# Patient Record
Sex: MALE | Race: WHITE | NOT HISPANIC OR LATINO
[De-identification: names, ages, dates, MRNs, and addresses within clinical notes are randomized per-mention and may not be internally consistent; named-entity substitution may affect disease eponyms.]

---

## 2018-07-04 ENCOUNTER — MEDICATION RENEWAL (OUTPATIENT)
Age: 83
End: 2018-07-04

## 2018-07-04 DIAGNOSIS — I10 ESSENTIAL (PRIMARY) HYPERTENSION: ICD-10-CM

## 2018-09-04 ENCOUNTER — RX RENEWAL (OUTPATIENT)
Age: 83
End: 2018-09-04

## 2018-10-05 ENCOUNTER — MEDICATION RENEWAL (OUTPATIENT)
Age: 83
End: 2018-10-05

## 2018-12-17 ENCOUNTER — RX RENEWAL (OUTPATIENT)
Age: 83
End: 2018-12-17

## 2019-02-04 ENCOUNTER — RX RENEWAL (OUTPATIENT)
Age: 84
End: 2019-02-04

## 2019-04-05 ENCOUNTER — RX RENEWAL (OUTPATIENT)
Age: 84
End: 2019-04-05

## 2019-05-22 ENCOUNTER — INPATIENT (INPATIENT)
Facility: HOSPITAL | Age: 84
LOS: 1 days | Discharge: HOME | End: 2019-05-24
Attending: HOSPITALIST | Admitting: HOSPITALIST
Payer: MEDICARE

## 2019-05-22 VITALS
SYSTOLIC BLOOD PRESSURE: 197 MMHG | OXYGEN SATURATION: 97 % | HEART RATE: 104 BPM | TEMPERATURE: 98 F | DIASTOLIC BLOOD PRESSURE: 103 MMHG | RESPIRATION RATE: 16 BRPM

## 2019-05-22 LAB
ALBUMIN SERPL ELPH-MCNC: 3.9 G/DL — SIGNIFICANT CHANGE UP (ref 3.3–5)
ALP SERPL-CCNC: 55 U/L — SIGNIFICANT CHANGE UP (ref 40–120)
ALT FLD-CCNC: 19 U/L — SIGNIFICANT CHANGE UP (ref 4–41)
ANION GAP SERPL CALC-SCNC: 14 MMO/L — SIGNIFICANT CHANGE UP (ref 7–14)
APTT BLD: 25.5 SEC — LOW (ref 27.5–36.3)
AST SERPL-CCNC: 47 U/L — HIGH (ref 4–40)
BASOPHILS # BLD AUTO: 0.06 K/UL — SIGNIFICANT CHANGE UP (ref 0–0.2)
BASOPHILS NFR BLD AUTO: 0.5 % — SIGNIFICANT CHANGE UP (ref 0–2)
BILIRUB SERPL-MCNC: 0.6 MG/DL — SIGNIFICANT CHANGE UP (ref 0.2–1.2)
BUN SERPL-MCNC: 17 MG/DL — SIGNIFICANT CHANGE UP (ref 7–23)
CALCIUM SERPL-MCNC: 9.4 MG/DL — SIGNIFICANT CHANGE UP (ref 8.4–10.5)
CHLORIDE SERPL-SCNC: 102 MMOL/L — SIGNIFICANT CHANGE UP (ref 98–107)
CO2 SERPL-SCNC: 23 MMOL/L — SIGNIFICANT CHANGE UP (ref 22–31)
CREAT SERPL-MCNC: 1.06 MG/DL — SIGNIFICANT CHANGE UP (ref 0.5–1.3)
EOSINOPHIL # BLD AUTO: 0.02 K/UL — SIGNIFICANT CHANGE UP (ref 0–0.5)
EOSINOPHIL NFR BLD AUTO: 0.2 % — SIGNIFICANT CHANGE UP (ref 0–6)
GLUCOSE SERPL-MCNC: 121 MG/DL — HIGH (ref 70–99)
HCT VFR BLD CALC: 45.9 % — SIGNIFICANT CHANGE UP (ref 39–50)
HGB BLD-MCNC: 14.4 G/DL — SIGNIFICANT CHANGE UP (ref 13–17)
IMM GRANULOCYTES NFR BLD AUTO: 0.8 % — SIGNIFICANT CHANGE UP (ref 0–1.5)
INR BLD: 1.1 — SIGNIFICANT CHANGE UP (ref 0.88–1.17)
LYMPHOCYTES # BLD AUTO: 1.05 K/UL — SIGNIFICANT CHANGE UP (ref 1–3.3)
LYMPHOCYTES # BLD AUTO: 7.9 % — LOW (ref 13–44)
MAGNESIUM SERPL-MCNC: 1.9 MG/DL — SIGNIFICANT CHANGE UP (ref 1.6–2.6)
MCHC RBC-ENTMCNC: 27.6 PG — SIGNIFICANT CHANGE UP (ref 27–34)
MCHC RBC-ENTMCNC: 31.4 % — LOW (ref 32–36)
MCV RBC AUTO: 87.9 FL — SIGNIFICANT CHANGE UP (ref 80–100)
MONOCYTES # BLD AUTO: 1.08 K/UL — HIGH (ref 0–0.9)
MONOCYTES NFR BLD AUTO: 8.2 % — SIGNIFICANT CHANGE UP (ref 2–14)
NEUTROPHILS # BLD AUTO: 10.9 K/UL — HIGH (ref 1.8–7.4)
NEUTROPHILS NFR BLD AUTO: 82.4 % — HIGH (ref 43–77)
NRBC # FLD: 0 K/UL — SIGNIFICANT CHANGE UP (ref 0–0)
PHOSPHATE SERPL-MCNC: 2.8 MG/DL — SIGNIFICANT CHANGE UP (ref 2.5–4.5)
PLATELET # BLD AUTO: 208 K/UL — SIGNIFICANT CHANGE UP (ref 150–400)
PMV BLD: 10.6 FL — SIGNIFICANT CHANGE UP (ref 7–13)
POTASSIUM SERPL-MCNC: 5.9 MMOL/L — HIGH (ref 3.5–5.3)
POTASSIUM SERPL-SCNC: 5.9 MMOL/L — HIGH (ref 3.5–5.3)
PROT SERPL-MCNC: 7.2 G/DL — SIGNIFICANT CHANGE UP (ref 6–8.3)
PROTHROM AB SERPL-ACNC: 12.3 SEC — SIGNIFICANT CHANGE UP (ref 9.8–13.1)
RBC # BLD: 5.22 M/UL — SIGNIFICANT CHANGE UP (ref 4.2–5.8)
RBC # FLD: 13.9 % — SIGNIFICANT CHANGE UP (ref 10.3–14.5)
SODIUM SERPL-SCNC: 139 MMOL/L — SIGNIFICANT CHANGE UP (ref 135–145)
TROPONIN T, HIGH SENSITIVITY: 27 NG/L — SIGNIFICANT CHANGE UP (ref ?–14)
TSH SERPL-MCNC: 12.82 UIU/ML — HIGH (ref 0.27–4.2)
WBC # BLD: 13.21 K/UL — HIGH (ref 3.8–10.5)
WBC # FLD AUTO: 13.21 K/UL — HIGH (ref 3.8–10.5)

## 2019-05-22 RX ORDER — MORPHINE SULFATE 50 MG/1
2 CAPSULE, EXTENDED RELEASE ORAL ONCE
Refills: 0 | Status: DISCONTINUED | OUTPATIENT
Start: 2019-05-22 | End: 2019-05-22

## 2019-05-22 RX ORDER — METOPROLOL TARTRATE 50 MG
5 TABLET ORAL ONCE
Refills: 0 | Status: COMPLETED | OUTPATIENT
Start: 2019-05-22 | End: 2019-05-22

## 2019-05-22 RX ORDER — ADENOSINE 3 MG/ML
6 INJECTION INTRAVENOUS ONCE
Refills: 0 | Status: COMPLETED | OUTPATIENT
Start: 2019-05-22 | End: 2019-05-22

## 2019-05-22 RX ORDER — METOPROLOL TARTRATE 50 MG
25 TABLET ORAL ONCE
Refills: 0 | Status: COMPLETED | OUTPATIENT
Start: 2019-05-22 | End: 2019-05-22

## 2019-05-22 RX ORDER — SODIUM CHLORIDE 9 MG/ML
1000 INJECTION INTRAMUSCULAR; INTRAVENOUS; SUBCUTANEOUS ONCE
Refills: 0 | Status: COMPLETED | OUTPATIENT
Start: 2019-05-22 | End: 2019-05-22

## 2019-05-22 RX ADMIN — SODIUM CHLORIDE 1000 MILLILITER(S): 9 INJECTION INTRAMUSCULAR; INTRAVENOUS; SUBCUTANEOUS at 23:29

## 2019-05-22 RX ADMIN — ADENOSINE 6 MILLIGRAM(S): 3 INJECTION INTRAVENOUS at 23:29

## 2019-05-22 RX ADMIN — MORPHINE SULFATE 2 MILLIGRAM(S): 50 CAPSULE, EXTENDED RELEASE ORAL at 23:50

## 2019-05-22 NOTE — ED ADULT NURSE NOTE - NSIMPLEMENTINTERV_GEN_ALL_ED
Implemented All Fall with Harm Risk Interventions:  [place] to call system. Call bell, personal items and telephone within reach. Instruct patient to call for assistance. Room bathroom lighting operational. Non-slip footwear when patient is off stretcher. Physically safe environment: no spills, clutter or unnecessary equipment. Stretcher in lowest position, wheels locked, appropriate side rails in place. Provide visual cue, wrist band, yellow gown, etc. Monitor gait and stability. Monitor for mental status changes and reorient to person, place, and time. Review medications for side effects contributing to fall risk. Reinforce activity limits and safety measures with patient and family. Provide visual clues: red socks.

## 2019-05-22 NOTE — ED PROVIDER NOTE - CLINICAL SUMMARY MEDICAL DECISION MAKING FREE TEXT BOX
Fall in patient with tachyarrhythmia.  Possibly related to medication noncompliance vs. dehydration vs. electrolyte derangement vs. pain.  Labs, CT head/c-spine/chest, admit to tele.

## 2019-05-22 NOTE — ED PROVIDER NOTE - OBJECTIVE STATEMENT
89 y.o. male PMH HTN p/w fall.  He was working in his garden, and upon coming out of his door he tripped and fell down his outside steps and hit his head.  He is endorsing headache and left anterior rib pain.  NO neck pain, back pain, loss of consciousness.  No blood thinners.  He lives alone.  He used to take atenolol, but stopped taking all of his meds two months ago because he was sick of taking pills. 89 y.o. male PMH HTN p/w fall.  He was working in his garden, and upon coming out of his door he tripped and fell down his outside steps and hit his head.  He is endorsing headache and left anterior rib pain.  NO neck pain, back pain, loss of consciousness.  No blood thinners.  He lives alone.  He used to take atenolol, but stopped taking all of his meds two months ago because he was sick of taking pills.  Attending - Agree with above.  I evaluated patient myself.  89 y/o M with daughter and granddaughter at bedside.  To ED after accidental fall down approx 7 steps.  + head trauma to left parietooccip area per patient.  No LOC.  Denies blurry vision, headache, n/v.  In ED developed SVT which was treated and subsequently afib with RVR.

## 2019-05-22 NOTE — ED PROVIDER NOTE - PROGRESS NOTE DETAILS
Called to bedside by nurse for .  EKG appeared regular, narrow complex, c/w SVT.  Vagal maneuvers attempted unsuccessfully.  Mentating appropriately, BP stable.  Adenosine 6mg pushed with transformation to Aflutter with variable conduction 110s, then to sinus around 100. HR increased again to 150s.  Metoprolol 5mg IV push, HR dropped within 1 minute to 90's.  Metoprolol 25mg oral ordered. Bill YOUNG: Pt signed out to me.  CT head and c-spine with no acute abnormalities.  CT chest showing multiple anterior rib fractures, no flail segment, no underlying ptx/hemothorax.  I spoke with trauma attending at [facility] - as patient is pain controlled and has no other underlying traumatic injuries, no need for transfer for trauma evaluation or admission.  Upon reassessment pt is without any complaints, no chest pain or SOB at this time.  But given episode of tachycardia earlier in this evening, will admit for tele monitoring.  Trop stable x 2.

## 2019-05-22 NOTE — ED ADULT NURSE REASSESSMENT NOTE - NS ED NURSE REASSESS COMMENT FT1
Pt remains stable at this time. Denies any complaints. respirations are equal and unlabored, sating 98% on room air. Sinus tachycardic on monitor. Family at bedside. Awaiting further plan at this time

## 2019-05-22 NOTE — ED ADULT NURSE REASSESSMENT NOTE - NS ED NURSE REASSESS COMMENT FT1
6mg of Adenosine given by MD Hernandez. Pt HR went to 107. Pt remains stable and denies any complaints at this time. Respirations remain equal and unlabored. Sinus tachycardic on monitor. Pt stable at this time. MD Hernandez and MD Luna remain at bedside evaluating pt. Family remains at bedside. Awaiting further plan

## 2019-05-22 NOTE — ED ADULT NURSE REASSESSMENT NOTE - NS ED NURSE REASSESS COMMENT FT1
Pt noted to be slightly tachycardic, placed on monitor. Sinus tachycardic on monitor. Respirations are equal and unlabored, sating 97% on room air. Denies any complaints at this time. Denies chest pain, palpitations, sob, dizziness, headache. Pt remains stable, family at bedside. Awaiting for further plan of care

## 2019-05-22 NOTE — ED PROVIDER NOTE - CARE PLAN
Principal Discharge DX:	Fall, initial encounter  Secondary Diagnosis:	Closed fracture of multiple ribs of left side, initial encounter  Secondary Diagnosis:	Tachycardia

## 2019-05-22 NOTE — ED PROVIDER NOTE - PHYSICAL EXAMINATION
Gen:  NAD, alert and oriented x 4  HEENT:  Head atraumatic, PERRL, MMM  Heart:  Tachycardic, regular rhythm, no M/R/G,m tenderness left inferior anterior ribs  Lung:  CTA b/l, no rales or wheeze  Abd:  ND, soft, NT  Ext:  No sign of trauma  Skin:  NO abrasion or ecchymosis  Neuro:  Nonfocal Gen:  NAD, alert and oriented x 4  HEENT:  Head atraumatic, PERRL, MMM  Heart:  Tachycardic, regular rhythm, no M/R/G,m tenderness left inferior anterior ribs  Lung:  CTA b/l, no rales or wheeze  Abd:  ND, soft, NT  Ext:  No sign of trauma  Skin:  NO abrasion or ecchymosis  Neuro:  Nonfocal, normal gait Gen:  NAD, alert and oriented x 4  HEENT:  Head atraumatic, PERRL, MMM  Heart:  Tachycardic, regular rhythm, no M/R/G,m tenderness left inferior anterior ribs  Lung:  CTA b/l, no rales or wheeze  Abd:  ND, soft, NT  Ext:  No sign of trauma  Skin:  NO abrasion or ecchymosis  Neuro:  Nonfocal, normal gait  ATTENDING PHYSICAL EXAM DR. Luna ***GEN - NAD; well appearing; A+O x3 ***HEAD - No cranial step-off or hematoma appreciated ***EYES/NOSE - PERRL, EOMI, mucous membranes moist, no discharge ***THROAT: Oral cavity and pharynx normal. No inflammation, swelling, exudate, or lesions.  ***NECK: Neck supple, non-tender without lymphadenopathy, no masses, no JVD.   ***PULMONARY - CTA b/l, symmetric breath sounds. ***CARDIAC -s1s2, RRR, no M,R,G  ***ABDOMEN - +BS, ND, NT, soft, no guarding, no rebound, no masses   ***BACK - no CVA tenderness, Normal  spine ***EXTREMITIES - symmetric pulses, 2+ dp, capillary refill < 2 seconds, no clubbing, no cyanosis, no edema ***SKIN - no rash or bruising   ***NEUROLOGIC - alert, CN 2-12 intact, reflexes nl, sensation nl, coordination nl, finger to nose nl, romberg negative, motor 5/5 RUE/LUE/RLE/LLE/EHL/Plantar flexion, no pronator drift

## 2019-05-22 NOTE — ED ADULT NURSE REASSESSMENT NOTE - NS ED NURSE REASSESS COMMENT FT1
Pt noted to go into SVT as per Tele tech. MD Gonzalez and MD Luna called to bedside evaluating pt. Pt denies any complaints. Respirations are equal and unlabored, no distress noted. Sating well on room air. denies chest pain, palpitations, sob, dizziness. Pt remains in SVT. 20 gauge placed in the left hand. Labs sent. EKG in progress. IV fluids infusing. Pt noted to go into SVT as per Tele tech. MD Gonzalez and MD Luna called to bedside evaluating pt. Pt denies any complaints. Respirations are equal and unlabored, no distress noted. Sating well on room air. denies chest pain, palpitations, sob, dizziness. Pt remains in SVT. HR in the 160's. 20 gauge placed in the left hand. Labs sent. EKG in progress. IV fluids infusing.

## 2019-05-22 NOTE — ED ADULT NURSE NOTE - OBJECTIVE STATEMENT
Received pt in room 23. Pt A&Ox3, ambulatory, family at bedside. Pt s/p trip and fall at home. As per pt " I tripped and fell down the stairs, and hit my head". "I have a lump on the back of my head." Pt reports no LOC. Pt now c/o left sided rib pain and pain to the back of the head. Hematoma noted to the top of the head, no laceration or bleeding noted. No deformity, bleeding, bruising, swelling, redness noted to left ride area. No injuries noted to the rest of the body. Skin intact. Pt appears stable and in no apparent distress. hx. HTN. pt noted to be hypertensive, but otherwise vitally stable. Airway patent, pt speaking full sentences. respirations equal, nonlabored, no sign of respiratory distress. sating 97% on room air. +ROM in all extremities. Denies chest pain, palpitations, sob, n/v/d, dizziness, blurry vision, headache, fever, urinary complications. Denies use of blood thinners. Pt stable, safety maintained, family remains at bedside. awaiting further plan at this time

## 2019-05-22 NOTE — ED ADULT TRIAGE NOTE - CHIEF COMPLAINT QUOTE
Pt calm pleasant affect Pt st" I stepped into the house and I think I tripped on a torn rubber door jam and fell down the stairs ....I have a lump on head....did not pass out I got up slowly and got back upstairs....I have pain in left side of rib area  and on back of head." Denied blood thinners, denies feeling short of breath,  resp even & unlabored. Localizing Hematoma to posterior head.  hx htn

## 2019-05-23 DIAGNOSIS — Z29.9 ENCOUNTER FOR PROPHYLACTIC MEASURES, UNSPECIFIED: ICD-10-CM
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
## 2019-05-23 DIAGNOSIS — S22.42XA MULTIPLE FRACTURES OF RIBS, LEFT SIDE, INITIAL ENCOUNTER FOR CLOSED FRACTURE: ICD-10-CM

## 2019-05-23 DIAGNOSIS — I47.1 SUPRAVENTRICULAR TACHYCARDIA: ICD-10-CM

## 2019-05-23 DIAGNOSIS — I10 ESSENTIAL (PRIMARY) HYPERTENSION: ICD-10-CM

## 2019-05-23 DIAGNOSIS — E03.9 HYPOTHYROIDISM, UNSPECIFIED: ICD-10-CM

## 2019-05-23 LAB
ANION GAP SERPL CALC-SCNC: 13 MMO/L — SIGNIFICANT CHANGE UP (ref 7–14)
BLD GP AB SCN SERPL QL: NEGATIVE — SIGNIFICANT CHANGE UP
BUN SERPL-MCNC: 15 MG/DL — SIGNIFICANT CHANGE UP (ref 7–23)
CALCIUM SERPL-MCNC: 9.4 MG/DL — SIGNIFICANT CHANGE UP (ref 8.4–10.5)
CHLORIDE SERPL-SCNC: 103 MMOL/L — SIGNIFICANT CHANGE UP (ref 98–107)
CHOLEST SERPL-MCNC: 159 MG/DL — SIGNIFICANT CHANGE UP (ref 120–199)
CK MB BLD-MCNC: 4.2 — HIGH (ref 0–2.5)
CK MB BLD-MCNC: 7.78 NG/ML — HIGH (ref 1–6.6)
CK SERPL-CCNC: 187 U/L — SIGNIFICANT CHANGE UP (ref 30–200)
CO2 SERPL-SCNC: 28 MMOL/L — SIGNIFICANT CHANGE UP (ref 22–31)
CREAT SERPL-MCNC: 0.92 MG/DL — SIGNIFICANT CHANGE UP (ref 0.5–1.3)
GLUCOSE SERPL-MCNC: 109 MG/DL — HIGH (ref 70–99)
HBA1C BLD-MCNC: 6.2 % — HIGH (ref 4–5.6)
HCT VFR BLD CALC: 42.3 % — SIGNIFICANT CHANGE UP (ref 39–50)
HDLC SERPL-MCNC: 52 MG/DL — SIGNIFICANT CHANGE UP (ref 35–55)
HGB BLD-MCNC: 13.9 G/DL — SIGNIFICANT CHANGE UP (ref 13–17)
LIPID PNL WITH DIRECT LDL SERPL: 108 MG/DL — SIGNIFICANT CHANGE UP
MAGNESIUM SERPL-MCNC: 1.8 MG/DL — SIGNIFICANT CHANGE UP (ref 1.6–2.6)
MCHC RBC-ENTMCNC: 28.5 PG — SIGNIFICANT CHANGE UP (ref 27–34)
MCHC RBC-ENTMCNC: 32.9 % — SIGNIFICANT CHANGE UP (ref 32–36)
MCV RBC AUTO: 86.7 FL — SIGNIFICANT CHANGE UP (ref 80–100)
NRBC # FLD: 0 K/UL — SIGNIFICANT CHANGE UP (ref 0–0)
PLATELET # BLD AUTO: 205 K/UL — SIGNIFICANT CHANGE UP (ref 150–400)
PMV BLD: 10.6 FL — SIGNIFICANT CHANGE UP (ref 7–13)
POTASSIUM SERPL-MCNC: 3.8 MMOL/L — SIGNIFICANT CHANGE UP (ref 3.5–5.3)
POTASSIUM SERPL-SCNC: 3.8 MMOL/L — SIGNIFICANT CHANGE UP (ref 3.5–5.3)
RBC # BLD: 4.88 M/UL — SIGNIFICANT CHANGE UP (ref 4.2–5.8)
RBC # FLD: 14 % — SIGNIFICANT CHANGE UP (ref 10.3–14.5)
RH IG SCN BLD-IMP: POSITIVE — SIGNIFICANT CHANGE UP
SODIUM SERPL-SCNC: 144 MMOL/L — SIGNIFICANT CHANGE UP (ref 135–145)
TRIGL SERPL-MCNC: 70 MG/DL — SIGNIFICANT CHANGE UP (ref 10–149)
TROPONIN T, HIGH SENSITIVITY: 33 NG/L — SIGNIFICANT CHANGE UP (ref ?–14)
TROPONIN T, HIGH SENSITIVITY: 34 NG/L — SIGNIFICANT CHANGE UP (ref ?–14)
TSH SERPL-MCNC: 10.63 UIU/ML — HIGH (ref 0.27–4.2)
WBC # BLD: 8.38 K/UL — SIGNIFICANT CHANGE UP (ref 3.8–10.5)
WBC # FLD AUTO: 8.38 K/UL — SIGNIFICANT CHANGE UP (ref 3.8–10.5)

## 2019-05-23 PROCEDURE — 71250 CT THORAX DX C-: CPT | Mod: 26

## 2019-05-23 PROCEDURE — 99223 1ST HOSP IP/OBS HIGH 75: CPT

## 2019-05-23 PROCEDURE — 72125 CT NECK SPINE W/O DYE: CPT | Mod: 26

## 2019-05-23 PROCEDURE — 70450 CT HEAD/BRAIN W/O DYE: CPT | Mod: 26

## 2019-05-23 RX ORDER — ENOXAPARIN SODIUM 100 MG/ML
40 INJECTION SUBCUTANEOUS EVERY 24 HOURS
Refills: 0 | Status: DISCONTINUED | OUTPATIENT
Start: 2019-05-23 | End: 2019-05-24

## 2019-05-23 RX ORDER — IBUPROFEN 200 MG
600 TABLET ORAL EVERY 6 HOURS
Refills: 0 | Status: DISCONTINUED | OUTPATIENT
Start: 2019-05-23 | End: 2019-05-24

## 2019-05-23 RX ORDER — LEVOTHYROXINE SODIUM 125 MCG
75 TABLET ORAL DAILY
Refills: 0 | Status: DISCONTINUED | OUTPATIENT
Start: 2019-05-23 | End: 2019-05-24

## 2019-05-23 RX ORDER — METOPROLOL TARTRATE 50 MG
25 TABLET ORAL
Refills: 0 | Status: DISCONTINUED | OUTPATIENT
Start: 2019-05-23 | End: 2019-05-24

## 2019-05-23 RX ORDER — PANTOPRAZOLE SODIUM 20 MG/1
40 TABLET, DELAYED RELEASE ORAL
Refills: 0 | Status: DISCONTINUED | OUTPATIENT
Start: 2019-05-23 | End: 2019-05-24

## 2019-05-23 RX ADMIN — Medication 25 MILLIGRAM(S): at 00:01

## 2019-05-23 RX ADMIN — Medication 75 MICROGRAM(S): at 12:28

## 2019-05-23 RX ADMIN — Medication 25 MILLIGRAM(S): at 17:43

## 2019-05-23 RX ADMIN — ENOXAPARIN SODIUM 40 MILLIGRAM(S): 100 INJECTION SUBCUTANEOUS at 09:38

## 2019-05-23 RX ADMIN — PANTOPRAZOLE SODIUM 40 MILLIGRAM(S): 20 TABLET, DELAYED RELEASE ORAL at 09:38

## 2019-05-23 RX ADMIN — Medication 25 MILLIGRAM(S): at 09:07

## 2019-05-23 RX ADMIN — Medication 5 MILLIGRAM(S): at 00:01

## 2019-05-23 NOTE — H&P ADULT - HISTORY OF PRESENT ILLNESS
89 male pmh of HTN, Hypothyroid, skin ca (unknown what type) s/p chemo (10-15 years ago) presents after a fall. Patient states that on 5/22/19 around dinner time he was going to go down his stairs  when he tripped and fell down the stairs, hitting his left side and his head. Prior to fall denies any complaints, he was in usual state of health and feeling well. When he hit the ground he started to have left side chest pain, 9/10, felt like he was punched in the chest, and made it difficult to take a deep breath and reproduced by lifting his left arm.  He was on the ground for a couple of minutes, when he got himself up he had a glass of wine to try to decrease the pain. He then came to the ED to be evaluted. While in the ER he was noted to have a heart rate of 160, he received adenosine and metoprolol in hte IV. He has had a fast heart rate in the past but only when nervous, was never told he has AFib or Aflutter. Of note he stopped taking all of his medications in the last 6 months becuase he did not want to feel like a "pill popper", and he felt fine without them. He denies any LOC, dizziness, abd pain, sob, diarrhea, brbpr, melena, fever, chills, nausea, vomiting, dysuria, hematuria, visual change, palpitations, seizure activity, urinary incontinence.

## 2019-05-23 NOTE — H&P ADULT - NEGATIVE NEUROLOGICAL SYMPTOMS
no headache/no difficulty walking/no weakness/no confusion/no generalized seizures/no focal seizures/no loss of consciousness/no paresthesias/no syncope/no vertigo/no transient paralysis

## 2019-05-23 NOTE — H&P ADULT - NSICDXPASTMEDICALHX_GEN_ALL_CORE_FT
PAST MEDICAL HISTORY:  HTN (hypertension)     Hypothyroidism     Skin cancer unknown type s/p chemo 10-15 years ago, s/p resection    Urinary retention

## 2019-05-23 NOTE — H&P ADULT - ATTENDING COMMENTS
Patient was seen and examined personally by me. I have discussed the plan and reviewed the PA's note and agree with the above physical exam findings including assessment and plan except as indicated below. Lab and imagining reviewed.     89M with HTN hypothyroid non-compliant with home meds, poor historian, presented after unwitnessed fall down multiple steps of stairs with left sided chest pain. found to have multiple left rib fx. respiratory stable, no paroxysmal chest wall motions on exam. NSAID for pain control, start PPI as patient have h/o gastritis and ulcer. incenstive spirometer. PT eval for dispo.   Also in SVT on presentation s/p adenosine and BB with control of HR. Possible Afib/aflutter per ED after rate control, but strip reviewed and more like frequent APC. trops with neg delta. CK can be from trauma. need to clarify old home meds and resume home BB and synthroid.  TTE pending.

## 2019-05-23 NOTE — H&P ADULT - PROBLEM SELECTOR PLAN 1
On presentation to hospital noted to have heart rate in 160s  - received Adenosine 0.6mg x1 as well as metoprolol 5mg IV x1 - Per ED report broke from SVT to Aflutter with variable block and then into Sinus rhythm.   Will request EP eval to review strips  as might be Sinus tach with APC's and not Aflutter/Afib.   Will get echocardiogram  Lopressor 25mg BID ordered

## 2019-05-23 NOTE — H&P ADULT - PROBLEM SELECTOR PLAN 3
Left anterolateral closed fx of ribs 6-8 c possible left anterolateral non-displaced fractures ribs 3-5  NO evidence of PTX on CT Chest  Currently patient is pain free but does have difficulty taking a deep breath  Pain management.

## 2019-05-23 NOTE — H&P ADULT - ASSESSMENT
89 male pmh of HTN, Hypothyroid, skin ca (unknown what type) s/p chemo (10-15 years ago) presents after a mechanical fall s/b left rib fracture ribs 3-8 noted to be have SVT to 160 aflutter vs Sinus tach admitted for further evaluation.

## 2019-05-23 NOTE — H&P ADULT - PROBLEM SELECTOR PLAN 6
IMPROVE VTE Individual Risk Assessment  RISK                                                                Points  [  ] Previous VTE                                                  3  [  ] Thrombophilia                                               2  [  ] Lower limb paralysis                                      2  [  ] Current Cancer                                              2   { x ] Immobilization > 24 hrs                                1    [  ] ICU/CCU stay > 24 hours                              1  [ x ] Age > 60                                                      1    IMPROVE VTE Score ___2______

## 2019-05-23 NOTE — ED ADULT NURSE REASSESSMENT NOTE - NS ED NURSE REASSESS COMMENT FT1
Pt remains stable and comfortable. denies any complaints. sinus rhythm on monitor. respirations are equal and unlabored. Vitals stable. Call bell within reach.

## 2019-05-23 NOTE — CONSULT NOTE ADULT - ASSESSMENT
Patient is a 89y old male with past medical history of HTN, hypothyroidism, skin cancer s/p chemo 15 years ago who was cutting the grass, became mildly short of breath, walked into the house to rest and fell onto the floor. The fall was unwitnessed. 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  He has remained in NSR . He had stopped taking all medications including Atenolol x 2 weeks. No prior syncope history.  After discussing the treatment options and taking into account the traumatic rib fractures that he sustained, we recommend continuing the beta blocker and  inserting a loop recorder to monitor for additional arrhythmic events. The risks, benefits and alternatives tot he procedure were discussed and they have agreed and he is scheduled for tomorrow.

## 2019-05-23 NOTE — H&P ADULT - NSHPSOCIALHISTORY_GEN_ALL_CORE
Lives alone, , retired  worker  previous smoker, 6 pack year history, quit 20 years ago  minimal etoh use, no illicit drug use

## 2019-05-23 NOTE — CONSULT NOTE ADULT - SUBJECTIVE AND OBJECTIVE BOX
Patient is a 89y old male with past medical history of HTN, hypothyroidism, skin cancer s/p chemo 15 years ago who was cutting the grass, became mildly short of breath, walked into the house to rest and fell onto the floor.  He did not lose consciousness but doesn't know what made him fall.  Denies associated dizziness, palpitations, chest pain or lightheadedness. When he fell to the ground he hit his head and the left side of his body but was able to get himself up after a few minutes and pour himself a glass of wine to ease the pain. He came to the ED to be evaluated and was found to be in  bpm.  He received Adenosine 6mg x1 and metoprolol 5mg IV x1 and converted to normal sinus rhythm.  He has remained in NSR.  Of note, patient had stopped taking all medications 2 weeks ago, including Atenolol.  He is now on metoprolol 25 mg bid with no further episodes of SVT. His only complaint is left sided pain 2/2 the rib fx's sustained from the fall.   No prior cardiac history.        PAST MEDICAL & SURGICAL HISTORY:  Skin cancer: unknown type s/p chemo 10-15 years ago, s/p resection  Urinary retention  HTN (hypertension)  Hypothyroidism  No significant past surgical history        MEDICATIONS  (STANDING):  enoxaparin Injectable 40 milliGRAM(s) SubCutaneous every 24 hours  levothyroxine 75 MICROGram(s) Oral daily  metoprolol tartrate 25 milliGRAM(s) Oral two times a day  pantoprazole    Tablet 40 milliGRAM(s) Oral before breakfast    MEDICATIONS  (PRN):  ibuprofen  Tablet. 600 milliGRAM(s) Oral every 6 hours PRN Mild Pain (1 - 3), Moderate Pain (4 - 6), Severe Pain (7 - 10)      FAMILY HISTORY:      SOCIAL HISTORY:   Lives alone    CIGARETTES:  never  Drugs:  no  ALCOHOL:  occasional wine    REVIEW OF SYSTEMS:    CONSTITUTIONAL: No fever, weight loss, chills, shakes, or fatigue  EYES: No eye pain, visual disturbances, or discharge  ENMT:  Mild hearing loss  No tinnitus  No sinus or throat pain  Long standing hx of vertigo  NECK: No pain or stiffness  BREASTS: No pain, masses, or nipple discharge  RESPIRATORY: No cough, wheezing, hemoptysis, or shortness of breath  CARDIOVASCULAR: No chest pain, dyspnea, palpitations, dizziness, syncope, paroxysmal nocturnal dyspnea, orthopnea, or arm or leg swelling  GASTROINTESTINAL: No abdominal  or epigastric pain, nausea, vomiting, hematemesis, diarrhea, constipation, melena or bright red blood.  GENITOURINARY: No dysuria, nocturia, hematuria, or urinary incontinence  NEUROLOGICAL: No headaches, memory loss, slurred speech, limb weakness, loss of strength, numbness, or tremors  SKIN: No itching, burning, rashes, or lesions     LYMPH NODES: No enlarged glands  ENDOCRINE: No heat or cold intolerance, or hair loss  MUSCULOSKELETAL: No joint pain or swelling, muscle, back, or extremity pain  PSYCHIATRIC: No depression, anxiety, or difficulty sleeping  HEME/LYMPH: No easy bruising or bleeding gums  ALLERGY AND IMMUNOLOGIC: No hives or rash.      Vital Signs Last 24 Hrs  T(C): 36.9 (23 May 2019 20:33), Max: 37.3 (22 May 2019 23:29)  T(F): 98.4 (23 May 2019 20:33), Max: 99.1 (22 May 2019 23:29)  HR: 64 (23 May 2019 20:33) (64 - 160)  BP: 150/87 (23 May 2019 20:33) (149/75 - 210/125)  BP(mean): --  RR: 17 (23 May 2019 20:33) (15 - 30)  SpO2: 97% (23 May 2019 20:33) (95% - 100%)    PHYSICAL EXAM:    GENERAL: In no apparent distress, well nourished, and hydrated.   HEAD:  Atraumatic, Normocephalic  Small lump on the back of his head  EYES: EOMI, PERRLA, conjunctiva and sclera clear  ENMT: No tonsillar erythema, exudates, or enlargement; Moist mucous membranes,   NECK: Supple and normal thyroid.  No JVD or carotid bruit.  Carotid pulse is 2+ bilaterally.  HEART: Regular rate and rhythm; No murmurs, rubs, or gallops.  PULMONARY: Clear to auscultation and perfusion.  No rales, wheezing, or rhonchi bilaterally.  ABDOMEN: Soft, Nontender, Nondistended; Bowel sounds present  EXTREMITIES:  2+ Peripheral Pulses, No clubbing, cyanosis, or edema.  Very dry skin both lower legs.  LYMPH: No lymphadenopathy noted  NEUROLOGICAL: Grossly nonfocal          INTERPRETATION OF TELEMETRY:  NSR 74 bpm.  NSVT x 3 beats    ECG:  Short R-P tachycardia 160 bpm    I&O's Detail      LABS:                        13.9   8.38  )-----------( 205      ( 23 May 2019 07:45 )             42.3     05-23    144  |  103  |  15  ----------------------------<  109<H>  3.8   |  28  |  0.92    Ca    9.4      23 May 2019 07:45  Phos  2.8     05-22  Mg     1.8     05-23    TPro  7.2  /  Alb  3.9  /  TBili  0.6  /  DBili  x   /  AST  47<H>  /  ALT  19  /  AlkPhos  55  05-22    CARDIAC MARKERS ( 23 May 2019 07:45 )  x     / x     / 187 u/L / 7.78 ng/mL / x          PT/INR - ( 22 May 2019 23:02 )   PT: 12.3 SEC;   INR: 1.10          PTT - ( 22 May 2019 23:02 )  PTT:25.5 SEC    BNP  I&O's Detail    Daily Height in cm: 162.56 (23 May 2019 07:51)    Daily     RADIOLOGY & ADDITIONAL STUDIES:  PREVIOUS DIAGNOSTIC TESTING:      ECHO  FINDINGS:  pending

## 2019-05-23 NOTE — H&P ADULT - RS GEN PE MLT RESP DETAILS PC
no wheezes/no rales/respirations non-labored/breath sounds equal/good air movement/airway patent/no rhonchi

## 2019-05-23 NOTE — ED ADULT NURSE REASSESSMENT NOTE - NS ED NURSE REASSESS COMMENT FT1
Pt sleeping, pt appears comfortable and stable at this time. Denies any complaints. Sinus rhythm on monitor. Respirations equal nonlabored, no sign of respiratory distress. sating well. Pt stable, awaiting further plan at this time

## 2019-05-23 NOTE — H&P ADULT - NSHPLABSRESULTS_GEN_ALL_CORE
EKG #1 SVT  @ 160, LAD and IRBBB  EKG #2 Afib/Flutter vs Sinus Tach c APC's @ 110  EKG #3 Sinus tach @ 109 c LAD and IRBBB  Trop 27 > 33  CT Chest acute fx of left anterolateral ribs 6-8, with possible non-displaced facture 3-5  CT Head: no acute hemorrhage or stroke  CT C-Spine No acute fx  WBC 13.21  K5.9  Hemolyzed

## 2019-05-24 ENCOUNTER — TRANSCRIPTION ENCOUNTER (OUTPATIENT)
Age: 84
End: 2019-05-24

## 2019-05-24 VITALS
SYSTOLIC BLOOD PRESSURE: 168 MMHG | TEMPERATURE: 98 F | RESPIRATION RATE: 19 BRPM | DIASTOLIC BLOOD PRESSURE: 92 MMHG | OXYGEN SATURATION: 96 % | HEART RATE: 79 BPM

## 2019-05-24 PROBLEM — C44.90 UNSPECIFIED MALIGNANT NEOPLASM OF SKIN, UNSPECIFIED: Chronic | Status: ACTIVE | Noted: 2019-05-23

## 2019-05-24 LAB
ANION GAP SERPL CALC-SCNC: 12 MMO/L — SIGNIFICANT CHANGE UP (ref 7–14)
BUN SERPL-MCNC: 18 MG/DL — SIGNIFICANT CHANGE UP (ref 7–23)
CALCIUM SERPL-MCNC: 9.4 MG/DL — SIGNIFICANT CHANGE UP (ref 8.4–10.5)
CHLORIDE SERPL-SCNC: 103 MMOL/L — SIGNIFICANT CHANGE UP (ref 98–107)
CO2 SERPL-SCNC: 26 MMOL/L — SIGNIFICANT CHANGE UP (ref 22–31)
CREAT SERPL-MCNC: 0.97 MG/DL — SIGNIFICANT CHANGE UP (ref 0.5–1.3)
GLUCOSE SERPL-MCNC: 94 MG/DL — SIGNIFICANT CHANGE UP (ref 70–99)
HCT VFR BLD CALC: 43.6 % — SIGNIFICANT CHANGE UP (ref 39–50)
HGB BLD-MCNC: 13.7 G/DL — SIGNIFICANT CHANGE UP (ref 13–17)
MAGNESIUM SERPL-MCNC: 2 MG/DL — SIGNIFICANT CHANGE UP (ref 1.6–2.6)
MCHC RBC-ENTMCNC: 27.8 PG — SIGNIFICANT CHANGE UP (ref 27–34)
MCHC RBC-ENTMCNC: 31.4 % — LOW (ref 32–36)
MCV RBC AUTO: 88.4 FL — SIGNIFICANT CHANGE UP (ref 80–100)
NRBC # FLD: 0 K/UL — SIGNIFICANT CHANGE UP (ref 0–0)
PLATELET # BLD AUTO: 198 K/UL — SIGNIFICANT CHANGE UP (ref 150–400)
PMV BLD: 11 FL — SIGNIFICANT CHANGE UP (ref 7–13)
POTASSIUM SERPL-MCNC: 3.7 MMOL/L — SIGNIFICANT CHANGE UP (ref 3.5–5.3)
POTASSIUM SERPL-SCNC: 3.7 MMOL/L — SIGNIFICANT CHANGE UP (ref 3.5–5.3)
RBC # BLD: 4.93 M/UL — SIGNIFICANT CHANGE UP (ref 4.2–5.8)
RBC # FLD: 14 % — SIGNIFICANT CHANGE UP (ref 10.3–14.5)
SODIUM SERPL-SCNC: 141 MMOL/L — SIGNIFICANT CHANGE UP (ref 135–145)
T4 FREE SERPL-MCNC: 0.91 NG/DL — SIGNIFICANT CHANGE UP (ref 0.9–1.8)
WBC # BLD: 9.36 K/UL — SIGNIFICANT CHANGE UP (ref 3.8–10.5)
WBC # FLD AUTO: 9.36 K/UL — SIGNIFICANT CHANGE UP (ref 3.8–10.5)

## 2019-05-24 PROCEDURE — 99232 SBSQ HOSP IP/OBS MODERATE 35: CPT

## 2019-05-24 PROCEDURE — 33285 INSJ SUBQ CAR RHYTHM MNTR: CPT

## 2019-05-24 PROCEDURE — 99239 HOSP IP/OBS DSCHRG MGMT >30: CPT

## 2019-05-24 PROCEDURE — 93306 TTE W/DOPPLER COMPLETE: CPT | Mod: 26

## 2019-05-24 RX ORDER — IBUPROFEN 200 MG
1 TABLET ORAL
Qty: 0 | Refills: 0 | DISCHARGE
Start: 2019-05-24

## 2019-05-24 RX ORDER — METOPROLOL TARTRATE 50 MG
1 TABLET ORAL
Qty: 60 | Refills: 0
Start: 2019-05-24 | End: 2019-06-22

## 2019-05-24 RX ORDER — PANTOPRAZOLE SODIUM 20 MG/1
1 TABLET, DELAYED RELEASE ORAL
Qty: 30 | Refills: 0
Start: 2019-05-24 | End: 2019-06-22

## 2019-05-24 RX ORDER — ATENOLOL 25 MG/1
1 TABLET ORAL
Qty: 0 | Refills: 0 | DISCHARGE

## 2019-05-24 RX ADMIN — Medication 75 MICROGRAM(S): at 05:22

## 2019-05-24 RX ADMIN — Medication 25 MILLIGRAM(S): at 05:22

## 2019-05-24 RX ADMIN — PANTOPRAZOLE SODIUM 40 MILLIGRAM(S): 20 TABLET, DELAYED RELEASE ORAL at 05:22

## 2019-05-24 NOTE — PHYSICAL THERAPY INITIAL EVALUATION ADULT - DISCHARGE DISPOSITION, PT EVAL
anticipated discharge to home with no skilled restorative physical therapy services upon discharge from [facility]. Please follow therapy for continued assessment.

## 2019-05-24 NOTE — PROGRESS NOTE ADULT - PROBLEM SELECTOR PLAN 3
Left anterolateral closed fx of ribs 6-8 c possible left anterolateral non-displaced fractures ribs 3-5  NO evidence of PTX on CT Chest  Currently patient is pain free but does have difficulty taking a deep breath  Tylenol prn

## 2019-05-24 NOTE — CHART NOTE - NSCHARTNOTEFT_GEN_A_CORE
ELECTROPHYSIOLOGY      Patient seen for ILR to be done this morning.  Procedure reviewed and consent was signed.   Labs reviewed.    Will need echo prior to discharge.

## 2019-05-24 NOTE — DISCHARGE NOTE PROVIDER - HOSPITAL COURSE
89 male pmh of HTN, Hypothyroid, skin ca (unknown what type) s/p chemo (10-15 years ago) presents after a fall. Patient states that on 5/22/19 around dinner time he was going to go down his stairs  when he tripped and fell down the stairs, hitting his left side and his head. Prior to fall denies any complaints, he was in usual state of health and feeling well. When he hit the ground he started to have left side chest pain, 9/10, felt like he was punched in the chest, and made it difficult to take a deep breath and reproduced by lifting his left arm.  He was on the ground for a couple of minutes, when he got himself up he had a glass of wine to try to decrease the pain. He then came to the ED to be evaluted. While in the ER he was noted to have a heart rate of 160, he received adenosine and metoprolol in hte IV. He has had a fast heart rate in the past but only when nervous, was never told he has AFib or Aflutter. Of note he stopped taking all of his medications in the last 6 months becuase he did not want to feel like a "pill popper", and he felt fine without them. He denies any LOC, dizziness, abd pain, sob, diarrhea, brbpr, melena, fever, chills, nausea, vomiting, dysuria, hematuria, visual change, palpitations, seizure activity, urinary incontinence.         Hospitalist attending: SVT (supraventricular tachycardia).  Plan: On presentation to hospital noted to have heart rate in 160s s/p Adenosine and metoprolol x1 - Per ED report broke from SVT to Aflutter with variable block and then into Sinus rhythm.     ILR placed by EP 5/24, TTE unremarkable. Lopressor 25mg BID. No further inpatient workup.         Case reviewed with attending who cleared for discharge. Plan discussed with patient and family who consented and agreed with plan of care.

## 2019-05-24 NOTE — PHYSICAL THERAPY INITIAL EVALUATION ADULT - PERTINENT HX OF CURRENT PROBLEM, REHAB EVAL
Pt. is a 89 year old male admitted to [facility] due to fall, left rib fracture ribs 3-8 noted to be have SVT. PMH: skin cancer, Urinary retention.

## 2019-05-24 NOTE — DISCHARGE NOTE PROVIDER - NSDCFUADDAPPT_GEN_ALL_CORE_FT
Follow up at [facility] [id number] [address] [place] [id number] Phone: [phone] on June 10th @3pm. Follow up at [facility] [id number] [address] [place] [id number] Phone: [phone] on June 10th @3pm 4th floor Oncology Building

## 2019-05-24 NOTE — DISCHARGE NOTE NURSING/CASE MANAGEMENT/SOCIAL WORK - NSDCDPATPORTLINK_GEN_ALL_CORE
You can access the [facility] Patient [facility], offered by [facility], by registering with the following website: [email or web address]

## 2019-05-24 NOTE — PROGRESS NOTE ADULT - SUBJECTIVE AND OBJECTIVE BOX
Patient is a 89y old  Male who presents with a chief complaint of Fall (23 May 2019 21:30)      SUBJECTIVE / OVERNIGHT EVENTS: No acute events overnight. Some mild tenderness over ILR placement site and left sided ribs. No SOB, N/V, dizziness.    MEDICATIONS  (STANDING):  levothyroxine 75 MICROGram(s) Oral daily  metoprolol tartrate 25 milliGRAM(s) Oral two times a day  pantoprazole    Tablet 40 milliGRAM(s) Oral before breakfast    MEDICATIONS  (PRN):  ibuprofen  Tablet. 600 milliGRAM(s) Oral every 6 hours PRN Mild Pain (1 - 3), Moderate Pain (4 - 6), Severe Pain (7 - 10)      T(C): 36.3 (05-24-19 @ 04:29), Max: 37.1 (05-24-19 @ 00:30)  HR: 69 (05-24-19 @ 04:29) (64 - 79)  BP: 143/83 (05-24-19 @ 04:29) (140/86 - 170/69)  RR: 17 (05-24-19 @ 04:29) (15 - 18)  SpO2: 97% (05-24-19 @ 04:29) (95% - 100%)  CAPILLARY BLOOD GLUCOSE  I&O's Summary    PHYSICAL EXAM:  GENERAL: no apparent distress, on room air  EYES: sclera clear b/l  CHEST/LUNG: Clear to auscultation bilaterally; No wheezing or crackles, ILR midchest with C/D/I dressing  HEART: Regular rate and rhythm; No murmurs appreciated  ABDOMEN: Soft, Nontender, Nondistended  EXTREMITIES:  WWP, trace leg edema  NEUROLOGY: awake, alert, responds to Qs appropriately, no gross deficits    LABS:                        13.7   9.36  )-----------( 198      ( 24 May 2019 05:35 )             43.6     05-24    141  |  103  |  18  ----------------------------<  94  3.7   |  26  |  0.97    Ca    9.4      24 May 2019 05:35  Phos  2.8     05-22  Mg     2.0     05-24    TPro  7.2  /  Alb  3.9  /  TBili  0.6  /  DBili  x   /  AST  47<H>  /  ALT  19  /  AlkPhos  55  05-22    PT/INR - ( 22 May 2019 23:02 )   PT: 12.3 SEC;   INR: 1.10          PTT - ( 22 May 2019 23:02 )  PTT:25.5 SEC  CARDIAC MARKERS ( 23 May 2019 07:45 )  x     / x     / 187 u/L / 7.78 ng/mL / x              RADIOLOGY & ADDITIONAL TESTS:

## 2019-05-24 NOTE — PROGRESS NOTE ADULT - PROBLEM SELECTOR PLAN 1
On presentation to hospital noted to have heart rate in 160s s/p Adenosine and metoprolol x1 - Per ED report broke from SVT to Aflutter with variable block and then into Sinus rhythm.   ILR placed by EP 5/24, TTE performed, report pending  Lopressor 25mg BID

## 2019-05-24 NOTE — PHYSICAL THERAPY INITIAL EVALUATION ADULT - ASR EQUIP NEEDS DISCH PT EVAL
rolling walker (5 inch wheels)/Pt. educated on and demonstrated how to safely utilize rolling walker with proper sequencing, body mechanics, safety awareness and fall prevention strategies/techniques.

## 2019-05-24 NOTE — CHART NOTE - NSCHARTNOTEFT_GEN_A_CORE
ELECTROPHYSIOLOGY      Patient s/p loop recorder implantation. Tolerated the procedure well.  No complications.  Device site dressing dry and intact.  Patient instructed to remove the dressing in 24 hours.   Post procedure teaching done with patient and his daughters.  Written instructions and contact information provided.  Given home monitor and instructed how to use it.   All questions answered.   He has a follow-up appointment in the device clinic on Sara 10, 2019 at 3:00pm  [address] [place] [address] [phone].  No objection to discharge.

## 2019-05-24 NOTE — DISCHARGE NOTE PROVIDER - NSDCCPCAREPLAN_GEN_ALL_CORE_FT
PRINCIPAL DISCHARGE DIAGNOSIS  Diagnosis: Fall, initial encounter  Assessment and Plan of Treatment: Admitted to the hospital following a fall. You are advised to take pain medication as needed and follow up with your primary care provider in 1-2 weeks after discharge.      SECONDARY DISCHARGE DIAGNOSES  Diagnosis: SVT (supraventricular tachycardia)  Assessment and Plan of Treatment: You were found to have an abnormally fast cardiac rhthym. A loop recorder was placed to better evaluate your irregular rhythm. You are advised to follow up outpatient with electrophysiology on June 10th @3pm.    Diagnosis: Closed fracture of multiple ribs of left side, initial encounter  Assessment and Plan of Treatment: If any signs if difficulty breathing or worsening distress you are advised to go the ED.

## 2019-05-24 NOTE — DISCHARGE NOTE PROVIDER - CARE PROVIDER_API CALL
Shea Mendoza)  Cardiac Electrophysiology; Cardiology; Internal Medicine  [address] [address] [place] [address]  Phone: [phone]  Fax: [phone]  Follow Up Time: 2 weeks

## 2019-06-10 ENCOUNTER — APPOINTMENT (OUTPATIENT)
Dept: ELECTROPHYSIOLOGY | Facility: CLINIC | Age: 84
End: 2019-06-10
Payer: MEDICARE

## 2019-06-10 VITALS — SYSTOLIC BLOOD PRESSURE: 167 MMHG | HEART RATE: 70 BPM | DIASTOLIC BLOOD PRESSURE: 83 MMHG | RESPIRATION RATE: 14 BRPM

## 2019-06-10 DIAGNOSIS — R55 SYNCOPE AND COLLAPSE: ICD-10-CM

## 2019-06-10 DIAGNOSIS — I47.1 SUPRAVENTRICULAR TACHYCARDIA: ICD-10-CM

## 2019-06-10 PROCEDURE — 93285 PRGRMG DEV EVAL SCRMS IP: CPT

## 2019-06-10 RX ORDER — TRIAMTERENE AND HYDROCHLOROTHIAZIDE 37.5; 25 MG/1; MG/1
37.5-25 CAPSULE ORAL
Qty: 30 | Refills: 5 | Status: DISCONTINUED | COMMUNITY
Start: 2018-09-04 | End: 2019-06-10

## 2019-06-10 RX ORDER — ATENOLOL 50 MG/1
50 TABLET ORAL DAILY
Qty: 90 | Refills: 3 | Status: DISCONTINUED | COMMUNITY
Start: 2018-07-04 | End: 2019-06-10

## 2019-06-10 RX ORDER — ESCITALOPRAM OXALATE 10 MG/1
10 TABLET ORAL
Qty: 30 | Refills: 1 | Status: DISCONTINUED | COMMUNITY
Start: 2019-04-05 | End: 2019-06-10

## 2019-07-15 ENCOUNTER — APPOINTMENT (OUTPATIENT)
Dept: ELECTROPHYSIOLOGY | Facility: CLINIC | Age: 84
End: 2019-07-15

## 2019-08-03 ENCOUNTER — EMERGENCY (EMERGENCY)
Facility: HOSPITAL | Age: 84
LOS: 1 days | Discharge: HOME | End: 2019-08-03
Attending: EMERGENCY MEDICINE | Admitting: EMERGENCY MEDICINE
Payer: MEDICARE

## 2019-08-03 VITALS
RESPIRATION RATE: 18 BRPM | SYSTOLIC BLOOD PRESSURE: 164 MMHG | TEMPERATURE: 98 F | HEART RATE: 85 BPM | OXYGEN SATURATION: 94 % | DIASTOLIC BLOOD PRESSURE: 79 MMHG

## 2019-08-03 VITALS
HEIGHT: 63 IN | RESPIRATION RATE: 16 BRPM | WEIGHT: 141.98 LBS | SYSTOLIC BLOOD PRESSURE: 187 MMHG | TEMPERATURE: 98 F | HEART RATE: 111 BPM | DIASTOLIC BLOOD PRESSURE: 100 MMHG | OXYGEN SATURATION: 93 %

## 2019-08-03 LAB
ALBUMIN SERPL ELPH-MCNC: 3.7 G/DL — SIGNIFICANT CHANGE UP (ref 3.3–5)
ALP SERPL-CCNC: 88 U/L — SIGNIFICANT CHANGE UP (ref 40–120)
ALT FLD-CCNC: 24 U/L — SIGNIFICANT CHANGE UP (ref 12–78)
ANION GAP SERPL CALC-SCNC: 4 MMOL/L — LOW (ref 5–17)
AST SERPL-CCNC: 17 U/L — SIGNIFICANT CHANGE UP (ref 15–37)
BASOPHILS # BLD AUTO: 0.06 K/UL — SIGNIFICANT CHANGE UP (ref 0–0.2)
BASOPHILS NFR BLD AUTO: 0.7 % — SIGNIFICANT CHANGE UP (ref 0–2)
BILIRUB SERPL-MCNC: 0.8 MG/DL — SIGNIFICANT CHANGE UP (ref 0.2–1.2)
BUN SERPL-MCNC: 20 MG/DL — SIGNIFICANT CHANGE UP (ref 7–23)
CALCIUM SERPL-MCNC: 9.4 MG/DL — SIGNIFICANT CHANGE UP (ref 8.5–10.1)
CHLORIDE SERPL-SCNC: 105 MMOL/L — SIGNIFICANT CHANGE UP (ref 96–108)
CO2 SERPL-SCNC: 32 MMOL/L — HIGH (ref 22–31)
CREAT SERPL-MCNC: 1.1 MG/DL — SIGNIFICANT CHANGE UP (ref 0.5–1.3)
EOSINOPHIL # BLD AUTO: 0.14 K/UL — SIGNIFICANT CHANGE UP (ref 0–0.5)
EOSINOPHIL NFR BLD AUTO: 1.7 % — SIGNIFICANT CHANGE UP (ref 0–6)
GLUCOSE SERPL-MCNC: 122 MG/DL — HIGH (ref 70–99)
HCT VFR BLD CALC: 41.8 % — SIGNIFICANT CHANGE UP (ref 39–50)
HGB BLD-MCNC: 13.2 G/DL — SIGNIFICANT CHANGE UP (ref 13–17)
IMM GRANULOCYTES NFR BLD AUTO: 0.4 % — SIGNIFICANT CHANGE UP (ref 0–1.5)
LYMPHOCYTES # BLD AUTO: 1.11 K/UL — SIGNIFICANT CHANGE UP (ref 1–3.3)
LYMPHOCYTES # BLD AUTO: 13.6 % — SIGNIFICANT CHANGE UP (ref 13–44)
MCHC RBC-ENTMCNC: 28 PG — SIGNIFICANT CHANGE UP (ref 27–34)
MCHC RBC-ENTMCNC: 31.6 GM/DL — LOW (ref 32–36)
MCV RBC AUTO: 88.7 FL — SIGNIFICANT CHANGE UP (ref 80–100)
MONOCYTES # BLD AUTO: 1.16 K/UL — HIGH (ref 0–0.9)
MONOCYTES NFR BLD AUTO: 14.2 % — HIGH (ref 2–14)
NEUTROPHILS # BLD AUTO: 5.67 K/UL — SIGNIFICANT CHANGE UP (ref 1.8–7.4)
NEUTROPHILS NFR BLD AUTO: 69.4 % — SIGNIFICANT CHANGE UP (ref 43–77)
NRBC # BLD: 0 /100 WBCS — SIGNIFICANT CHANGE UP (ref 0–0)
PLATELET # BLD AUTO: 174 K/UL — SIGNIFICANT CHANGE UP (ref 150–400)
POTASSIUM SERPL-MCNC: 4.6 MMOL/L — SIGNIFICANT CHANGE UP (ref 3.5–5.3)
POTASSIUM SERPL-SCNC: 4.6 MMOL/L — SIGNIFICANT CHANGE UP (ref 3.5–5.3)
PROT SERPL-MCNC: 7.3 G/DL — SIGNIFICANT CHANGE UP (ref 6–8.3)
RBC # BLD: 4.71 M/UL — SIGNIFICANT CHANGE UP (ref 4.2–5.8)
RBC # FLD: 13.9 % — SIGNIFICANT CHANGE UP (ref 10.3–14.5)
SODIUM SERPL-SCNC: 141 MMOL/L — SIGNIFICANT CHANGE UP (ref 135–145)
WBC # BLD: 8.17 K/UL — SIGNIFICANT CHANGE UP (ref 3.8–10.5)
WBC # FLD AUTO: 8.17 K/UL — SIGNIFICANT CHANGE UP (ref 3.8–10.5)

## 2019-08-03 PROCEDURE — 99284 EMERGENCY DEPT VISIT MOD MDM: CPT

## 2019-08-03 PROCEDURE — 99284 EMERGENCY DEPT VISIT MOD MDM: CPT | Mod: 25

## 2019-08-03 PROCEDURE — 80053 COMPREHEN METABOLIC PANEL: CPT

## 2019-08-03 PROCEDURE — 71045 X-RAY EXAM CHEST 1 VIEW: CPT

## 2019-08-03 PROCEDURE — 85027 COMPLETE CBC AUTOMATED: CPT

## 2019-08-03 PROCEDURE — 71045 X-RAY EXAM CHEST 1 VIEW: CPT | Mod: 26

## 2019-08-03 PROCEDURE — 93010 ELECTROCARDIOGRAM REPORT: CPT

## 2019-08-03 PROCEDURE — 93005 ELECTROCARDIOGRAM TRACING: CPT

## 2019-08-03 PROCEDURE — 36415 COLL VENOUS BLD VENIPUNCTURE: CPT

## 2019-08-03 RX ORDER — METOPROLOL TARTRATE 50 MG
25 TABLET ORAL ONCE
Refills: 0 | Status: COMPLETED | OUTPATIENT
Start: 2019-08-03 | End: 2019-08-03

## 2019-08-03 RX ORDER — METOPROLOL TARTRATE 50 MG
5 TABLET ORAL ONCE
Refills: 0 | Status: DISCONTINUED | OUTPATIENT
Start: 2019-08-03 | End: 2019-08-03

## 2019-08-03 RX ADMIN — Medication 25 MILLIGRAM(S): at 13:45

## 2019-08-03 NOTE — ED ADULT TRIAGE NOTE - CHIEF COMPLAINT QUOTE
"I had a sore throat the other day and I felt like I had a cold. I went to Urgent Care today and they want me to be checked out to see if I have pneumonia. When I cough, there's blood in it"

## 2019-08-03 NOTE — ED ADULT NURSE NOTE - PMH
HTN (hypertension)    Hypothyroidism    Skin cancer  unknown type s/p chemo 10-15 years ago, s/p resection  Urinary retention

## 2019-08-03 NOTE — ED PROVIDER NOTE - OBJECTIVE STATEMENT
89 y/o M from home sent in by urgent care to r/o KRISTIN.  Pt with c/o nonproductive cough x 4 days.  Pt denies fevers, chills, chest pain, SOB, recent travel or sick contacts. Pt with high BP and tachy in the ED.  Pt forgot to take metoprolol last night and this morning.

## 2019-08-03 NOTE — ED ADULT NURSE NOTE - NSIMPLEMENTINTERV_GEN_ALL_ED
Implemented All Fall Risk Interventions:  [place] to call system. Call bell, personal items and telephone within reach. Instruct patient to call for assistance. Room bathroom lighting operational. Non-slip footwear when patient is off stretcher. Physically safe environment: no spills, clutter or unnecessary equipment. Stretcher in lowest position, wheels locked, appropriate side rails in place. Provide visual cue, wrist band, yellow gown, etc. Monitor gait and stability. Monitor for mental status changes and reorient to person, place, and time. Review medications for side effects contributing to fall risk. Reinforce activity limits and safety measures with patient and family.

## 2019-08-03 NOTE — ED ADULT NURSE NOTE - OBJECTIVE STATEMENT
patient came in ED from Urgent Care sent to ED for sore throat since Tuesday and cough X few day. afebrile. non-labored respiration noted. lungs clear and equal. denies chest pain or discomfort. abdomen nondistended, non-tender. lower leg noted with +1 pitting edema which the Daughter said it is a long time problem for him. EKG done on the monitor, Afib noted. Dr Luis s/e patient at the bedside. will monitor.

## 2019-08-22 ENCOUNTER — INPATIENT (INPATIENT)
Facility: HOSPITAL | Age: 84
LOS: 2 days | Discharge: HOME | DRG: 291 | End: 2019-08-25
Attending: STUDENT IN AN ORGANIZED HEALTH CARE EDUCATION/TRAINING PROGRAM | Admitting: INTERNAL MEDICINE
Payer: MEDICARE

## 2019-08-22 VITALS
WEIGHT: 145.06 LBS | HEIGHT: 63 IN | DIASTOLIC BLOOD PRESSURE: 96 MMHG | SYSTOLIC BLOOD PRESSURE: 176 MMHG | TEMPERATURE: 98 F | HEART RATE: 82 BPM | RESPIRATION RATE: 18 BRPM | OXYGEN SATURATION: 99 %

## 2019-08-22 DIAGNOSIS — K21.9 GASTRO-ESOPHAGEAL REFLUX DISEASE WITHOUT ESOPHAGITIS: ICD-10-CM

## 2019-08-22 DIAGNOSIS — Z29.9 ENCOUNTER FOR PROPHYLACTIC MEASURES, UNSPECIFIED: ICD-10-CM

## 2019-08-22 DIAGNOSIS — E03.9 HYPOTHYROIDISM, UNSPECIFIED: ICD-10-CM

## 2019-08-22 DIAGNOSIS — I10 ESSENTIAL (PRIMARY) HYPERTENSION: ICD-10-CM

## 2019-08-22 DIAGNOSIS — I50.9 HEART FAILURE, UNSPECIFIED: ICD-10-CM

## 2019-08-22 LAB
ALBUMIN SERPL ELPH-MCNC: 3.7 G/DL — SIGNIFICANT CHANGE UP (ref 3.3–5)
ALP SERPL-CCNC: 90 U/L — SIGNIFICANT CHANGE UP (ref 40–120)
ALT FLD-CCNC: 56 U/L — SIGNIFICANT CHANGE UP (ref 12–78)
ANION GAP SERPL CALC-SCNC: 7 MMOL/L — SIGNIFICANT CHANGE UP (ref 5–17)
APTT BLD: 25.2 SEC — LOW (ref 28.5–37)
AST SERPL-CCNC: 27 U/L — SIGNIFICANT CHANGE UP (ref 15–37)
BASOPHILS # BLD AUTO: 0.05 K/UL — SIGNIFICANT CHANGE UP (ref 0–0.2)
BASOPHILS NFR BLD AUTO: 0.5 % — SIGNIFICANT CHANGE UP (ref 0–2)
BILIRUB SERPL-MCNC: 0.8 MG/DL — SIGNIFICANT CHANGE UP (ref 0.2–1.2)
BUN SERPL-MCNC: 25 MG/DL — HIGH (ref 7–23)
CALCIUM SERPL-MCNC: 8.9 MG/DL — SIGNIFICANT CHANGE UP (ref 8.5–10.1)
CHLORIDE SERPL-SCNC: 106 MMOL/L — SIGNIFICANT CHANGE UP (ref 96–108)
CK MB BLD-MCNC: 4.5 % — HIGH (ref 0–3.5)
CK MB CFR SERPL CALC: 3.8 NG/ML — HIGH (ref 0–3.6)
CK SERPL-CCNC: 84 U/L — SIGNIFICANT CHANGE UP (ref 26–308)
CO2 SERPL-SCNC: 30 MMOL/L — SIGNIFICANT CHANGE UP (ref 22–31)
CREAT SERPL-MCNC: 1.2 MG/DL — SIGNIFICANT CHANGE UP (ref 0.5–1.3)
EOSINOPHIL # BLD AUTO: 0.01 K/UL — SIGNIFICANT CHANGE UP (ref 0–0.5)
EOSINOPHIL NFR BLD AUTO: 0.1 % — SIGNIFICANT CHANGE UP (ref 0–6)
GLUCOSE SERPL-MCNC: 136 MG/DL — HIGH (ref 70–99)
HCT VFR BLD CALC: 40.1 % — SIGNIFICANT CHANGE UP (ref 39–50)
HGB BLD-MCNC: 12.6 G/DL — LOW (ref 13–17)
IMM GRANULOCYTES NFR BLD AUTO: 0.6 % — SIGNIFICANT CHANGE UP (ref 0–1.5)
INR BLD: 1.26 RATIO — HIGH (ref 0.88–1.16)
LACTATE SERPL-SCNC: 1.6 MMOL/L — SIGNIFICANT CHANGE UP (ref 0.7–2)
LACTATE SERPL-SCNC: 2.7 MMOL/L — HIGH (ref 0.7–2)
LYMPHOCYTES # BLD AUTO: 0.64 K/UL — LOW (ref 1–3.3)
LYMPHOCYTES # BLD AUTO: 6.8 % — LOW (ref 13–44)
MCHC RBC-ENTMCNC: 27.6 PG — SIGNIFICANT CHANGE UP (ref 27–34)
MCHC RBC-ENTMCNC: 31.4 GM/DL — LOW (ref 32–36)
MCV RBC AUTO: 87.7 FL — SIGNIFICANT CHANGE UP (ref 80–100)
MONOCYTES # BLD AUTO: 0.8 K/UL — SIGNIFICANT CHANGE UP (ref 0–0.9)
MONOCYTES NFR BLD AUTO: 8.5 % — SIGNIFICANT CHANGE UP (ref 2–14)
NEUTROPHILS # BLD AUTO: 7.8 K/UL — HIGH (ref 1.8–7.4)
NEUTROPHILS NFR BLD AUTO: 83.5 % — HIGH (ref 43–77)
NRBC # BLD: 0 /100 WBCS — SIGNIFICANT CHANGE UP (ref 0–0)
NT-PROBNP SERPL-SCNC: 9281 PG/ML — HIGH (ref 0–450)
PLATELET # BLD AUTO: 249 K/UL — SIGNIFICANT CHANGE UP (ref 150–400)
POTASSIUM SERPL-MCNC: 3.5 MMOL/L — SIGNIFICANT CHANGE UP (ref 3.5–5.3)
POTASSIUM SERPL-SCNC: 3.5 MMOL/L — SIGNIFICANT CHANGE UP (ref 3.5–5.3)
PROCALCITONIN SERPL-MCNC: <0.05 — SIGNIFICANT CHANGE UP (ref 0–0.04)
PROT SERPL-MCNC: 7.3 G/DL — SIGNIFICANT CHANGE UP (ref 6–8.3)
PROTHROM AB SERPL-ACNC: 14.3 SEC — HIGH (ref 10–12.9)
RBC # BLD: 4.57 M/UL — SIGNIFICANT CHANGE UP (ref 4.2–5.8)
RBC # FLD: 14.5 % — SIGNIFICANT CHANGE UP (ref 10.3–14.5)
SODIUM SERPL-SCNC: 143 MMOL/L — SIGNIFICANT CHANGE UP (ref 135–145)
TROPONIN I SERPL-MCNC: 0.04 NG/ML — SIGNIFICANT CHANGE UP (ref 0.01–0.04)
WBC # BLD: 9.36 K/UL — SIGNIFICANT CHANGE UP (ref 3.8–10.5)
WBC # FLD AUTO: 9.36 K/UL — SIGNIFICANT CHANGE UP (ref 3.8–10.5)

## 2019-08-22 PROCEDURE — 99223 1ST HOSP IP/OBS HIGH 75: CPT | Mod: AI,GC

## 2019-08-22 PROCEDURE — 99223 1ST HOSP IP/OBS HIGH 75: CPT

## 2019-08-22 PROCEDURE — 71045 X-RAY EXAM CHEST 1 VIEW: CPT | Mod: 26

## 2019-08-22 PROCEDURE — 93010 ELECTROCARDIOGRAM REPORT: CPT

## 2019-08-22 PROCEDURE — 99285 EMERGENCY DEPT VISIT HI MDM: CPT

## 2019-08-22 PROCEDURE — 93970 EXTREMITY STUDY: CPT | Mod: 26

## 2019-08-22 RX ORDER — LABETALOL HCL 100 MG
5 TABLET ORAL ONCE
Refills: 0 | Status: COMPLETED | OUTPATIENT
Start: 2019-08-22 | End: 2019-08-22

## 2019-08-22 RX ORDER — RIVAROXABAN 15 MG-20MG
1 KIT ORAL
Qty: 0 | Refills: 0 | DISCHARGE

## 2019-08-22 RX ORDER — PANTOPRAZOLE SODIUM 20 MG/1
40 TABLET, DELAYED RELEASE ORAL
Refills: 0 | Status: DISCONTINUED | OUTPATIENT
Start: 2019-08-22 | End: 2019-08-25

## 2019-08-22 RX ORDER — LEVOTHYROXINE SODIUM 125 MCG
75 TABLET ORAL DAILY
Refills: 0 | Status: DISCONTINUED | OUTPATIENT
Start: 2019-08-22 | End: 2019-08-25

## 2019-08-22 RX ORDER — METOPROLOL TARTRATE 50 MG
50 TABLET ORAL DAILY
Refills: 0 | Status: DISCONTINUED | OUTPATIENT
Start: 2019-08-22 | End: 2019-08-25

## 2019-08-22 RX ORDER — FUROSEMIDE 40 MG
40 TABLET ORAL ONCE
Refills: 0 | Status: COMPLETED | OUTPATIENT
Start: 2019-08-22 | End: 2019-08-22

## 2019-08-22 RX ORDER — IPRATROPIUM/ALBUTEROL SULFATE 18-103MCG
3 AEROSOL WITH ADAPTER (GRAM) INHALATION EVERY 6 HOURS
Refills: 0 | Status: DISCONTINUED | OUTPATIENT
Start: 2019-08-22 | End: 2019-08-25

## 2019-08-22 RX ORDER — ENOXAPARIN SODIUM 100 MG/ML
40 INJECTION SUBCUTANEOUS DAILY
Refills: 0 | Status: DISCONTINUED | OUTPATIENT
Start: 2019-08-22 | End: 2019-08-25

## 2019-08-22 RX ORDER — FUROSEMIDE 40 MG
40 TABLET ORAL DAILY
Refills: 0 | Status: DISCONTINUED | OUTPATIENT
Start: 2019-08-22 | End: 2019-08-23

## 2019-08-22 RX ORDER — ASPIRIN/CALCIUM CARB/MAGNESIUM 324 MG
1 TABLET ORAL
Qty: 0 | Refills: 0 | DISCHARGE

## 2019-08-22 RX ADMIN — ENOXAPARIN SODIUM 40 MILLIGRAM(S): 100 INJECTION SUBCUTANEOUS at 18:25

## 2019-08-22 RX ADMIN — Medication 5 MILLIGRAM(S): at 13:44

## 2019-08-22 RX ADMIN — Medication 40 MILLIGRAM(S): at 11:45

## 2019-08-22 NOTE — H&P ADULT - NSHPREVIEWOFSYSTEMS_GEN_ALL_CORE
CONSTITUTIONAL: denies fever, chills, fatigue, weakness  HEENT: denies blurred vision, sore throat  SKIN: denies new lesions, rash  CARDIOVASCULAR: denies chest pain, chest pressure, palpitations  RESPIRATORY: + shortness of breath, + minimal sputum production  GASTROINTESTINAL: denies nausea, vomiting, diarrhea, abdominal pain  GENITOURINARY: denies dysuria, discharge  NEUROLOGICAL: denies numbness, headache, focal weakness  MUSCULOSKELETAL: denies new joint pain, muscle aches  HEMATOLOGIC: denies gross bleeding, bruising  LYMPHATICS: denies enlarged lymph nodes, + chronic extremity swelling bl  PSYCHIATRIC: denies recent changes in anxiety, depression  ENDOCRINOLOGIC: denies sweating, cold or heat intolerance

## 2019-08-22 NOTE — H&P ADULT - NSHPLABSRESULTS_GEN_ALL_CORE
EKG (personally reviewed): Sinus Tachy, PACs Incomplete RBBB, unchanged from previous   CXR (personally reviewed): There are bibasilar and perihilar opacities suggestive of edema.

## 2019-08-22 NOTE — H&P ADULT - HISTORY OF PRESENT ILLNESS
89 male pmh of HTN, Hypothyroid, Mild MR, incomp RBBB, EF 55%  (echo 5/2019) , skin ca  s/p chemo (10-15 years ago) presents , last admission J 5/2019 for fall and SVT ( loop recorder)    VS  Labs INR 1.2 BUN 30, Lactate 2.7  EKG  Imaging :CXR : There are bibasilar and   perihilar opacities suggestive of edema.       Received Lasix 89 male pmh of Afib (not on AC 2/2 hemoptysis 8/9)  HTN, Hypothyroid, Mild-Mod MR, incomp RBBB, EF 55%  (echo 5/2019) , skin ca  s/p chemo (10-15 years ago) presents , last admission [facility] 5/2019 for fall and new onset Afib now presenting for SOB for past several days acutely worsening last night. Associated with wheezing. He feels like he cannot breath since last night. Improved after the oxygen and lasix in the ED.  SOB associated with cough x 2-3 weeks since viral infection that he cought from his granddaughter. 2-3 weeks ago pt had sore throat and malaise which improved with lingering cough.  Of note, patient's cardiologist stopped his Xarelto (for Afib) when he had hemoptysis from his previous illness in early August.  Now, his cough brings up minimal mucus, mostly dry. Denies CP, palpitations, fevers/chills, myalgias, joint pain, recent falls, dizziness, HA, blurred vision.    VS  -120s on telemonitor sinus tachy, BP max 198/106  RR   SPO2   Labs INR 1.2 BUN 30, Lactate 2.7  EKG; Sinus Tachy, PACs Incomplete RBBB, unchanged from previous   Imaging :CXR : There are bibasilar and perihilar opacities suggestive of edema.   Received Lasix 40 IV in ED 89 yo male pmh of Afib (not on AC 2/2 hemoptysis 8/9),  HTN, Hypothyroid, Mild-Mod MR, incomp RBBB, EF 55%  (echo 5/2019), Nonhodgkin's Lymphoma (s/p resection and chemo, 2004) presents , last admission [facility] 5/2019 for fall and new onset Afib now presenting for SOB for past several days acutely worsening last night. Associated with wheezing. He feels like he cannot breath since last night. Improved after the oxygen and lasix in the ED.  SOB associated with cough x 2-3 weeks since viral infection that he cought from his granddaughter. 2-3 weeks ago pt had sore throat and malaise which improved with lingering cough.  Of note, patient's cardiologist stopped his Xarelto (for Afib) when he had hemoptysis from his previous illness in early August.  Now, his cough brings up minimal mucus, mostly dry. Denies CP, palpitations, fevers/chills, myalgias, joint pain, recent falls, dizziness, HA, blurred vision.    VS T 98 oral  -120s on telemonitor sinus tachy, BP max 198/106  RR  18 SPO2 93 on RA   Labs INR 1.2 BUN 30, Lactate 2.7  EKG; Sinus Tachy, PACs Incomplete RBBB, unchanged from previous   Imaging :CXR : There are bibasilar and perihilar opacities suggestive of edema.   Received Lasix 40 IV in ED 89yo male PMH of Afib (not on AC 2/2 hemoptysis 8/9),  HTN, Hypothyroid, Mild-Mod MR, incomp RBBB, EF 55%  (echo 5/2019), Non-Hodgkin's Lymphoma (s/p resection and chemo, 2004), last admission [facility] 5/2019 for fall and new onset Afib, now presenting for worsening SOB for past several weeks acutely worsening last night. Associated with wheezing. He feels like he cannot breath since last night. SOB associated with cough x 2-3 weeks since a presumed viral infection that he possibly caught from his granddaughter. Symptoms are significantly exacerbated by lying down. Pt has also had worsening LE edema. Two to three weeks ago pt had sore throat and malaise which improved with lingering cough.  Of note, patient's cardiologist stopped his Xarelto (for Afib) when he had hemoptysis from his previous illness in early August. Now, his cough brings up minimal mucus, mostly dry. Pt has not had diagnosis of CHF. Pt's diet is rich in salt. Denies CP, palpitations, fevers/chills, myalgias, joint pain, recent falls, dizziness, HA, blurred vision. Improved symptomatically after the oxygen and lasix 40mg IV x1 in the ED.     VS T 98 oral  -120s on telemonitor sinus tachy, BP max 198/106  RR: 18 SPO2: 93 on RA   Labs INR 1.2 BUN 30, Lactate 2.7  EKG; Sinus Tachy, PACs Incomplete RBBB, unchanged from previous   Imaging :CXR : There are bibasilar and perihilar opacities suggestive of edema.   Received Lasix 40mg IV x1 in ED.

## 2019-08-22 NOTE — CONSULT NOTE ADULT - SUBJECTIVE AND OBJECTIVE BOX
Patient was examined Chart reviewed Detailed note will be inserted below after going over latest data Meanwhile please refer to my previous notes for Pulmonary/Critical Care assessment recommendations CORDELL FIORE   861  5/10/1928 [address] GALINDO DIAS    ALLERGY      nka   CONTACT [facility]  [address] [place] [id number] [phone]      Initial evaluation/Pulmonary Critical Care consultation requested on 2019    by Dr Bean  from Dr Marmolejo covering Dr Madison  Patient examined chart reviewed    HOSPITAL ADMISSION   PATIENT CAME  FROM (if information available)      PATIENT  CORDELL FIORE   861  5/10/1928 [address] GALIND[address] DIAS                           PT DESCRIPTION       This section was excerpted from ER md note but was independently verified by me    · Chief Complaint: The patient is a 89y Male complaining of difficulty breathing.  · HPI Objective Statement: pmd - cristi mcnally  pt c/o worsening sob, with productive cough x weeks. no fever, chills, ha, cp, abd pain, d/n/v, Pt and family relates b/l le edema, no change form baseline.  · Presenting Symptoms: COUGH, SHORTNESS OF BREATH  · Negative Findings: no chest pain, no chills, no fever, no headache  · Timing: gradual onset  · Duration: week(s)  · Quality: alteration in breathing pattern, productive cough  · Context: unknown  · Recent Exposure To: none known  · Aggravated Factors: none  · Relieving Factors: none    PAST MEDICAL/SURGICAL/FAMILY/SOCIAL HISTORY:    Past Medical History:  HTN (hypertension)    Hypothyroidism    Skin cancer  unknown type s/p chemo 10-15 years ago, s/p resection  Urinary retention.     Past Surgical History:  No significant past surgical history.    · Social Concerns: None     Tobacco Usage:  · Tobacco Usage Former smoker    ALLERGIES AND HOME MEDICATIONS:   Allergies:        Allergies:  No Known Allergies:     Home Medications:   * Patient Currently Takes Medications as of 03-Aug-2019 13:22 documented in Structured Notes  · pantoprazole 40 mg oral delayed release tablet: 1 tab(s) orally once a day (before a meal)  · metoprolol tartrate 25 mg oral tablet: 1 tab(s) orally 2 times a day  · ibuprofen 600 mg oral tablet: 1 tab(s) orally every 6 hours, As needed, Mild Pain (1 - 3), Moderate Pain (4 - 6), Severe Pain (7 - 10)  · Synthroid 75 mcg (0.075 mg) oral tablet: 1 tab(s) orally once a day  · Xarelto 20 mg oral tablet: 1 tab(s) orally once a day (in the evening)  · aspirin 81 mg oral tablet: 1 tab(s) orally once a day    REVIEW OF SYSTEMS:    Review of Systems:  · CONSTITUTIONAL: no fever and no chills.  · CARDIOVASCULAR: - - -  · Cardiovascular [+]: PERIPHERAL EDEMA  · Cardiovascular [-]: no chest pain, no palpitations  · RESPIRATORY: - - -  · Respiratory [+]: COUGH, SHORTNESS OF BREATH  · ROS STATEMENT: all other ROS negative except as per HPI    VITAL SIGNS( Pullset):    ,,ED ADULT Flow Sheet:    22-Aug-2019 09:53  · Temp (F): 98  · Temp (C) Temp (C): 36.7  · Temp site Temp Site: oral  · Heart Rate Heart Rate (beats/min): 82  · BP Systolic Systolic: 176  · BP Diastolic Diastolic (mm Hg): 96  · Respiration Rate (breaths/min) Respiration Rate (breaths/min): 18  · SpO2 (%) SpO2 (%): 99  · O2 delivery Patient On: room air  · Dosing Weight (KILOGRAMS) Dosing Weight (KILOGRAMS): 65.8  · Dosing Weight  (POUNDS) Dosing Weight (POUNDS): 145  · Height type Height Type: stated  · Height (FEET) Height (FEET): 5  · Height (INCHES) Height (INCHES): 3  · Height (CENTIMETERS) Height (CENTIMETERS): 160.02  · BSA (m2): 1.69  · BMI (kG/m2) BMI (kG/m2): 25.7  · Presence of Pain: denies pain/discomfort  · Pain Rating (0-10): Rest: 0  · Pain Rating (0-10): Activity: 0  · SpO2 (%) SpO2 (%): 99  · O2 delivery Patient On: room air  · Preferred Language to Address Healthcare Preferred Language to Address Healthcare: English    09:57  · Presence of Pain: denies pain/discomfort  · Pain Rating (0-10): Rest: 0  · Pain Rating (0-10): Activity: 0  · Preferred Language to Address Healthcare Preferred Language to Address Healthcare: English  · Patient Belongings Patient Belongings:: Clothing  · Extensions of Self Extensions of Self: None    PHYSICAL EXAM:   · CONSTITUTIONAL: Well appearing, well nourished, awake, alert, oriented to person, place, time/situation and in no apparent distress.  · ENMT: Airway patent. Mouth with normal mucosa  · CARDIAC: - - -  · CARDIAC RHYTHM: regular  · CARDIAC RATE: normal  · CARDIAC SOUNDS: S1-S2  · CARDIAC PEDAL EDEMA: b/l le's  · CARDIAC PULSES: normal bilaterally  · RESPIRATORY: - - -  · Respiratory Distress: no  · Breath Sounds: normal  · GASTROINTESTINAL: Abdomen soft, non-tender, no guarding.  · MUSCULOSKELETAL: Spine appears normal, range of motion is not limited, no muscle or joint tenderness  · NEUROLOGICAL: Alert and oriented, no focal deficits, no motor or sensory deficits.      PATIENT CORDELL FIORE   89  5/10/1928 [address] GALINDO DIAS      VITALS/LABS       2019 afeb 89 177/99 18 99%   2019 W 9.3 Hb 12.6 Plt 249  Na 143 K 3.5 CO2 30 Cr 1.2   2019 pc n   2019 LA 2.7-1.6   2019 tr1  n   2019 BNP 9281   2019 CXR perihilar and basal opac suggestive of edema cm Loop recorder   2019 echo Mod MR ef 55%     GLOBAL ISSUE/BEST PRACTICE:      PROBLEM: HOB elevation:   y            PROBLEM: Stress ulcer proph:    na                      PROBLEM: VTE prophylaxis:    lvnx 40 (2019)   PROBLEM: Glycemic control:    na  PROBLEM: Nutrition:   dash (20190   PROBLEM: Advanced directive: na     PROBLEM: Allergies:  na

## 2019-08-22 NOTE — ED ADULT NURSE REASSESSMENT NOTE - NSIMPLEMENTINTERV_GEN_ALL_ED
Implemented All Fall with Harm Risk Interventions:  [place] to call system. Call bell, personal items and telephone within reach. Instruct patient to call for assistance. Room bathroom lighting operational. Non-slip footwear when patient is off stretcher. Physically safe environment: no spills, clutter or unnecessary equipment. Stretcher in lowest position, wheels locked, appropriate side rails in place. Provide visual cue, wrist band, yellow gown, etc. Monitor gait and stability. Monitor for mental status changes and reorient to person, place, and time. Review medications for side effects contributing to fall risk. Reinforce activity limits and safety measures with patient and family. Provide visual clues: red socks.

## 2019-08-22 NOTE — CONSULT NOTE ADULT - ASSESSMENT
89 yo male PMHx of Afib (not on AC 2/2 hemoptysis 8/9), HTN, Hypothyroid, Mild-Mod MR, incomplete RBBB, CHF pEF 55% (echo 5/2019), Nonhodgkin's Lymphoma (s/p resection and chemo, 2004) presents for SOB for past several days acutely worsening last night. Associated with wheezing. Admitted for acute decompensated HF and hypertensive emergency.     Acute decompensated HF   -Pt with evidence of volume overload on clinical examination --   -pro-BNP 9281  -CXR shows bibasilar and perihilar opacities suggestive of edema    -Lasix 40mg IV BID   -EKG: Sinus tachy, PACs, Incomplete RBBB, unchanged from previous  -Previous ECHO on 5/2019 shows EF 55%, mild-mod mitral valvular disease noted    Hypertensive emergency   -At presentation /96, peaked at 195/106, now 169-96. Pt s/p labetalol 5mg IV push x1  -Would push Lopressor 5mg if SBP > 180  -Continue home Metoprolol 50mg PO qd  -Monitor routine hemodynamics  -Monitor and replete lytes, keep K>4, Mg>2    -CKs elevated     -Other cardiovascular workup will depend on clinical course.  -All other workup per primary team  -Will follow 89 yo male PMHx of Afib (not on AC 2/2 hemoptysis 8/9), HTN, Hypothyroid, Mild-Mod MR, incomplete RBBB, CHF pEF 55% (echo 5/2019), Nonhodgkin's Lymphoma (s/p resection and chemo, 2004) presents for SOB for past several days acutely worsening last night. Associated with wheezing. Admitted for acute decompensated HF and hypertensive emergency.     Acute decompensated HF   -Pt with evidence of volume overload on clinical examination --   -pro-BNP 9281  -CXR shows bibasilar and perihilar opacities suggestive of edema    -Lasix 40mg IV Qday  - Please continue to maintain strict I/Os, monitor daily weights, Cr, and K.    -EKG: Sinus tachy, PACs,  RBBB, unchanged from previous  -Previous ECHO on 5/2019 shows EF 55%, mild-mod mitral valvular disease noted    Hypertensive emergency   -At presentation /96, peaked at 195/106, now 169-96. Pt s/p labetalol 5mg IV push x1  -Continue home Metoprolol 50mg PO qd  - Monitor BP with diuresis.   - Appears that BP may be chronically high. Possibly start ARB  -Monitor routine hemodynamics  -Monitor and replete lytes, keep K>4, Mg>2    - Doubt ACS event. Raymond neg    -Other cardiovascular workup will depend on clinical course.  -All other workup per primary team  -Will follow

## 2019-08-22 NOTE — CONSULT NOTE ADULT - ATTENDING COMMENTS
I saw and examined the patient personally. Spoke with above provider regarding this case. I reviewed the above findings completely.  I agree with the above history, physical, and plan which I have edited where appropriate.   Pt likely with ADHF from HTN emergency in setting of high Na intake.   - Diuresis with Lasix 40mg IV Qday Please continue to maintain strict I/Os, monitor daily weights, Cr, and K.  - keep net neg  - HTN emergency. Cont toprol. Add losartan if needed.   - monitor tele

## 2019-08-22 NOTE — ED ADULT NURSE NOTE - OBJECTIVE STATEMENT
Received pt in bed alert.  C/O difficulty breathing x 1 month.  Pt has had multiple falls in past (fell in may) and broke 3 ribs and bruised other side.  Since then he has been having respiratory  difficulties. Pt has past hx of bronchitis 3 weeks ago.  EKG completed.  Pt on cardaic monitor.  Benja placed and blood work sent to lab.  Ongoing nursing care and safety maintained. Received pt in bed alert.  C/O difficulty breathing x 1 month.  Pt has had multiple falls in past (fell in may) and broke 3 ribs and bruised other side.  Since then he has been having respiratory  difficulties. Pt has past hx of bronchitis 3 weeks ago.  EKG completed.  Pt on cardiac monitor.  Benja placed and blood work sent to lab.  Ongoing nursing care and safety maintained.

## 2019-08-22 NOTE — H&P ADULT - NSHPSOCIALHISTORY_GEN_ALL_CORE
Lives alone, , retired  worker  	previous smoker, 6 pack year history, quit 20 years ago  minimal etoh use, no illicit drug use Moved in with daughter a couple of months ago after fall in May 2019, , retired  worker  previous smoker, 6 pack year history, quit 20 years ago  minimal etoh use, no illicit drug use

## 2019-08-22 NOTE — H&P ADULT - ATTENDING COMMENTS
Pt seen + examined. Case discussed with intern/resident. Agree with assessment and plan above (edited) with following addendum:  Time spent: 75min. More than 50% of the visit was spent counseling the patient on medical condition -- hypertensive emergency, CHF, pulm edema.    89yo male PMH of Afib (not on AC 2/2 hemoptysis 8/9),  HTN, Hypothyroid, Mild-Mod MR, incomp RBBB, EF 55%  (echo 5/2019), Non-Hodgkin's Lymphoma (s/p resection and chemo, 2004), last admission [facility] 5/2019 for fall and new onset Afib, now presenting for worsening SOB for past several weeks acutely worsening last night admitted with acute pulmonary edema likely due to acute diastolic CHF in setting of hypertensive emergency.  - acute diastolic CHF in setting of hypertensive emergency likely due to gradual fluid retention from high salt intake  - Gave Labetolol 5 mg IV Push STAT and lasix 40IV x1 given in the ER  - pt symptomatically improving and BP starting to come down  - will continue Lasix 40mg IV daily   - Continue home metoprolol succ ER 50 daily; will add agents if BP does not stabilize with diuresis  - Cardiology consulted, Dr. David Lo  - no sign of acute ischemia on EKG but will r/out with 3 sets of Raymond  - monitor on tele  - if pt went into paroxysmal afib with RVR prior to presentation to the ER, that may have contributed to the pulmonary edema -- currently in SR  - pulm consulted by ER, f/up recs

## 2019-08-22 NOTE — H&P ADULT - PROBLEM SELECTOR PLAN 3
Stable,  Continue home levothyroxine 75 mcg daily -Continue home levothyroxine 75 mcg daily  -check TSH

## 2019-08-22 NOTE — CONSULT NOTE ADULT - SUBJECTIVE AND OBJECTIVE BOX
[facility]    Lucia Brady, Tray, Concepcion, Renetta, Wally, Ziggy      [phone]    CHIEF COMPLAINT: Patient is a 89 y/o male who presents with a chief complaint of acute decompensated heart failure with HTN emergency     HPI: 89 yo male PMHx of Afib (not on AC 2/2 hemoptysis 8/9), HTN, Hypothyroid, Mild-Mod MR, incomplete RBBB, CHF pEF 55% (echo 5/2019), Nonhodgkin's Lymphoma (s/p resection and chemo, 2004) presents for SOB for past several days acutely worsening last night. Associated with wheezing. He feels like he cannot breath since last night. Improved after the oxygen and lasix in the ED.  SOB associated with cough x 2-3 weeks since viral infection that he cought from his granddaughter. 2-3 weeks ago, pt had sore throat and malaise which improved with lingering cough.  Of note, patient's cardiologist stopped his Xarelto (for Afib) when he had hemoptysis from his previous illness in early August.  Now, his cough brings up minimal mucus, mostly dry. Denies CP, palpitations, fevers/chills, myalgias, joint pain, recent falls, dizziness, HA, blurred vision.    Last admission [facility] 5/2019 for fall and new onset Afib     ED  VS T 98 oral  -120s on telemonitor sinus tachy, BP max 198/106  RR 18 SPO2 93 on RA   Labs INR 1.2 BUN 30, Lactate 2.7  Pt received Lasix 40 IV x1 in ED  EKG: Sinus Tachy, PACs Incomplete RBBB, unchanged from previous   CXR: There are bibasilar and perihilar opacities suggestive of edema      PAST MEDICAL & SURGICAL HISTORY:  GERD (gastroesophageal reflux disease)  Non-Hodgkin's lymphoma: Left back s/p resection and chemo  approx 2004  Skin cancer: unknown type s/p chemo 10-15 years ago, s/p resection  HTN (hypertension)  Hypothyroidism  No significant past surgical history      MEDICATIONS  (STANDING):  enoxaparin Injectable 40 milliGRAM(s) SubCutaneous daily  furosemide   Injectable 40 milliGRAM(s) IV Push daily  levothyroxine 75 MICROGram(s) Oral daily  metoprolol succinate ER 50 milliGRAM(s) Oral daily  pantoprazole    Tablet 40 milliGRAM(s) Oral before breakfast    MEDICATIONS  (PRN):      FAMILY HISTORY:    Denies family history of CAD or early MI in mother  Denies family history of CAD or early MI in father      Constitutional: denies fever, chills  HEENT: denies blurry vision, difficulty hearing  Respiratory: denies SOB, HAMEED, cough  Cardiovascular: denies CP, palpitations, orthopnea, PND, LE edema  Gastrointestinal: denies nausea, vomiting, abdominal pain  Genitourinary: denies urinary changes  Skin: Denies rashes, itching  Neurologic: denies headache, weakness, dizziness  Hematology/Oncology: denies bleeding, easy bruising  ROS negative except as noted above      SOCIAL HISTORY:  Former smoker   Drinks wine socially     Vital Signs Last 24 Hrs  T(C): 36.3 (22 Aug 2019 13:50), Max: 36.7 (22 Aug 2019 09:53)  T(F): 97.3 (22 Aug 2019 13:50), Max: 98 (22 Aug 2019 09:53)  HR: 82 (22 Aug 2019 13:50) (82 - 109)  BP: 169/96 (22 Aug 2019 13:50) (169/96 - 195/106)  BP(mean): --  RR: 18 (22 Aug 2019 13:50) (18 - 19)  SpO2: 97% (22 Aug 2019 13:50) (93% - 99%)    Physical Exam:  General: Well developed, NAD, diaphoretic  ENT: NC/AT, EOMI b/l, moist mucous membranes   Neurology: AAOx3, no focal sensory deficits, motor strength grossly intact   Lungs: Lungs CTA b/l, no wheezing, rhonchi or rales   Heart: RRR, +S1/S2, no murmurs, rubs or gallops  GI: Abdomen soft, NTND, +BS x4 quadrants, no palpable masses  Lymph: No clubbing, cyanosis or peripheral edema   MSK: 2+ peripheral pulses    Skin: No obvious ecchymosis or petechiae     I&O's Detail    22 Aug 2019 07:01  -  22 Aug 2019 13:57  --------------------------------------------------------  IN:  Total IN: 0 mL    OUT:    Voided: 1000 mL  Total OUT: 1000 mL    Total NET: -1000 mL      LABS:                        12.6   9.36  )-----------( 249      ( 22 Aug 2019 10:56 )             40.1     08-22    143  |  106  |  25<H>  ----------------------------<  136<H>  3.5   |  30  |  1.20    Ca    8.9      22 Aug 2019 10:56    TPro  7.3  /  Alb  3.7  /  TBili  0.8  /  DBili  x   /  AST  27  /  ALT  56  /  AlkPhos  90  08-22    CARDIAC MARKERS ( 22 Aug 2019 10:56 )  .034 ng/mL / x     / 85 U/L / x     / 4.1 ng/mL      PT/INR - ( 22 Aug 2019 10:56 )   PT: 14.3 sec;   INR: 1.26 ratio         PTT - ( 22 Aug 2019 10:56 )  PTT:25.2 sec      BNPSerum Pro-Brain Natriuretic Peptide: 9281 pg/mL (08-22 @ 10:56)    RADIOLOGY & ADDITIONAL STUDIES:  < from: Xray Chest 1 View AP/PA (08.22.19 @ 10:56) >  COMPARISON: 8/3/2019    FINDINGS:    The cardiac size is mildly enlarged. Aorta is tortuous and   atherosclerotic. Loop recorder again noted overlying the left chest.   There are bibasilar and perihilar opacities suggestive of edema. There   are likely small bilateral pleural effusions. There is no pneumothorax.     IMPRESSION:     Perihilar and bibasilar opacities suggestive of edema. Recommend clinical   correlation for superimposed infection.    < end of copied text > [facility]    Lucia Brady, Tray, Concepcion, Renetta, Wally, Ziggy      [phone]    CHIEF COMPLAINT: Patient is a 89 y/o male who presents with a chief complaint of acute decompensated heart failure with HTN emergency     HPI: 89 yo male PMHx of Afib (not on AC 2/2 hemoptysis 8/9), HTN, Hypothyroid, Mild-Mod MR, incomplete RBBB, CHF pEF 55% (echo 5/2019), Nonhodgkin's Lymphoma (s/p resection and chemo, 2004) presents for SOB for past several days acutely worsening last night. Associated with wheezing. He feels like he cannot breath since last night. Improved after the oxygen and lasix in the ED.  SOB associated with cough x 2-3 weeks since viral infection that he cought from his granddaughter. 2-3 weeks ago, pt had sore throat and malaise which improved with lingering cough.  Of note, patient's cardiologist stopped his Xarelto (for Afib) when he had hemoptysis from his previous illness in early August.  Now, his cough brings up minimal mucus, mostly dry. Denies CP, palpitations, fevers/chills, myalgias, joint pain, recent falls, dizziness, HA, blurred vision.  He has been having more orthopnea for the last 3 weeks. Notes to be eating high Na diet.     Last admission J 5/2019 for fall and new onset Afib     ED  VS T 98 oral  -120s on telemonitor sinus tachy, BP max 198/106  RR 18 SPO2 93 on RA   Labs INR 1.2 BUN 30, Lactate 2.7  Pt received Lasix 40 IV x1 in ED  EKG: Sinus Tachy, PACs Incomplete RBBB, unchanged from previous   CXR: There are bibasilar and perihilar opacities suggestive of edema      PAST MEDICAL & SURGICAL HISTORY:  GERD (gastroesophageal reflux disease)  Non-Hodgkin's lymphoma: Left back s/p resection and chemo  approx 2004  Skin cancer: unknown type s/p chemo 10-15 years ago, s/p resection  HTN (hypertension)  Hypothyroidism  No significant past surgical history      MEDICATIONS  (STANDING):  enoxaparin Injectable 40 milliGRAM(s) SubCutaneous daily  furosemide   Injectable 40 milliGRAM(s) IV Push daily  levothyroxine 75 MICROGram(s) Oral daily  metoprolol succinate ER 50 milliGRAM(s) Oral daily  pantoprazole    Tablet 40 milliGRAM(s) Oral before breakfast       FAMILY HISTORY:    Denies family history of CAD or early MI in mother  Denies family history of CAD or early MI in father  No SCD      Constitutional: denies fever, chills  HEENT: denies blurry vision, difficulty hearing  Respiratory: denies SOB, HAMEED, cough  Cardiovascular: denies CP, palpitations, orthopnea, PND, LE edema  Gastrointestinal: denies nausea, vomiting, abdominal pain  Genitourinary: denies urinary changes  Skin: Denies rashes, itching  Neurologic: denies headache, weakness, dizziness  Hematology/Oncology: denies bleeding, easy bruising  ROS negative except as noted above      SOCIAL HISTORY:  Former smoker   Drinks wine socially   no drugs    Vital Signs Last 24 Hrs  T(C): 36.3 (22 Aug 2019 13:50), Max: 36.7 (22 Aug 2019 09:53)  T(F): 97.3 (22 Aug 2019 13:50), Max: 98 (22 Aug 2019 09:53)  HR: 82 (22 Aug 2019 13:50) (82 - 109)  BP: 169/96 (22 Aug 2019 13:50) (169/96 - 195/106)  BP(mean): --  RR: 18 (22 Aug 2019 13:50) (18 - 19)  SpO2: 97% (22 Aug 2019 13:50) (93% - 99%)    Physical Exam:  General: Well developed, NAD, diaphoretic  ENT: NC/AT, EOMI b/l, moist mucous membranes   Neurology: AAOx3, no focal sensory deficits, motor strength grossly intact   Lungs: Lungs CTA b/l, no wheezing, rhonchi or rales   Heart: RRR, +S1/S2, no murmurs, rubs or gallops  GI: Abdomen soft, NTND, +BS x4 quadrants, no palpable masses  Lymph: No clubbing, cyanosis or peripheral edema   MSK: 2+ peripheral pulses    Skin: No obvious ecchymosis or petechiae   Psych appropriate mood and affect    I&O's Detail    22 Aug 2019 07:01  -  22 Aug 2019 13:57  --------------------------------------------------------  IN:  Total IN: 0 mL    OUT:    Voided: 1000 mL  Total OUT: 1000 mL    Total NET: -1000 mL      LABS:                        12.6   9.36  )-----------( 249      ( 22 Aug 2019 10:56 )             40.1     08-22    143  |  106  |  25<H>  ----------------------------<  136<H>  3.5   |  30  |  1.20    Ca    8.9      22 Aug 2019 10:56    TPro  7.3  /  Alb  3.7  /  TBili  0.8  /  DBili  x   /  AST  27  /  ALT  56  /  AlkPhos  90  08-22    CARDIAC MARKERS ( 22 Aug 2019 10:56 )  .034 ng/mL / x     / 85 U/L / x     / 4.1 ng/mL      PT/INR - ( 22 Aug 2019 10:56 )   PT: 14.3 sec;   INR: 1.26 ratio         PTT - ( 22 Aug 2019 10:56 )  PTT:25.2 sec      BNPSerum Pro-Brain Natriuretic Peptide: 9281 pg/mL (08-22 @ 10:56)    RADIOLOGY & ADDITIONAL STUDIES:  < from: Xray Chest 1 View AP/PA (08.22.19 @ 10:56) >  COMPARISON: 8/3/2019    FINDINGS:    The cardiac size is mildly enlarged. Aorta is tortuous and   atherosclerotic. Loop recorder again noted overlying the left chest.   There are bibasilar and perihilar opacities suggestive of edema. There   are likely small bilateral pleural effusions. There is no pneumothorax.     IMPRESSION:     Perihilar and bibasilar opacities suggestive of edema. Recommend clinical   correlation for superimposed infection.    < end of copied text >      < from: Transthoracic Echocardiogram (05.24.19 @ 11:45) >    Patient name: Christian Villalobos  YOB: 1928   Age: 89 (M)   MR#: [id number]  Study Date: 5/24/2019  [facility]  Study quality: Technically good  Referring Physician: Angel Land MD  Blood Pressure: 143/83 mmHg  Height: 163 cm  Weight: 64 kg  BSA: 1.7 m2  ------------------------------------------------------------------------  PROCEDURE: Transthoracic echocardiogram with 2-D, M-Mode  and complete spectral and color flow Doppler.  INDICATION: Chest pain, unspecified (R07.9)  ------------------------------------------------------------------------  DIMENSIONS:  Dimensions:     Normal Values:  LA:     3.9 cm    2.0 - 4.0 cm  Ao:     3.2 cm    2.0 - 3.8 cm  SEPTUM: 0.7 cm    0.6 - 1.2 cm  PWT:    0.7 cm    0.6 - 1.1 cm  LVIDd:  5.5 cm    3.0 - 5.6 cm  LVIDs:  3.9 cm    1.8 - 4.0 cm  Derived Variables:  LVMI: 81 g/m2  RWT: 0.25  Fractional short: 29 %  Ejection Fraction (Teicholtz): 55 %  ------------------------------------------------------------------------  OBSERVATIONS:  Mitral Valve: Mitral annular calcification, otherwise  normal mitral valve. Mild-moderate mitral regurgitation.  Aortic Root: Normal aortic root.  Aortic Valve: Calcified trileaflet aortic valve with normal  opening. Minimal aortic regurgitation.  Left Atrium: Mildly dilated left atrium.  LA volume index =  40 cc/m2.  Left Ventricle: Endocardium not well visualized; grossly  normal left ventricular systolic function. Normal left  ventricular internal dimensions and wall thicknesses.  Right Heart: Normal right atrium. Normal right ventricular  size and function. Normal tricuspid valve. Minimal  tricuspid regurgitation. Normal pulmonic valve. Minimal  pulmonic regurgitation.  Pericardium/PleuraNormal pericardium with no pericardial  effusion.  ------------------------------------------------------------------------  CONCLUSIONS:  1. Mitral annular calcification, otherwise normal mitral  valve. Mild-moderate mitral regurgitation.  2. Calcified trileaflet aortic valve with normal opening.  3. Mildly dilated left atrium.  LA volume index = 40 cc/m2.  4. Normal left ventricular internal dimensions and wall  thicknesses.  5. Endocardium not well visualized; grossly normal left  ventricular systolic function.  6. Normal right ventricular size and function.  ------------------------------------------------------------------------  Confirmed on  5/24/2019 - 13:32:55 by Elpidio Kennedy M.D. RPVI  ------------------------------------------------------------------------    < end of copied text >

## 2019-08-22 NOTE — CONSULT NOTE ADULT - ASSESSMENT
PATIENT  CORDELL FIORE   861  5/10/1928 [address] GAL[address]O DIAS                          Patient description                          89 m PMH Hyptn Hypothyroidism former 1 ppd smoker wife smoked too () Not on Home O2 or cpap HO mild hemoptysis x 3w seen by his Pulm HO falls Xarelto dced 1 w No FOB No CT admitted with worsening sob bl le edema     home meds protonix 40 metoprolol 25.2 synthroid 75 xarelto 20 asa 81   (Xarelto and asa held x 1 w0                                            Hospital course        patient was admitted as possible HFPEF exacerbation and mild hemoptysis          PATIENT CORDELL FIORE   861  5/10/1928 [address] GAL[address] DIAS                              PROBLEM/PATIENT DATA /ASSESSMENT        DYSPNEA   RO VTE RO Infection RO CHF RO COPD    RO VTE   FORMER SMOKER   COPD               RO RESP TRACT INFECTION  2019 afeb   2019 W 9.3  2019 pc n   2019 LA 2.7-1.6   MILD HEMOPTYSIS   CHF   2019 echo Mod MR ef 55%  2019 BNP 9281    2019 CXR perihilar and basal opac suggestive of edema cm Loop recorder   Lasix 40 (2019)   CAD   2019 tr1  n   Metoprolol 50 (2019)   RECURRENT FALLS   Fell May fx 3 ribs                                PATIENT  CORDELL FIORE   861  5/10/1928 [address] GAL[address] DIAS                             PULMONARY RECOMMENDATIONS        DYSPNEA   RO VTE 2019 check V duplex   FORMER SMOKER   COPD Duoneb added ()  RO RESP TRACT INFECTION Bacterial infection unlikely Pc is low   MILD HEMOPTYSIS May have been sec to acute bronchitis    Will check v duplex top exclude dvt and will; consider getting cta chest   CHF 2019 echo Mod MR ef 55% Likely has HFPEF AOC  Agree with lasix    RECURRENT FALLS                         TIME SPENT Over 55 minutes aggregate care time spent on encounter; activities included   direct pt care, counseling and/or coordinating care reviewing notes, lab data/ imaging , discussion with multidisciplinary team/ pt /family. Risks, benefits, alternatives  discussed in deta

## 2019-08-22 NOTE — H&P ADULT - NSHPOUTPATIENTPROVIDERS_GEN_ALL_CORE
Shea Mendoza (MD)  Cardiac Electrophysiology PCP - Dr. Sb Quinn  Cardio - Dr. P[facility] ([facility])  Pulm - Shea Escamilla (MD)  Cardiac Electrophysiology PCP - Dr. Sb Quinn  Cardio - Dr. Pringle ([place])  Pulm - Shea Escamilla (MD)  Cardiac Electrophysiology

## 2019-08-22 NOTE — H&P ADULT - NSICDXPASTMEDICALHX_GEN_ALL_CORE_FT
PAST MEDICAL HISTORY:  HTN (hypertension)     Hypothyroidism     Skin cancer unknown type s/p chemo 10-15 years ago, s/p resection    Urinary retention PAST MEDICAL HISTORY:  GERD (gastroesophageal reflux disease)     HTN (hypertension)     Hypothyroidism     Non-Hodgkin's lymphoma Left back s/p resection and chemo  approx 2004    Skin cancer unknown type s/p chemo 10-15 years ago, s/p resection

## 2019-08-22 NOTE — H&P ADULT - ASSESSMENT
Admitted Acute Decompensated  Heart Failure with HTN emergency 89 yo male pmh of Afib (not on AC 2/2 hemoptysis 8/9),  HTN, Hypothyroid, Mild-Mod MR, incomp RBBB, EF 55%  (echo 5/2019), Non-hodgkin's Lymphoma (s/p resection and chemo, 2004) . Admitted Acute Decompensated  Heart Failure with HTN emergency. 89yo male PMH of Afib (not on AC 2/2 hemoptysis 8/9),  HTN, Hypothyroid, Mild-Mod MR, incomp RBBB, EF 55%  (echo 5/2019), Non-Hodgkin's Lymphoma (s/p resection and chemo, 2004), last admission [facility] 5/2019 for fall and new onset Afib, now presenting for worsening SOB for past several weeks acutely worsening last night admitted with acute pulmonary edema likely due to acute diastolic CHF in setting of hypertensive emergency.

## 2019-08-22 NOTE — H&P ADULT - PROBLEM SELECTOR PLAN 5
DVT - prophylaxis Lovenox 40d DVT prophylaxis: Lovenox 40mg sq daily    IMPROVE VTE Individual Risk Assessment          RISK                                                          Points  [  ] Previous VTE                                                3  [  ] Thrombophilia                                             2  [  ] Lower limb paralysis                                   2        (unable to hold up >15 seconds)    [  ] Current Cancer                                             2         (within 6 months)  [  ] Immobilization > 24 hrs                              1  [  ] ICU/CCU stay > 24 hours                             1  [x  ] Age > 60                                                         1    IMPROVE VTE Score: 1

## 2019-08-22 NOTE — ED PROVIDER NOTE - CLINICAL SUMMARY MEDICAL DECISION MAKING FREE TEXT BOX
pt with sob, productive cough worsening x weeks - ekg/xr/labs pt with sob, productive cough worsening x weeks - ekg/xr/labs/pulm

## 2019-08-22 NOTE — ED ADULT NURSE REASSESSMENT NOTE - NS ED NURSE REASSESS COMMENT FT1
Pt alert and oriented.  /106  Dr muniz at bedside.  Awaiting orders.  Pt to be admitted to tele.  daughter at bedside.  Pt comfortable at this time.  Ongoing nursing care and safety maintained.

## 2019-08-22 NOTE — ED ADULT NURSE NOTE - NSIMPLEMENTINTERV_GEN_ALL_ED
Implemented All Fall with Harm Risk Interventions:  [place] to call system. Call bell, personal items and telephone within reach. Instruct patient to call for assistance. Room bathroom lighting operational. Non-slip footwear when patient is off stretcher. Physically safe environment: no spills, clutter or unnecessary equipment. Stretcher in lowest position, wheels locked, appropriate side rails in place. Provide visual cue, wrist band, yellow gown, etc. Monitor gait and stability. Monitor for mental status changes and reorient to person, place, and time. Review medications for side effects contributing to fall risk. Reinforce activity limits and safety measures with patient and family. Provide visual clues: red socks.

## 2019-08-22 NOTE — H&P ADULT - NSHPPHYSICALEXAM_GEN_ALL_CORE
T(C): 36.7 (08-22-19 @ 09:53), Max: 36.7 (08-22-19 @ 09:53)  HR: 109 (08-22-19 @ 12:37) (82 - 109)  BP: 195/106 (08-22-19 @ 12:37) (174/95 - 195/106)  RR: 18 (08-22-19 @ 12:37) (18 - 19)  SpO2: 96% (08-22-19 @ 12:37) (93% - 99%)    GENERAL: patient appears well, no acute distress, NCAT  EYES: sclera clear, no exudates, PERRLA, EOMI  ENMT: oropharynx clear without erythema, no exudates,dry tongue, dry mucous membranes  LUNGS: good air entry bilaterally, Rales bl lower lobes,  no wheezing or rhonchi appreciated  HEART: S1/S2, tachycardic,, no murmurs noted, 1+ pitting bl lower extremity edema, pulses 2_ DP and radial, + orthopnea  GASTROINTESTINAL: abdomen is soft, nontender, nondistended, normoactive bowel sounds  INTEGUMENT: good skin turgor, warm, dry  MUSCULOSKELETAL: no clubbing or cyanosis, no obvious deformity  NEUROLOGIC: awake, alert, oriented x3, strength 5/5 UE, 5/5 ankle, knee, hip LE bl, no sensory deficits  PSYCHIATRIC: mood is good, affect is congruent, linear and logical thought process  HEME/LYMPH: no obvious ecchymosis or petechiae T(C): 36.7 (08-22-19 @ 09:53), Max: 36.7 (08-22-19 @ 09:53)  HR: 109 (08-22-19 @ 12:37) (82 - 109)  BP: 195/106 (08-22-19 @ 12:37) (174/95 - 195/106)  RR: 18 (08-22-19 @ 12:37) (18 - 19)  SpO2: 96% (08-22-19 @ 12:37) (93% - 99%)    GENERAL: no acute distress at rest, NC/AT  EYES: sclera clear, no exudates, PERRLA, EOMI  ENMT: oropharynx clear without erythema, no exudates, dry tongue, dry mucous membranes  LUNGS: good air entry bilaterally, Rales bl lower lobes,  no wheezing or rhonchi appreciated  HEART: S1, S2, tachycardic, no murmurs noted, 2+ pitting b/l lower extremity edema, pulses 2+ DP and radial, + orthopnea  GASTROINTESTINAL: abdomen is soft, nontender, nondistended, normoactive bowel sounds  INTEGUMENT: good skin turgor, warm, dry  MUSCULOSKELETAL: no clubbing or cyanosis, no obvious deformity  NEUROLOGIC: awake, alert, oriented x3, strength 5/5 UE, 5/5 ankle, knee, hip LE bl, no sensory deficits  PSYCHIATRIC: mood is good, affect is congruent, linear and logical thought process  HEME/LYMPH: no obvious ecchymosis or petechiae

## 2019-08-22 NOTE — ED PROVIDER NOTE - PROGRESS NOTE DETAILS
No IVF given due to CHF, no apparent sepsis sha (pulm) seen eliana pt. d/w shalini (hospitalist) he will admit pt

## 2019-08-22 NOTE — H&P ADULT - PROBLEM SELECTOR PLAN 1
- Acute volume overload with  dyspnea, orthopnea, wheezing tachycardia to 120's and HTN urgency latest /106 in ED  - Gave Labetolol 5 mg IV PUsh  STAT  - s/p Lasix 40 IV in ED, will continue Lasix 40 IV daily   - Continue home metoprolol succ ER 50 daily  - Cardiology consulted, Dr. David Lo - acute diastolic CHF in setting of hypertensive emergency likely due to gradual fluid retention from high salt intake  - Gave Labetolol 5 mg IV Push STAT and lasix 40IV x1 given in the ER  - pt symptomatically improving and BP starting to come down  - will continue Lasix 40 IV daily   - Continue home metoprolol succ ER 50 daily; will add agents if BP does not stabilize with diuresis  - Cardiology consulted, Dr. David Lo  - no sign of acute ischemia on EKG but will r/out with 3 sets of [place]  - monitor on tele  - if pt went into paroxysmal afib with RVR prior to presentation to the ER, that may have contributed to the pulmonary edema -- currently in SR  - pulm consulted by ER, f/up recs

## 2019-08-23 DIAGNOSIS — R04.2 HEMOPTYSIS: ICD-10-CM

## 2019-08-23 DIAGNOSIS — R93.6 ABNORMAL FINDINGS ON DIAGNOSTIC IMAGING OF LIMBS: ICD-10-CM

## 2019-08-23 DIAGNOSIS — I16.1 HYPERTENSIVE EMERGENCY: ICD-10-CM

## 2019-08-23 LAB
ANION GAP SERPL CALC-SCNC: 10 MMOL/L — SIGNIFICANT CHANGE UP (ref 5–17)
BUN SERPL-MCNC: 23 MG/DL — SIGNIFICANT CHANGE UP (ref 7–23)
CALCIUM SERPL-MCNC: 8.9 MG/DL — SIGNIFICANT CHANGE UP (ref 8.5–10.1)
CHLORIDE SERPL-SCNC: 102 MMOL/L — SIGNIFICANT CHANGE UP (ref 96–108)
CK SERPL-CCNC: 64 U/L — SIGNIFICANT CHANGE UP (ref 26–308)
CO2 SERPL-SCNC: 30 MMOL/L — SIGNIFICANT CHANGE UP (ref 22–31)
CREAT SERPL-MCNC: 1.2 MG/DL — SIGNIFICANT CHANGE UP (ref 0.5–1.3)
GLUCOSE SERPL-MCNC: 147 MG/DL — HIGH (ref 70–99)
HCT VFR BLD CALC: 42.4 % — SIGNIFICANT CHANGE UP (ref 39–50)
HGB BLD-MCNC: 13.4 G/DL — SIGNIFICANT CHANGE UP (ref 13–17)
MAGNESIUM SERPL-MCNC: 2 MG/DL — SIGNIFICANT CHANGE UP (ref 1.6–2.6)
MCHC RBC-ENTMCNC: 27.3 PG — SIGNIFICANT CHANGE UP (ref 27–34)
MCHC RBC-ENTMCNC: 31.6 GM/DL — LOW (ref 32–36)
MCV RBC AUTO: 86.4 FL — SIGNIFICANT CHANGE UP (ref 80–100)
NRBC # BLD: 0 /100 WBCS — SIGNIFICANT CHANGE UP (ref 0–0)
PHOSPHATE SERPL-MCNC: 3.5 MG/DL — SIGNIFICANT CHANGE UP (ref 2.5–4.5)
PLATELET # BLD AUTO: 270 K/UL — SIGNIFICANT CHANGE UP (ref 150–400)
POTASSIUM SERPL-MCNC: 3.1 MMOL/L — LOW (ref 3.5–5.3)
POTASSIUM SERPL-SCNC: 3.1 MMOL/L — LOW (ref 3.5–5.3)
RBC # BLD: 4.91 M/UL — SIGNIFICANT CHANGE UP (ref 4.2–5.8)
RBC # FLD: 14.6 % — HIGH (ref 10.3–14.5)
SODIUM SERPL-SCNC: 142 MMOL/L — SIGNIFICANT CHANGE UP (ref 135–145)
TROPONIN I SERPL-MCNC: 0.02 NG/ML — SIGNIFICANT CHANGE UP (ref 0.01–0.04)
WBC # BLD: 10.31 K/UL — SIGNIFICANT CHANGE UP (ref 3.8–10.5)
WBC # FLD AUTO: 10.31 K/UL — SIGNIFICANT CHANGE UP (ref 3.8–10.5)

## 2019-08-23 PROCEDURE — 99233 SBSQ HOSP IP/OBS HIGH 50: CPT

## 2019-08-23 RX ORDER — FUROSEMIDE 40 MG
40 TABLET ORAL
Refills: 0 | Status: DISCONTINUED | OUTPATIENT
Start: 2019-08-23 | End: 2019-08-25

## 2019-08-23 RX ORDER — FUROSEMIDE 40 MG
40 TABLET ORAL
Refills: 0 | Status: DISCONTINUED | OUTPATIENT
Start: 2019-08-23 | End: 2019-08-23

## 2019-08-23 RX ORDER — HYDRALAZINE HCL 50 MG
25 TABLET ORAL EVERY 8 HOURS
Refills: 0 | Status: DISCONTINUED | OUTPATIENT
Start: 2019-08-23 | End: 2019-08-24

## 2019-08-23 RX ORDER — POTASSIUM CHLORIDE 20 MEQ
40 PACKET (EA) ORAL ONCE
Refills: 0 | Status: DISCONTINUED | OUTPATIENT
Start: 2019-08-23 | End: 2019-08-23

## 2019-08-23 RX ORDER — ISOSORBIDE MONONITRATE 60 MG/1
30 TABLET, EXTENDED RELEASE ORAL DAILY
Refills: 0 | Status: DISCONTINUED | OUTPATIENT
Start: 2019-08-23 | End: 2019-08-23

## 2019-08-23 RX ORDER — POTASSIUM CHLORIDE 20 MEQ
40 PACKET (EA) ORAL EVERY 4 HOURS
Refills: 0 | Status: COMPLETED | OUTPATIENT
Start: 2019-08-23 | End: 2019-08-23

## 2019-08-23 RX ADMIN — Medication 3 MILLILITER(S): at 18:45

## 2019-08-23 RX ADMIN — PANTOPRAZOLE SODIUM 40 MILLIGRAM(S): 20 TABLET, DELAYED RELEASE ORAL at 06:33

## 2019-08-23 RX ADMIN — Medication 40 MILLIGRAM(S): at 06:33

## 2019-08-23 RX ADMIN — Medication 50 MILLIGRAM(S): at 06:33

## 2019-08-23 RX ADMIN — Medication 40 MILLIEQUIVALENT(S): at 14:16

## 2019-08-23 RX ADMIN — Medication 3 MILLILITER(S): at 06:59

## 2019-08-23 RX ADMIN — Medication 25 MILLIGRAM(S): at 12:34

## 2019-08-23 RX ADMIN — ENOXAPARIN SODIUM 40 MILLIGRAM(S): 100 INJECTION SUBCUTANEOUS at 12:42

## 2019-08-23 RX ADMIN — Medication 25 MILLIGRAM(S): at 22:51

## 2019-08-23 RX ADMIN — Medication 3 MILLILITER(S): at 13:08

## 2019-08-23 RX ADMIN — Medication 40 MILLIEQUIVALENT(S): at 18:03

## 2019-08-23 RX ADMIN — Medication 75 MICROGRAM(S): at 06:33

## 2019-08-23 RX ADMIN — Medication 40 MILLIGRAM(S): at 18:03

## 2019-08-23 NOTE — PROGRESS NOTE ADULT - ATTENDING COMMENTS
I personally conducted a physical examination of the patient. I personally gathered the patient's history. I edited the above listed findings which were prepared by the listed resident physician. I personally discussed the plan of care with the patient. The questions and concerns were addressed to the best of my ability. The patient is in agreement with the listed treatment plan.     - hypertensive urgency. chf exacerbation. iv diuresis. attempting to optimize medical regimen. still volume overloaded today.

## 2019-08-23 NOTE — PHARMACOTHERAPY INTERVENTION NOTE - COMMENTS
Discussed patient's hospital medications. Provided patient with information on heart failure and spoke about weighing himself daily and when and why he should contact his doctor.

## 2019-08-23 NOTE — PROGRESS NOTE ADULT - PROBLEM SELECTOR PLAN 4
-Continue home pantoprazole PO daily -LE doppler showed incidental 1.5 x 0.8 x 0.7 cm cystic structure in the left groin, without internal Doppler vascularity, of uncertain etiology. Abscess or resolving   hematoma unlikely but not excluded.  -Patient can follow up outpatient

## 2019-08-23 NOTE — DIETITIAN INITIAL EVALUATION ADULT. - ENERGY NEEDS
Wt: 145 pounds, Ht: 63 inches, BMI: 25.7 kg/m2, IBW: 124 pounds  +1 B/L leg edema, no pressure injuries

## 2019-08-23 NOTE — PROGRESS NOTE ADULT - ASSESSMENT
89yo male PMH of Afib (not on AC 2/2 hemoptysis 8/9),  HTN, Hypothyroid, Mild-Mod MR, incomp RBBB, EF 55%  (echo 5/2019), Non-Hodgkin's Lymphoma (s/p resection and chemo, 2004), last admission [facility] 5/2019 for fall and new onset Afib, now presenting for worsening SOB for past several weeks acutely worsening last night admitted with acute pulmonary edema likely due to acute diastolic CHF in setting of hypertensive emergency.

## 2019-08-23 NOTE — PROGRESS NOTE ADULT - ASSESSMENT
89 yo male PMHx of Afib (not on AC 2/2 hemoptysis 8/9), HTN, Hypothyroid, Mild-Mod MR, incomplete RBBB, CHF pEF 55% (echo 5/2019), Nonhodgkin's Lymphoma (s/p resection and chemo, 2004) presents for SOB for past several days acutely worsening last night. Associated with wheezing. Admitted for acute decompensated HF and hypertensive emergency.     Acute decompensated HF   -Pt still with evidence of volume overload on clinical examination --   -pro-BNP 9281  -CXR shows bibasilar and perihilar opacities suggestive of edema    -Lasix 40mg IV Qday  - Please continue to maintain strict I/Os, monitor daily weights, Cr, and K.    -EKG: Sinus tachy, PACs,  RBBB, unchanged from previous  -Previous ECHO on 5/2019 shows EF 55%, mild-mod mitral valvular disease noted    Hypertensive emergency   -Improved but still elevated 's  -Continue home Metoprolol 50mg PO qd  - Started hydralazine 25 mg po Three times daily with hold parameters can titrate up as SBP tolerates  -Will start Imdur 30 mg daily with hold parameters to assist in unloading   -Monitor routine hemodynamics  -Monitor and replete lytes, keep K>4, Mg>2    - Doubt ACS event. Raymond neg    -Other cardiovascular workup will depend on clinical course.  -All other workup per primary team  -Will follow   Manuel Torres [facility]  Cardiology   Spectra #[phone] 89 yo male PMHx of Afib (not on AC 2/2 hemoptysis 8/9), HTN, Hypothyroid, Mild-Mod MR, incomplete RBBB, CHFpEF 55% (echo 5/2019), Nonhodgkin's Lymphoma (s/p resection and chemo, 2004) presents for SOB for past several days acutely worsening last night. Associated with wheezing. Admitted for acute decompensated HF and hypertensive emergency.     Acute decompensated HF   - Pt still with evidence of volume overload on clinical examination  - pro-BNP 9281  - CXR shows bibasilar and perihilar opacities suggestive of edema    - Increase Lasix 40 IV bid  - Please continue to maintain strict I/Os, monitor daily weights, Cr, and K.    - EKG: Sinus tachy, PACs,  RBBB, unchanged from previous  - Previous ECHO on 5/2019 shows EF 55%, mild-mod mitral valvular disease noted    Hypertensive emergency   - Improved but still elevated 's  - Continue home Metoprolol 50mg PO qd  - Started hydralazine 25 mg po Three times daily with hold parameters can titrate up as SBP tolerates  - Will start Imdur 30 mg daily with hold parameters to assist in unloading   - Monitor routine hemodynamics  - Monitor and replete lytes, keep K>4, Mg>2    - Doubt ACS event. Raymond neg    -Other cardiovascular workup will depend on clinical course.  -All other workup per primary team  -Will follow     Manuel Torres DNP  Cardiology   Spectra #[phone] 89 yo male PMHx of Afib (not on AC 2/2 hemoptysis 8/9), HTN, Hypothyroid, Mild-Mod MR, incomplete RBBB, CHFpEF 55% (echo 5/2019), Nonhodgkin's Lymphoma (s/p resection and chemo, 2004) presents for SOB for past several days acutely worsening last night. Associated with wheezing. Admitted for acute decompensated HF and hypertensive emergency.     Acute decompensated HF   - Pt still with evidence of volume overload on clinical examination  - pro-BNP 9281  - CXR shows bibasilar and perihilar opacities suggestive of edema    - Increase Lasix 40 IV bid  - Please continue to maintain strict I/Os, monitor daily weights, Cr, and K.    - EKG: Sinus tachy, PACs,  RBBB, unchanged from previous  - Previous ECHO on 5/2019 shows EF 55%, mild-mod mitral valvular disease noted    Hypertensive emergency   - Improved but still elevated 's  - Continue home Metoprolol 50mg PO qd  - Started hydralazine 25 mg po Three times daily with hold parameters can titrate up as SBP tolerates  - If bp remains high, up titrate hydralazine and add isordil 10 tid  - Monitor routine hemodynamics  - Monitor and replete lytes, keep K>4, Mg>2    - Doubt ACS event. Raymond neg    -Other cardiovascular workup will depend on clinical course.  -All other workup per primary team  -Will follow     Manuel Torres DNP  Cardiology   Spectra #[phone]

## 2019-08-23 NOTE — GOALS OF CARE CONVERSATION - PERSONAL ADVANCE DIRECTIVE - CONVERSATION DETAILS
met pt, unsure if he has hcp, gave form if unable to locate hcp, educated on same. PC RN contact # given

## 2019-08-23 NOTE — PROGRESS NOTE ADULT - PROBLEM SELECTOR PLAN 1
- Acute diastolic CHF in setting of hypertensive emergency likely due to gradual fluid retention from high salt intake vs paroxysmal Afib  - BP in the ED was 195/106  - Labetolol 5 mg IV Push STAT and lasix 40mg IV x1 given in the ED  - BP improving with SBP now in the 170s  - Will increase Lasix from 40 IV daily to 40 IV BID as patient is still fluid overloaded  - Continue home metoprolol succ ER 50 daily  - Start hydralazine 25 mg PO TID with hold parameters and will titrate up as needed  - EKG: Sinus tachy, PACs,  RBBB  - Previous ECHO on 5/2019 shows EF 55%, mild-mod mitral valvular disease noted  - Troponin negative x3  - Pulm Dr. Marmolejo consulted, recs appreciated  - Cardiology Dr. David Lo consulted, recs appreciated - Acute diastolic CHF in setting of hypertensive emergency likely due to gradual fluid retention from high salt intake vs paroxysmal Afib  - BP in the ED was 195/106  - Labetolol 5 mg IV Push STAT and lasix 40mg IV x1 given in the ED  - BP improving with SBP now in the 170s  - Will increase Lasix from 40 IV daily to 40 IV BID as patient is still fluid overloaded  - Continue home metoprolol succ ER 50 daily  - Start hydralazine 25 mg PO TID with hold parameters and will titrate up as needed  - EKG: Sinus tachy, PACs,  RBBB  - Previous ECHO on 5/2019 shows EF 55%, mild-mod mitral valvular disease noted  - Troponin negative x3  - Lactate elevated at 2.7 on admission, 1.6 in the AM  - proBNP 9281  - Pulm Dr. Marmolejo consulted, recs appreciated  - Cardiology Dr. David Lo consulted, recs appreciated

## 2019-08-23 NOTE — DIETITIAN INITIAL EVALUATION ADULT. - PROBLEM SELECTOR PLAN 1
- acute diastolic CHF in setting of hypertensive emergency likely due to gradual fluid retention from high salt intake  - Gave Labetolol 5 mg IV Push STAT and lasix 40IV x1 given in the ER  - pt symptomatically improving and BP starting to come down  - will continue Lasix 40 IV daily   - Continue home metoprolol succ ER 50 daily; will add agents if BP does not stabilize with diuresis  - Cardiology consulted, Dr. David Lo  - no sign of acute ischemia on EKG but will r/out with 3 sets of Raymond  - monitor on tele  - if pt went into paroxysmal afib with RVR prior to presentation to the ER, that may have contributed to the pulmonary edema -- currently in SR  - pulm consulted by ER, f/up recs

## 2019-08-23 NOTE — PROGRESS NOTE ADULT - ASSESSMENT
PATIENT  CORDELL FIORE   861  5/10/1928 [address] GALINDO DIAS                          Patient description                          89 m PMH Hyptn Hypothyroidism former 1 ppd smoker wife smoked too () Not on Home O2 or cpap HO mild hemoptysis x 3w seen by his Pulm HO falls Xarelto dced 1 w No FOB No CT admitted with worsening sob bl le edema     home meds protonix 40 metoprolol 25.2 synthroid 75 xarelto 20 asa 81   (Xarelto and asa held x 1 w0                                             PATIENT  CORDELL FIORE   861  5/10/1928 DR GALINDO DIAS                             PULMONARY RECOMMENDATIONS        DYSPNEA   Likely sec to chf   RO VTE    V duplex n   FORMER SMOKER   COPD    1 ppd smoker wife smoked too () Not on Home O2 or cpap   Duoneb added ()      RO RESP TRACT INFECTION   -2019 W 9.3-10.3  2019 pc n   Bacterial infection unlikely Pc is low   MILD HEMOPTYSIS   May have been sec to acute bronchitis      V duplex  was n   Will consider getting cta chest if hemoptysis persists  CHF   2019 echo Mod MR ef 55% Likely has HFPEF AOC    2019 CXR perihilar and basal opac suggestive of edema cm Loop recorder   Hydralazine 25.3 ()   Lasix 40.2 ()   Cont Rx Monitor lytes cr   RECURRENT FALLS    TIME SPENT Over 25 minutes aggregate care time spent on encounter; activities included   direct pt care, counseling and/or coordinating care reviewing notes, lab data/ imaging , discussion with multidisciplinary team/ pt /family. Risks, benefits, alternatives  discussed in fiona

## 2019-08-23 NOTE — DIETITIAN INITIAL EVALUATION ADULT. - PROBLEM SELECTOR PLAN 5
DVT prophylaxis: Lovenox 40mg sq daily    IMPROVE VTE Individual Risk Assessment          RISK                                                          Points  [  ] Previous VTE                                                3  [  ] Thrombophilia                                             2  [  ] Lower limb paralysis                                   2        (unable to hold up >15 seconds)    [  ] Current Cancer                                             2         (within 6 months)  [  ] Immobilization > 24 hrs                              1  [  ] ICU/CCU stay > 24 hours                             1  [x  ] Age > 60                                                         1    IMPROVE VTE Score: 1

## 2019-08-23 NOTE — PROGRESS NOTE ADULT - REASON FOR ADMISSION
Acute Decompensated  Heart Failure with HTN emergency Acute Decompensated Heart Failure with HTN emergency

## 2019-08-23 NOTE — PROGRESS NOTE ADULT - SUBJECTIVE AND OBJECTIVE BOX
[facility] -- Lucia Brady, Tray, Concepcion, Wally Jackson Savella  Office # [phone]      Follow Up:    Acute decompensated HF w/ HTN emergency  Subjective/Observations:   No events overnight resting comfortably in bed.  No complaints of chest pain,  palpitations reported. No signs ofPND. + dyspnea, orthopnea     REVIEW OF SYSTEMS: All other review of systems is negative unless indicated above    PAST MEDICAL & SURGICAL HISTORY:  GERD (gastroesophageal reflux disease)  Non-Hodgkin's lymphoma: Left back s/p resection and chemo  approx 2004  Skin cancer: unknown type s/p chemo 10-15 years ago, s/p resection  HTN (hypertension)  Hypothyroidism  No significant past surgical history      MEDICATIONS  (STANDING):  ALBUTerol/ipratropium for Nebulization 3 milliLiter(s) Nebulizer every 6 hours  enoxaparin Injectable 40 milliGRAM(s) SubCutaneous daily  furosemide   Injectable 40 milliGRAM(s) IV Push daily  hydrALAZINE 25 milliGRAM(s) Oral every 8 hours  levothyroxine 75 MICROGram(s) Oral daily  metoprolol succinate ER 50 milliGRAM(s) Oral daily  pantoprazole    Tablet 40 milliGRAM(s) Oral before breakfast  potassium chloride    Tablet ER 40 milliEquivalent(s) Oral every 4 hours    MEDICATIONS  (PRN):      Allergies    No Known Allergies    Intolerances        Vital Signs Last 24 Hrs  T(C): 36.7 (23 Aug 2019 07:21), Max: 36.9 (22 Aug 2019 20:48)  T(F): 98.1 (23 Aug 2019 07:21), Max: 98.4 (22 Aug 2019 20:48)  HR: 110 (23 Aug 2019 07:27) (70 - 110)  BP: 172/98 (23 Aug 2019 07:27) (153/88 - 195/106)  BP(mean): --  RR: 19 (23 Aug 2019 07:27) (18 - 19)  SpO2: 96% (23 Aug 2019 07:27) (92% - 99%)    I&O's Summary    22 Aug 2019 07:01  -  23 Aug 2019 07:00  --------------------------------------------------------  IN: 460 mL / OUT: 2150 mL / NET: -1690 mL          PHYSICAL EXAM:  TELE: SR  Constitutional: NAD, awake and alert, well-developed  HEENT: Moist Mucous Membranes, Anicteric  Pulmonary: Non-labored, breath sounds with Bilateral expiratory wheezes throughout and diminished at bases   No  crackles or rhonchi   Cardiovascular: Regular, S1 and S2 nl, No murmurs, rubs, gallops or clicks  Gastrointestinal: Bowel Sounds present, soft, nontender.   Lymph: No lymphadenopathy. Trace  peripheral edema L>R.  Skin: No visible rashes or ulcers.  Psych:  Mood & affect appropriate    LABS: All Labs Reviewed:                        13.4   10.31 )-----------( 270      ( 23 Aug 2019 07:57 )             42.4                         12.6   9.36  )-----------( 249      ( 22 Aug 2019 10:56 )             40.1     23 Aug 2019 07:57    142    |  102    |  23     ----------------------------<  147    3.1     |  30     |  1.20   22 Aug 2019 10:56    143    |  106    |  25     ----------------------------<  136    3.5     |  30     |  1.20     Ca    8.9        23 Aug 2019 07:57  Ca    8.9        22 Aug 2019 10:56  Phos  3.5       23 Aug 2019 07:57  Mg     2.0       23 Aug 2019 07:57    TPro  7.3    /  Alb  3.7    /  TBili  0.8    /  DBili  x      /  AST  27     /  ALT  56     /  AlkPhos  90     22 Aug 2019 10:56    PT/INR - ( 22 Aug 2019 10:56 )   PT: 14.3 sec;   INR: 1.26 ratio         PTT - ( 22 Aug 2019 10:56 )  PTT:25.2 sec  CARDIAC MARKERS ( 23 Aug 2019 07:57 )  .021 ng/mL / x     / 64 U/L / x     / x      CARDIAC MARKERS ( 22 Aug 2019 16:46 )  .043 ng/mL / x     / 84 U/L / x     / 3.8 ng/mL  CARDIAC MARKERS ( 22 Aug 2019 10:56 )  .034 ng/mL / x     / 85 U/L / x     / 4.1 ng/mL         ECG:  < from: 12 Lead ECG (08.22.19 @ 10:09) >  Ventricular Rate 112 BPM    Atrial Rate 112 BPM    P-R Interval 92 ms    QRS Duration 112 ms    Q-T Interval 382 ms    QTC Calculation(Bezet) 521 ms    P Axis 33 degrees    R Axis -20 degrees    T Axis -3 degrees    Diagnosis Line Sinus tachycardiawith short MA with premature atrial complexes  Right bundle branch block  Prolonged QT  Abnormal ECG  When compared with ECG of 03-AUG-2019 13:41,  premature atrial complexes are now present  MA interval has decreased  Confirmed by LUCIA GOULD ([id number])on 8/22/2019 2:59:34 PM    < end of copied text >    Echo:    Radiology:  < from: US Duplex Venous Lower Ext Complete, Bilateral (08.22.19 @ 21:11) >  EXAM:  US DPLX LWR EXT VEINS COMPL BI                            PROCEDURE DATE:  08/22/2019          INTERPRETATION:  [facility] RADIOLOGIST PRELIMINARY REPORT    EXAM:   US Duplex Bilateral Lower Extremity Veins     EXAM DATE/TIME:   8/22/2019 8:27 PM     CLINICAL HISTORY:   89 years old, male; Other: Hemoptysis, shortness of breath and   hypertension. Evaluate for deep venous thrombosis.    TECHNIQUE:   Imaging protocol: Real-time duplex ultrasound of the Bilateral Lower   Extremities with 2-D gray scale, color Doppler flow and spectral waveform   analysis with image documentation. Complete exam focused on the bilateral   lower   extremity veins.     COMPARISON:   No relevant prior studies available.     FINDINGS:   Right deep veins: Unremarkable. The common femoral, femoral, proximal   profunda   femoral and popliteal veins are patent without thrombus. Normal Doppler   waveforms. Normal compressibility and/or augmentation response.    Right superficial veins: Saphenofemoral junction is patent without   thrombus.     Left deep veins: Unremarkable. The common femoral, femoral, proximal   profunda   femoral and popliteal veins are patent without thrombus. Normal Doppler   waveforms. Normal compressibility and/or augmentation response.    Left superficial veins: Saphenofemoral junction is patent without   thrombus.   No acute thrombus is noted in the calf veins.    Soft tissues: Incidental 1.5 x 0.8 x 0.7 cm cystic structure in the left   groin,   without internal Doppler vascularity,of uncertain etiology. Abscess or   resolving hematoma unlikely but not excluded.     IMPRESSION:   1. Incidental 1.5 x 0.8 x 0.7 cm cystic structure in the left groin,   without   internal Doppler vascularity, of uncertain etiology. Abscess or resolving   hematoma unlikely but not excluded.   2. No DVT    This study was preliminary reported by [facility].                ELIEL COATS M.D., ATTENDING RADIOLOGIST  This document has been electronically signed. Aug 23 2019  7:57AM    < end of copied text >  < from: Xray Chest 1 View AP/PA (08.22.19 @ 10:56) >  EXAM:  XR CHEST AP OR PA 1V                            PROCEDURE DATE:  08/22/2019          INTERPRETATION:  CLINICAL INFORMATION: Shortness of breath and cough.    TECHNIQUE: AP portable view of the chest    COMPARISON: 8/3/2019    FINDINGS:    The cardiac size is mildly enlarged. Aorta is tortuous and   atherosclerotic. Loop recorder again noted overlying the left chest.   There are bibasilar and perihilar opacities suggestive of edema. There   are likely small bilateral pleural effusions. There is no pneumothorax.     IMPRESSION:     Perihilar and bibasilar opacities suggestive of edema. Recommend clinical   correlation for superimposed infection.                CHRISTOPHER PRITCHETT M.D., ATTENDING RADIOLOGIST  This document has been electronically signed. Aug 22 2019 11:20AM        < end of copied text >           Manuel Torres [facility]   Cardiology

## 2019-08-23 NOTE — DIETITIAN INITIAL EVALUATION ADULT. - ADD RECOMMEND
Pt provided with written and verbal heart failure nutrition education. Topics discussed include: limiting sodium intake (limiting takeout and cold cuts, purchasing low sodium options), watching fluid intake, and monitoring weight changes (2-3 pounds overnight or 5 pounds within 1 week and communicating them to MD in case of fluid retention).

## 2019-08-23 NOTE — PROGRESS NOTE ADULT - SUBJECTIVE AND OBJECTIVE BOX
Patient was examined Chart reviewed Detailed note will be inserted below after going over latest data Meanwhile please refer to my previous notes for Pulmonary/Critical Care assessment recommendations CORDELL FIORE   861  5/10/1928 [address] GALINDO DIAS    ALLERGY      nka   CONTACT [facility]  [facility]       Initial evaluation/Pulmonary Critical Care consultation requested on 2019    by Dr Bean  from Dr Marmolejo covering Dr Madison  Patient examined chart reviewed    HOSPITAL ADMISSION   PATIENT CAME  FROM (if information available)        TYPE OF VISIT      Subsequent Pulmonary followup     REASON FOR VISIT  PLEASE SEE PROBLEM LIST/ASSESSMENTAND RECOMMENDATIONS     PATIENT CORDELL FIORE   [birth year]  5/10/1928 [address] GALINDO DIAS      VITALS/LABS    2019 afeb 109 170/98 19 98%    2019 W 10.3 Hb 13.4 Plt 270   Na 142 K 3.1 CO2 30 Cr 1.2     GLOBAL ISSUE/BEST PRACTICE:      PROBLEM: HOB elevation:   y            PROBLEM: Stress ulcer proph:    na                      PROBLEM: VTE prophylaxis:    lvnx 40 (2019)   PROBLEM: Glycemic control:    na  PROBLEM: Nutrition:   dash (20190   PROBLEM: Advanced directive: na     PROBLEM: Allergies:  na    REVIEW OF SYMPTOMS     NOTE Changess if any  in ROS and PE are updated in daily HOSPITAL COURSE below      Able to give ROS  Yes     RELIABLE No   CONSTITUTIONAL Weakness Yes  Chills No Vision changes No  ENDOCRINE No unexplained hair loss No heat or cold intolerance    ALLERGY No hives  Sore throat No   RESP Coughing blood no  Shortness of breath YES   NEURO No Headache  Confusion Pain neck No   CARDIAC No Chest pain No Palpitations   GI No Pain abdomen NO   Vomiting NO     PHYSICAL EXAM    HEENT Unremarkable PERRLA atraumatic   RESP Fair air entry EXP prolonged    Harsh breath sound Resp distres mild   CARDIAC S1 S2 No S3     NO JVD    ABDOMEN SOFT BS PRESENT NOT DISTENDED No hepatosplenomegaly PEDAL EDEMA present No calf tenderness  NO rash   GENERAL Not TOXIC

## 2019-08-23 NOTE — PROGRESS NOTE ADULT - SUBJECTIVE AND OBJECTIVE BOX
Patient is a 89y old  Male who presents with a chief complaint of Acute Decompensated  Heart Failure with HTN emergency (23 Aug 2019 10:17)      FROM ADMISSION H+P:   HPI:  89yo male PMH of Afib (not on AC 2/2 hemoptysis 8/9),  HTN, Hypothyroid, Mild-Mod MR, incomp RBBB, EF 55%  (echo 5/2019), Non-Hodgkin's Lymphoma (s/p resection and chemo, 2004), last admission [facility] 5/2019 for fall and new onset Afib, now presenting for worsening SOB for past several weeks acutely worsening last night. Associated with wheezing. He feels like he cannot breath since last night. SOB associated with cough x 2-3 weeks since a presumed viral infection that he possibly caught from his granddaughter. Symptoms are significantly exacerbated by lying down. Pt has also had worsening LE edema. Two to three weeks ago pt had sore throat and malaise which improved with lingering cough.  Of note, patient's cardiologist stopped his Xarelto (for Afib) when he had hemoptysis from his previous illness in early August. Now, his cough brings up minimal mucus, mostly dry. Pt has not had diagnosis of CHF. Pt's diet is rich in salt. Denies CP, palpitations, fevers/chills, myalgias, joint pain, recent falls, dizziness, HA, blurred vision. Improved symptomatically after the oxygen and lasix 40mg IV x1 in the ED.     VS T 98 oral  -120s on telemonitor sinus tachy, BP max 198/106  RR: 18 SPO2: 93 on RA   Labs INR 1.2 BUN 30, Lactate 2.7  EKG; Sinus Tachy, PACs Incomplete RBBB, unchanged from previous   Imaging :CXR : There are bibasilar and perihilar opacities suggestive of edema.   Received Lasix 40mg IV x1 in ED. (22 Aug 2019 12:07)    ----  INTERVAL HPI/OVERNIGHT EVENTS: Pt seen and evaluated at the bedside. No acute overnight events occurred. Patient is sitting comfortably on 2L O2 NC (does not use O2 at home) and states he is doing much better today. Patient reports his difficulty breathing was very severe prior to coming to the ED. Patient states his breathing is markedly improved since receiving lasix but still feels like he has fluid in his lungs. Patient's BPs are still elevated but patient denies headaches, chest pain, palpitations, blurry vision, dizziness.     ----  PAST MEDICAL & SURGICAL HISTORY:  GERD (gastroesophageal reflux disease)  Non-Hodgkin's lymphoma: Left back s/p resection and chemo  approx 2004  Skin cancer: unknown type s/p chemo 10-15 years ago, s/p resection  HTN (hypertension)  Hypothyroidism  No significant past surgical history    FAMILY HISTORY:  No pertinent family history in first degree relatives    Allergies  No Known Allergies    Intolerances    ----  REVIEW OF SYSTEMS:  CONSTITUTIONAL: admits weakness, denies fever, chills  HEENT: denies blurred vision, sore throat  SKIN: denies new lesions, new rash  CARDIOVASCULAR: denies chest pain, chest pressure, palpitations  RESPIRATORY: reports mild SOB but improved since yesterday, mild cough with minimal sputum production  GASTROINTESTINAL: denies nausea, vomiting, diarrhea, abdominal pain  GENITOURINARY: denies dysuria, discharge  NEUROLOGICAL: denies numbness, headache, focal weakness  MUSCULOSKELETAL: denies new joint pain, muscle aches  HEMATOLOGIC: denies gross bleeding, new bruising    ----  PHYSICAL EXAM:  GENERAL: patient appears well, no acute distress, appropriately interactive  EYES: sclera clear, no exudates  NECK: supple, soft, no thyromegaly noted  LUNGS: + rales B/L lower lobes, symmetric breath sounds, no wheezing or rhonchi appreciated  HEART: soft S1/S2, regular rate and rhythm, no murmurs noted, 1+ pitting edema in LE  GASTROINTESTINAL: abdomen is soft, nontender, nondistended, normoactive bowel sounds, no palpable masses  INTEGUMENT: good skin turgor, appropriate for ethnicity, appears well perfused, no jaundice noted  MUSCULOSKELETAL: no clubbing or cyanosis, no obvious deformity  NEUROLOGIC: awake, alert, oriented x3, good muscle tone in 4 extremities, no obvious sensory deficits      T(C): 36.6 (08-23-19 @ 11:42), Max: 36.9 (08-22-19 @ 20:48)  HR: 83 (08-23-19 @ 13:45) (70 - 111)  BP: 160/78 (08-23-19 @ 13:45) (153/88 - 180/80)  RR: 18 (08-23-19 @ 13:45) (17 - 19)  SpO2: 96% (08-23-19 @ 13:45) (92% - 99%)  Wt(kg): --    ----  I&O's Summary    22 Aug 2019 07:01  -  23 Aug 2019 07:00  --------------------------------------------------------  IN: 460 mL / OUT: 2150 mL / NET: -1690 mL    23 Aug 2019 07:01  -  23 Aug 2019 14:11  --------------------------------------------------------  IN: 250 mL / OUT: 200 mL / NET: 50 mL        LABS:                        13.4   10.31 )-----------( 270      ( 23 Aug 2019 07:57 )             42.4     08-23    142  |  102  |  23  ----------------------------<  147<H>  3.1<L>   |  30  |  1.20    Ca    8.9      23 Aug 2019 07:57  Phos  3.5     08-23  Mg     2.0     08-23    TPro  7.3  /  Alb  3.7  /  TBili  0.8  /  DBili  x   /  AST  27  /  ALT  56  /  AlkPhos  90  08-22    PT/INR - ( 22 Aug 2019 10:56 )   PT: 14.3 sec;   INR: 1.26 ratio      PTT - ( 22 Aug 2019 10:56 )  PTT:25.2 sec    CAPILLARY BLOOD GLUCOSE  POCT Blood Glucose.: 130 mg/dL (22 Aug 2019 17:11)      ----  Personally reviewed:  Vital sign trends: [ x ] yes    [  ] no     [  ] n/a  Laboratory results: [ x ] yes    [  ] no     [  ] n/a  Radiology results: [ x ] yes    [  ] no     [  ] n/a  Culture results: [  ] yes    [  ] no     [ x ] n/a  Consultant recommendations: [ x ] yes    [  ] no     [  ] n/a Patient is a 89y old  Male who presents with a chief complaint of Acute Decompensated  Heart Failure with HTN emergency (23 Aug 2019 10:17)      FROM ADMISSION H+P:   HPI:  89yo male PMH of Afib (not on AC 2/2 hemoptysis 8/9),  HTN, Hypothyroid, Mild-Mod MR, incomp RBBB, EF 55%  (echo 5/2019), Non-Hodgkin's Lymphoma (s/p resection and chemo, 2004), last admission [facility] 5/2019 for fall and new onset Afib, now presenting for worsening SOB for past several weeks acutely worsening last night. Associated with wheezing. He feels like he cannot breath since last night. SOB associated with cough x 2-3 weeks since a presumed viral infection that he possibly caught from his granddaughter. Symptoms are significantly exacerbated by lying down. Pt has also had worsening LE edema. Two to three weeks ago pt had sore throat and malaise which improved with lingering cough.  Of note, patient's cardiologist stopped his Xarelto (for Afib) when he had hemoptysis from his previous illness in early August. Now, his cough brings up minimal mucus, mostly dry. Pt has not had diagnosis of CHF. Pt's diet is rich in salt. Denies CP, palpitations, fevers/chills, myalgias, joint pain, recent falls, dizziness, HA, blurred vision. Improved symptomatically after the oxygen and lasix 40mg IV x1 in the ED.     VS T 98 oral  -120s on telemonitor sinus tachy, BP max 198/106  RR: 18 SPO2: 93 on RA   Labs INR 1.2 BUN 30, Lactate 2.7  EKG; Sinus Tachy, PACs Incomplete RBBB, unchanged from previous   Imaging :CXR : There are bibasilar and perihilar opacities suggestive of edema.   Received Lasix 40mg IV x1 in ED. (22 Aug 2019 12:07)    ----  INTERVAL HPI/OVERNIGHT EVENTS: Pt seen and evaluated at the bedside. No acute overnight events occurred. Patient is sitting comfortably on 2L O2 NC (does not use O2 at home) and states he is doing much better today. Patient reports his difficulty breathing was very severe prior to coming to the ED. Patient states his breathing is markedly improved since receiving lasix but still feels like he has fluid in his lungs. Patient's BPs are still elevated but patient denies headaches, chest pain, palpitations, blurry vision, dizziness, hemoptysis    ----  PAST MEDICAL & SURGICAL HISTORY:  GERD (gastroesophageal reflux disease)  Non-Hodgkin's lymphoma: Left back s/p resection and chemo  approx 2004  Skin cancer: unknown type s/p chemo 10-15 years ago, s/p resection  HTN (hypertension)  Hypothyroidism  No significant past surgical history    FAMILY HISTORY:  No pertinent family history in first degree relatives    Allergies  No Known Allergies    Intolerances    ----  REVIEW OF SYSTEMS:  CONSTITUTIONAL: admits weakness, denies fever, chills  HEENT: denies blurred vision, sore throat  SKIN: denies new lesions, new rash  CARDIOVASCULAR: denies chest pain, chest pressure, palpitations  RESPIRATORY: reports mild SOB but improved since yesterday, mild cough with minimal sputum production  GASTROINTESTINAL: denies nausea, vomiting, diarrhea, abdominal pain  GENITOURINARY: denies dysuria, discharge  NEUROLOGICAL: denies numbness, headache, focal weakness  MUSCULOSKELETAL: denies new joint pain, muscle aches  HEMATOLOGIC: denies gross bleeding, new bruising    ----  PHYSICAL EXAM:  GENERAL: patient appears well, no acute distress, appropriately interactive  EYES: sclera clear, no exudates  NECK: supple, soft, no thyromegaly noted  LUNGS: + rales B/L lower lobes, symmetric breath sounds, no wheezing or rhonchi appreciated  HEART: soft S1/S2, regular rate and rhythm, no murmurs noted, 1+ pitting edema in LE  GASTROINTESTINAL: abdomen is soft, nontender, nondistended, normoactive bowel sounds, no palpable masses  INTEGUMENT: good skin turgor, appropriate for ethnicity, appears well perfused, no jaundice noted  MUSCULOSKELETAL: no clubbing or cyanosis, no obvious deformity  NEUROLOGIC: awake, alert, oriented x3, good muscle tone in 4 extremities, no obvious sensory deficits      T(C): 36.6 (08-23-19 @ 11:42), Max: 36.9 (08-22-19 @ 20:48)  HR: 83 (08-23-19 @ 13:45) (70 - 111)  BP: 160/78 (08-23-19 @ 13:45) (153/88 - 180/80)  RR: 18 (08-23-19 @ 13:45) (17 - 19)  SpO2: 96% (08-23-19 @ 13:45) (92% - 99%)  Wt(kg): --    ----  I&O's Summary    22 Aug 2019 07:01  -  23 Aug 2019 07:00  --------------------------------------------------------  IN: 460 mL / OUT: 2150 mL / NET: -1690 mL    23 Aug 2019 07:01  -  23 Aug 2019 14:11  --------------------------------------------------------  IN: 250 mL / OUT: 200 mL / NET: 50 mL        LABS:                        13.4   10.31 )-----------( 270      ( 23 Aug 2019 07:57 )             42.4     08-23    142  |  102  |  23  ----------------------------<  147<H>  3.1<L>   |  30  |  1.20    Ca    8.9      23 Aug 2019 07:57  Phos  3.5     08-23  Mg     2.0     08-23    TPro  7.3  /  Alb  3.7  /  TBili  0.8  /  DBili  x   /  AST  27  /  ALT  56  /  AlkPhos  90  08-22    PT/INR - ( 22 Aug 2019 10:56 )   PT: 14.3 sec;   INR: 1.26 ratio      PTT - ( 22 Aug 2019 10:56 )  PTT:25.2 sec    CAPILLARY BLOOD GLUCOSE  POCT Blood Glucose.: 130 mg/dL (22 Aug 2019 17:11)      ----  Personally reviewed:  Vital sign trends: [ x ] yes    [  ] no     [  ] n/a  Laboratory results: [ x ] yes    [  ] no     [  ] n/a  Radiology results: [ x ] yes    [  ] no     [  ] n/a  Culture results: [  ] yes    [  ] no     [ x ] n/a  Consultant recommendations: [ x ] yes    [  ] no     [  ] n/a Patient is a 89y old  Male who presents with a chief complaint of Acute Decompensated  Heart Failure with HTN emergency (23 Aug 2019 10:17)      FROM ADMISSION H+P:   HPI:  89yo male PMH of Afib (not on AC 2/2 hemoptysis 8/9),  HTN, Hypothyroid, Mild-Mod MR, incomp RBBB, EF 55%  (echo 5/2019), Non-Hodgkin's Lymphoma (s/p resection and chemo, 2004), last admission [facility] 5/2019 for fall and new onset Afib, now presenting for worsening SOB for past several weeks acutely worsening last night. Associated with wheezing. He feels like he cannot breath since last night. SOB associated with cough x 2-3 weeks since a presumed viral infection that he possibly caught from his granddaughter. Symptoms are significantly exacerbated by lying down. Pt has also had worsening LE edema. Two to three weeks ago pt had sore throat and malaise which improved with lingering cough.  Of note, patient's cardiologist stopped his Xarelto (for Afib) when he had hemoptysis from his previous illness in early August. Now, his cough brings up minimal mucus, mostly dry. Pt has not had diagnosis of CHF. Pt's diet is rich in salt. Denies CP, palpitations, fevers/chills, myalgias, joint pain, recent falls, dizziness, HA, blurred vision. Improved symptomatically after the oxygen and lasix 40mg IV x1 in the ED.     VS T 98 oral  -120s on telemonitor sinus tachy, BP max 198/106  RR: 18 SPO2: 93 on RA   Labs INR 1.2 BUN 30, Lactate 2.7  EKG; Sinus Tachy, PACs Incomplete RBBB, unchanged from previous   Imaging :CXR : There are bibasilar and perihilar opacities suggestive of edema.   Received Lasix 40mg IV x1 in ED. (22 Aug 2019 12:07)    ----  INTERVAL HPI/OVERNIGHT EVENTS: Pt seen and evaluated at the bedside. No acute overnight events occurred. Patient is sitting comfortably on 2L O2 NC (does not use O2 at home) and states he is doing much better today. Patient reports his difficulty breathing was very severe prior to coming to the ED. Patient states his breathing is markedly improved since receiving lasix but still feels like he has fluid in his lungs. Patient's BPs are still elevated but patient denies headaches, chest pain, palpitations, blurry vision, dizziness, hemoptysis    ----  PAST MEDICAL & SURGICAL HISTORY:  GERD (gastroesophageal reflux disease)  Non-Hodgkin's lymphoma: Left back s/p resection and chemo  approx 2004  Skin cancer: unknown type s/p chemo 10-15 years ago, s/p resection  HTN (hypertension)  Hypothyroidism  No significant past surgical history    FAMILY HISTORY:  No pertinent family history in first degree relatives    Allergies  No Known Allergies    Intolerances    ----  REVIEW OF SYSTEMS:  CONSTITUTIONAL: admits weakness, denies fever, chills  HEENT: denies blurred vision, sore throat  SKIN: denies new lesions, new rash  CARDIOVASCULAR: denies chest pain, chest pressure, palpitations  RESPIRATORY: reports mild SOB but improved since yesterday, mild cough with minimal sputum production  GASTROINTESTINAL: denies nausea, vomiting, diarrhea, abdominal pain  GENITOURINARY: denies dysuria, discharge  NEUROLOGICAL: denies numbness, headache, focal weakness  MUSCULOSKELETAL: denies new joint pain, muscle aches  HEMATOLOGIC: denies gross bleeding, new bruising    ----  PHYSICAL EXAM:  GENERAL: patient appears well, no acute distress, appropriately interactive  EYES: sclera clear, no exudates  NECK: supple, soft, no thyromegaly noted  LUNGS: obvious rales B/L lower lobes, symmetric breath sounds, no wheezing or rhonchi appreciated  HEART: soft S1/S2, regular rate and rhythm, no murmurs noted, 1+ pitting edema in LE  GASTROINTESTINAL: abdomen is soft, nontender, nondistended, normoactive bowel sounds, no palpable masses  INTEGUMENT: good skin turgor, appropriate for ethnicity, appears well perfused, no jaundice noted  MUSCULOSKELETAL: no clubbing or cyanosis, no obvious deformity  NEUROLOGIC: awake, alert, oriented x3, good muscle tone in 4 extremities, no obvious sensory deficits      T(C): 36.6 (08-23-19 @ 11:42), Max: 36.9 (08-22-19 @ 20:48)  HR: 83 (08-23-19 @ 13:45) (70 - 111)  BP: 160/78 (08-23-19 @ 13:45) (153/88 - 180/80)  RR: 18 (08-23-19 @ 13:45) (17 - 19)  SpO2: 96% (08-23-19 @ 13:45) (92% - 99%)  Wt(kg): --    ----  I&O's Summary    22 Aug 2019 07:01  -  23 Aug 2019 07:00  --------------------------------------------------------  IN: 460 mL / OUT: 2150 mL / NET: -1690 mL    23 Aug 2019 07:01  -  23 Aug 2019 14:11  --------------------------------------------------------  IN: 250 mL / OUT: 200 mL / NET: 50 mL        LABS:                        13.4   10.31 )-----------( 270      ( 23 Aug 2019 07:57 )             42.4     08-23    142  |  102  |  23  ----------------------------<  147<H>  3.1<L>   |  30  |  1.20    Ca    8.9      23 Aug 2019 07:57  Phos  3.5     08-23  Mg     2.0     08-23    TPro  7.3  /  Alb  3.7  /  TBili  0.8  /  DBili  x   /  AST  27  /  ALT  56  /  AlkPhos  90  08-22    PT/INR - ( 22 Aug 2019 10:56 )   PT: 14.3 sec;   INR: 1.26 ratio      PTT - ( 22 Aug 2019 10:56 )  PTT:25.2 sec    CAPILLARY BLOOD GLUCOSE  POCT Blood Glucose.: 130 mg/dL (22 Aug 2019 17:11)      ----  Personally reviewed:  Vital sign trends: [ x ] yes    [  ] no     [  ] n/a  Laboratory results: [ x ] yes    [  ] no     [  ] n/a  Radiology results: [ x ] yes    [  ] no     [  ] n/a  Culture results: [  ] yes    [  ] no     [ x ] n/a  Consultant recommendations: [ x ] yes    [  ] no     [  ] n/a

## 2019-08-23 NOTE — PROGRESS NOTE ADULT - PROBLEM SELECTOR PLAN 3
-Continue home levothyroxine 75 mcg daily  -TSH 2.08 WNL -Likely 2/2 to bronchitis, resolving  -Venous doppler on 8/22 showed no evidence of DVT  -Will consider getting CTA if hemoptysis persists

## 2019-08-23 NOTE — DIETITIAN INITIAL EVALUATION ADULT. - OTHER INFO
Pt seen for nutrition assessment for HF. Per chart, pt is 89 Y/O M with PMH of Afib, HTN, hypothyroid, Non-Hodgkins Lymphoma (S/P chemo and resection 2004). Appears to have newly diagnosed HF on this admission. Patient states UBW is 145 pounds and has been stable yet admits to not weighing himself often. Denied N/V/diarrhea/constipation, last BM today. Denies difficulties swallowing. Has upper dentures, states that he needs lower dentures but hasn't gotten them because they're too expensive. Pt endorses good appetite/intake, diet recall is high in sodium as is consistent with H&P. Breakfast - cold cereal with milk, lunch - vegetable soup, roast beef sandwich and dinner - varies, can be takeout (loves Chinese food) or whatever his daughter cooks. Patient with food knowledge deficit regarding heart failure nutrition therapy. NKFA. No vitamin/mineral supplementation.

## 2019-08-23 NOTE — PROVIDER CONTACT NOTE (OTHER) - ASSESSMENT
vital signs stable, no c/o chest pain or sob vital signs stable, no c/o chest pain or sob and palpitations

## 2019-08-23 NOTE — PROGRESS NOTE ADULT - PROBLEM SELECTOR PLAN 5
DVT prophylaxis: Lovenox 40mg sq daily    IMPROVE VTE Individual Risk Assessment          RISK                                                          Points  [  ] Previous VTE                                                3  [  ] Thrombophilia                                             2  [  ] Lower limb paralysis                                   2        (unable to hold up >15 seconds)    [  ] Current Cancer                                             2         (within 6 months)  [  ] Immobilization > 24 hrs                              1  [  ] ICU/CCU stay > 24 hours                             1  [x  ] Age > 60                                                         1    IMPROVE VTE Score: 1 -Continue home levothyroxine 75 mcg daily  -TSH 2.08 WNL

## 2019-08-23 NOTE — PROGRESS NOTE ADULT - ATTENDING COMMENTS
Seen/examined. agree with above.   increase iv lasix.  hydralazine for bp control and afterload reduction

## 2019-08-24 LAB
ANION GAP SERPL CALC-SCNC: 8 MMOL/L — SIGNIFICANT CHANGE UP (ref 5–17)
BASOPHILS # BLD AUTO: 0.1 K/UL — SIGNIFICANT CHANGE UP (ref 0–0.2)
BASOPHILS NFR BLD AUTO: 1.1 % — SIGNIFICANT CHANGE UP (ref 0–2)
BUN SERPL-MCNC: 23 MG/DL — SIGNIFICANT CHANGE UP (ref 7–23)
CALCIUM SERPL-MCNC: 8.6 MG/DL — SIGNIFICANT CHANGE UP (ref 8.5–10.1)
CHLORIDE SERPL-SCNC: 102 MMOL/L — SIGNIFICANT CHANGE UP (ref 96–108)
CO2 SERPL-SCNC: 36 MMOL/L — HIGH (ref 22–31)
CREAT SERPL-MCNC: 1.2 MG/DL — SIGNIFICANT CHANGE UP (ref 0.5–1.3)
EOSINOPHIL # BLD AUTO: 0.17 K/UL — SIGNIFICANT CHANGE UP (ref 0–0.5)
EOSINOPHIL NFR BLD AUTO: 1.8 % — SIGNIFICANT CHANGE UP (ref 0–6)
GLUCOSE SERPL-MCNC: 105 MG/DL — HIGH (ref 70–99)
HBA1C BLD-MCNC: 6 % — HIGH (ref 4–5.6)
HCT VFR BLD CALC: 41.7 % — SIGNIFICANT CHANGE UP (ref 39–50)
HGB BLD-MCNC: 13 G/DL — SIGNIFICANT CHANGE UP (ref 13–17)
IMM GRANULOCYTES NFR BLD AUTO: 0.3 % — SIGNIFICANT CHANGE UP (ref 0–1.5)
LYMPHOCYTES # BLD AUTO: 1.82 K/UL — SIGNIFICANT CHANGE UP (ref 1–3.3)
LYMPHOCYTES # BLD AUTO: 19.2 % — SIGNIFICANT CHANGE UP (ref 13–44)
MCHC RBC-ENTMCNC: 27.4 PG — SIGNIFICANT CHANGE UP (ref 27–34)
MCHC RBC-ENTMCNC: 31.2 GM/DL — LOW (ref 32–36)
MCV RBC AUTO: 88 FL — SIGNIFICANT CHANGE UP (ref 80–100)
MONOCYTES # BLD AUTO: 1.17 K/UL — HIGH (ref 0–0.9)
MONOCYTES NFR BLD AUTO: 12.4 % — SIGNIFICANT CHANGE UP (ref 2–14)
NEUTROPHILS # BLD AUTO: 6.17 K/UL — SIGNIFICANT CHANGE UP (ref 1.8–7.4)
NEUTROPHILS NFR BLD AUTO: 65.2 % — SIGNIFICANT CHANGE UP (ref 43–77)
NRBC # BLD: 0 /100 WBCS — SIGNIFICANT CHANGE UP (ref 0–0)
PLATELET # BLD AUTO: 249 K/UL — SIGNIFICANT CHANGE UP (ref 150–400)
POTASSIUM SERPL-MCNC: 3.5 MMOL/L — SIGNIFICANT CHANGE UP (ref 3.5–5.3)
POTASSIUM SERPL-SCNC: 3.5 MMOL/L — SIGNIFICANT CHANGE UP (ref 3.5–5.3)
RBC # BLD: 4.74 M/UL — SIGNIFICANT CHANGE UP (ref 4.2–5.8)
RBC # FLD: 14.4 % — SIGNIFICANT CHANGE UP (ref 10.3–14.5)
SODIUM SERPL-SCNC: 146 MMOL/L — HIGH (ref 135–145)
WBC # BLD: 9.46 K/UL — SIGNIFICANT CHANGE UP (ref 3.8–10.5)
WBC # FLD AUTO: 9.46 K/UL — SIGNIFICANT CHANGE UP (ref 3.8–10.5)

## 2019-08-24 PROCEDURE — 99233 SBSQ HOSP IP/OBS HIGH 50: CPT

## 2019-08-24 PROCEDURE — 99232 SBSQ HOSP IP/OBS MODERATE 35: CPT

## 2019-08-24 RX ORDER — HYDRALAZINE HCL 50 MG
50 TABLET ORAL EVERY 8 HOURS
Refills: 0 | Status: DISCONTINUED | OUTPATIENT
Start: 2019-08-24 | End: 2019-08-25

## 2019-08-24 RX ORDER — POTASSIUM CHLORIDE 20 MEQ
40 PACKET (EA) ORAL EVERY 4 HOURS
Refills: 0 | Status: COMPLETED | OUTPATIENT
Start: 2019-08-24 | End: 2019-08-24

## 2019-08-24 RX ADMIN — Medication 40 MILLIGRAM(S): at 06:29

## 2019-08-24 RX ADMIN — Medication 75 MICROGRAM(S): at 06:29

## 2019-08-24 RX ADMIN — Medication 50 MILLIGRAM(S): at 13:09

## 2019-08-24 RX ADMIN — Medication 3 MILLILITER(S): at 13:02

## 2019-08-24 RX ADMIN — Medication 25 MILLIGRAM(S): at 06:29

## 2019-08-24 RX ADMIN — ENOXAPARIN SODIUM 40 MILLIGRAM(S): 100 INJECTION SUBCUTANEOUS at 13:09

## 2019-08-24 RX ADMIN — Medication 40 MILLIEQUIVALENT(S): at 09:16

## 2019-08-24 RX ADMIN — PANTOPRAZOLE SODIUM 40 MILLIGRAM(S): 20 TABLET, DELAYED RELEASE ORAL at 06:29

## 2019-08-24 RX ADMIN — Medication 50 MILLIGRAM(S): at 06:29

## 2019-08-24 RX ADMIN — Medication 40 MILLIEQUIVALENT(S): at 13:14

## 2019-08-24 RX ADMIN — Medication 50 MILLIGRAM(S): at 22:59

## 2019-08-24 RX ADMIN — Medication 40 MILLIGRAM(S): at 18:15

## 2019-08-24 NOTE — PROGRESS NOTE ADULT - SUBJECTIVE AND OBJECTIVE BOX
Patient was examined Chart reviewed Detailed note will be inserted below after going over latest data Meanwhile please refer to my previous notes for Pulmonary/Critical Care assessment recommendations CORDELL FIORE   [address]  5/10/1928 [address]AL[address] DI[address]    ALLERGY      nka   CONTACT [facility]  [address] [place] [id number] [phone]      Initial evaluation/Pulmonary Critical Care consultation requested on 2019    by Dr Bean  from Dr Marmolejo covering Dr Madison  Patient examined chart reviewed    HOSPITAL ADMISSION   PATIENT CAME  FROM (if information available)        TYPE OF VISIT      Subsequent Pulmonary followup     REASON FOR VISIT  PLEASE SEE PROBLEM LIST/ASSESSMENTAND RECOMMENDATIONS     PATIENT CORDELL FIORE   [address]  5/10/1928 [address] GAL[address] DIAS      VITALS/LABS    2019 afeb 83 130/80 18 98%   2019 W 9.4 Hb 13 Plt 249 Na 146 K 3.5 CO2 36 Cr 1.2     GLOBAL ISSUE/BEST PRACTICE:      PROBLEM: HOB elevation:   y            PROBLEM: Stress ulcer proph:    na                      PROBLEM: VTE prophylaxis:    lvnx 40 (2019)   PROBLEM: Glycemic control:    na  PROBLEM: Nutrition:   dash (20190   PROBLEM: Advanced directive: na     PROBLEM: Allergies:  na    REVIEW OF SYMPTOMS     NOTE Changess if any  in ROS and PE are updated in daily HOSPITAL COURSE below      Able to give ROS  Yes     RELIABLE No   CONSTITUTIONAL Weakness Yes  Chills No Vision changes No  ENDOCRINE No unexplained hair loss No heat or cold intolerance    ALLERGY No hives  Sore throat No   RESP Coughing blood no  Shortness of breath YES   NEURO No Headache  Confusion Pain neck No   CARDIAC No Chest pain No Palpitations   GI No Pain abdomen NO   Vomiting NO     PHYSICAL EXAM    HEENT Unremarkable PERRLA atraumatic   RESP Fair air entry EXP prolonged    Harsh breath sound Resp distres mild   CARDIAC S1 S2 No S3     NO JVD    ABDOMEN SOFT BS PRESENT NOT DISTENDED No hepatosplenomegaly PEDAL EDEMA present No calf tenderness  NO rash   GENERAL Not TOXIC looking    PATIENT  CORDELL FIORE   [birth year]  5/10/1928 [address] GAL[address] DIAS                          Patient description                          89 m PMH Hyptn Hypothyroidism former 1 ppd smoker wife smoked too () Not on Home O2 or cpap HO mild hemoptysis x 3w seen by his Pulm HO falls Xarelto dced 1 w No FOB No CT admitted with worsening sob bl le edema     home meds protonix 40 metoprolol 25.2 synthroid 75 xarelto 20 asa 81   (Xarelto and asa held x 1 w0

## 2019-08-24 NOTE — PROGRESS NOTE ADULT - SUBJECTIVE AND OBJECTIVE BOX
Patient is a 89y old  Male who presents with a chief complaint of Acute Decompensated  Heart Failure with HTN emergency (24 Aug 2019 12:57)      FROM ADMISSION H+P:   HPI:  89yo male PMH of Afib (not on AC 2/2 hemoptysis 8/9),  HTN, Hypothyroid, Mild-Mod MR, incomp RBBB, EF 55%  (echo 5/2019), Non-Hodgkin's Lymphoma (s/p resection and chemo, 2004), last admission [facility] 5/2019 for fall and new onset Afib, now presenting for worsening SOB for past several weeks acutely worsening last night. Associated with wheezing. He feels like he cannot breath since last night. SOB associated with cough x 2-3 weeks since a presumed viral infection that he possibly caught from his granddaughter. Symptoms are significantly exacerbated by lying down. Pt has also had worsening LE edema. Two to three weeks ago pt had sore throat and malaise which improved with lingering cough.  Of note, patient's cardiologist stopped his Xarelto (for Afib) when he had hemoptysis from his previous illness in early August. Now, his cough brings up minimal mucus, mostly dry. Pt has not had diagnosis of CHF. Pt's diet is rich in salt. Denies CP, palpitations, fevers/chills, myalgias, joint pain, recent falls, dizziness, HA, blurred vision. Improved symptomatically after the oxygen and lasix 40mg IV x1 in the ED.     VS T 98 oral  -120s on telemonitor sinus tachy, BP max 198/106  RR: 18 SPO2: 93 on RA   Labs INR 1.2 BUN 30, Lactate 2.7  EKG; Sinus Tachy, PACs Incomplete RBBB, unchanged from previous   Imaging :CXR : There are bibasilar and perihilar opacities suggestive of edema.   Received Lasix 40mg IV x1 in ED. (22 Aug 2019 12:07)      ----  INTERVAL HPI/OVERNIGHT EVENTS: Pt seen and evaluated at the bedside. No acute overnight events occurred. Pt admits feeling much better today "when can I get out of here", remains eager for d/c home. Admits chronic LLE edema but markedly improved from previous few days. Denies lightheadedness. Remains on supplemental O2 though.     TELEMETRY: NSR w/ rates in 90's. few episodes of 6-9 beat aberrancy.     ----  PAST MEDICAL & SURGICAL HISTORY:  GERD (gastroesophageal reflux disease)  Non-Hodgkin's lymphoma: Left back s/p resection and chemo  approx 2004  Skin cancer: unknown type s/p chemo 10-15 years ago, s/p resection  HTN (hypertension)  Hypothyroidism  No significant past surgical history      FAMILY HISTORY:  No pertinent family history in first degree relatives      Allergies    No Known Allergies    Intolerances        ----  REVIEW OF SYSTEMS:  CONSTITUTIONAL: admits weakness, denies fever, chills  HEENT: denies blurred vision, sore throat  SKIN: denies new lesions, new rash  CARDIOVASCULAR: denies chest pain, chest pressure, palpitations  RESPIRATORY: admits cough productive of yellow sputum, no dyspnea today  GASTROINTESTINAL: denies nausea, vomiting, diarrhea, abdominal pain  GENITOURINARY: denies dysuria, discharge. admits urinary frequency a/w lasix dosing.   NEUROLOGICAL: denies numbness, headache, focal weakness  MUSCULOSKELETAL: denies new joint pain, muscle aches  HEMATOLOGIC: denies gross bleeding, new bruising    ----  PHYSICAL EXAM:  GENERAL: patient appears comfortable, nasal cannula in place, no distress  EYES: sclera clear, no exudates  NECK: supple, soft, no thyromegaly noted  LUNGS: obvious rales B/L lower lobes, symmetric breath sounds, no wheezing or rhonchi appreciated  HEART: soft S1/S2, regular rate and rhythm, no murmurs noted, 1+ pitting edema in b/l LE but slightly more in LLE  GASTROINTESTINAL: abdomen is soft, nontender, nondistended, normoactive bowel sounds, no palpable masses  INTEGUMENT: good skin turgor, appropriate for ethnicity, appears well perfused, no jaundice noted  MUSCULOSKELETAL: no clubbing or cyanosis, no obvious deformity  NEUROLOGIC: awake, alert, oriented x3, good muscle tone in 4 extremities, no obvious sensory deficits    T(C): 36.9 (08-24-19 @ 15:07), Max: 36.9 (08-23-19 @ 18:00)  HR: 83 (08-24-19 @ 15:07) (81 - 96)  BP: 134/80 (08-24-19 @ 15:07) (128/74 - 174/77)  RR: 18 (08-24-19 @ 15:07) (17 - 20)  SpO2: 98% (08-24-19 @ 15:07) (96% - 99%)  Wt(kg): --    ----  I&O's Summary    23 Aug 2019 07:01  -  24 Aug 2019 07:00  --------------------------------------------------------  IN: 250 mL / OUT: 550 mL / NET: -300 mL        LABS:                        13.0   9.46  )-----------( 249      ( 24 Aug 2019 06:28 )             41.7     08-24    146<H>  |  102  |  23  ----------------------------<  105<H>  3.5   |  36<H>  |  1.20    Ca    8.6      24 Aug 2019 06:28  Phos  3.5     08-23  Mg     2.0     08-23          CAPILLARY BLOOD GLUCOSE          08-22 @ 16:25   No growth to date.  --  --            ----  Personally reviewed:  Vital sign trends: [ x ] yes    [  ] no     [  ] n/a  Laboratory results: [ x ] yes    [  ] no     [  ] n/a  Radiology results: [  ] yes    [  ] no     [ x ] n/a  Culture results: [x  ] yes    [  ] no     [  ] n/a  Consultant recommendations: [ x ] yes    [  ] no     [  ] n/a

## 2019-08-24 NOTE — PROGRESS NOTE ADULT - PROBLEM SELECTOR PLAN 4
-LE doppler showed incidental 1.5 x 0.8 x 0.7 cm cystic structure in the left groin, without internal Doppler vascularity, of uncertain etiology. Abscess or resolving   hematoma unlikely but not excluded.  -Patient can follow up outpatient w/ PCP, will add to d/c instructions to have repeat b/l LE venous doppler in 4-6wks

## 2019-08-24 NOTE — PROGRESS NOTE ADULT - ASSESSMENT
PATIENT  CORDELL FIORE   89  5/10/1928 [address] GALINDO DAIS                             PULMONARY RECOMMENDATIONS        DYSPNEA   Likely sec to chf   RO VTE    V duplex n   FORMER SMOKER   COPD    1 ppd smoker wife smoked too () Not on Home O2 or cpap   Duoneb added ()      RO RESP TRACT INFECTION   -2019 W 9.3-10.3  2019 pc n   Bacterial infection unlikely Pc is low   MILD HEMOPTYSIS   May have been sec to acute bronchitis      V duplex  was n   Will consider getting cta chest if hemoptysis persists  CHF   2019 echo Mod MR ef 55% Likely has HFPEF AOC    2019 CXR perihilar and basal opac suggestive of edema cm Loop recorder   Hydralazine 25.3 () changed Hydralazine 50.3 ()   Lasix 40.2 ()   Cont Rx Monitor lytes cr Pt improved  NEED FOR HOME O2  Will need predc ra pulse ox and if it is below 88% will need Han O2   RECURRENT FALLS      TIME SPENT Over 25 minutes aggregate care time spent on encounter; activities included   direct pt care, counseling and/or coordinating care reviewing notes, lab data/ imaging , discussion with multidisciplinary team/ pt /family. Risks, benefits, alternatives  discussed in deta

## 2019-08-24 NOTE — PROGRESS NOTE ADULT - PROBLEM SELECTOR PLAN 7
DVT prophylaxis: Lovenox 40mg sq daily    IMPROVE VTE Individual Risk Assessment          RISK                                                          Points  [  ] Previous VTE                                                3  [  ] Thrombophilia                                             2  [  ] Lower limb paralysis                                   2        (unable to hold up >15 seconds)    [  ] Current Cancer                                             2         (within 6 months)  [  ] Immobilization > 24 hrs                              1  [  ] ICU/CCU stay > 24 hours                             1  [x  ] Age > 60                                                         1    IMPROVE VTE Score: 1
DVT prophylaxis: Lovenox 40mg sq daily    IMPROVE VTE Individual Risk Assessment          RISK                                                          Points  [  ] Previous VTE                                                3  [  ] Thrombophilia                                             2  [  ] Lower limb paralysis                                   2        (unable to hold up >15 seconds)    [  ] Current Cancer                                             2         (within 6 months)  [  ] Immobilization > 24 hrs                              1  [  ] ICU/CCU stay > 24 hours                             1  [x  ] Age > 60                                                         1    IMPROVE VTE Score: 1

## 2019-08-24 NOTE — PROGRESS NOTE ADULT - ATTENDING COMMENTS
The admission plan was discussed in detail with the patient and family members at the bedside. Their questions and concerns were addressed to the best of my ability. They are in agreement with the plan as detailed above. They demonstrated adequate understanding of the counseling which I have provided.      I missed the pt's family when they were at bedside this morning. Attempted to reach by phone but no answer. Attempted to call both numbers in pt's chart.

## 2019-08-24 NOTE — PROGRESS NOTE ADULT - NSHPATTENDINGPLANDISCUSS_GEN_ALL_CORE
pt, cardio, RN - re: tx plan, disposition planning, above detailed plan
pt, SW, CM, RN, residency team, cardio - re: tx plan, disposition planning, above detailed plan

## 2019-08-24 NOTE — PROGRESS NOTE ADULT - ASSESSMENT
89 yo male PMHx of Afib (not on AC 2/2 hemoptysis 8/9), HTN, Hypothyroid, Mild-Mod MR, incomplete RBBB, CHFpEF 55% (echo 5/2019), Nonhodgkin's Lymphoma (s/p resection and chemo, 2004) presents for SOB for past several days acutely worsening last night. Associated with wheezing. Admitted for acute decompensated HF and hypertensive emergency.     Acute decompensated HF   - CXR shows bibasilar and perihilar opacities suggestive of edema with pro-BNP 9281  - He is now almost euvolemic on exam  - Continue Lasix 40 mg IV q12H today and switch to PO in am  - Please continue to maintain strict I/Os, monitor daily weights, Cr, and K.    Atrial Fibrillation  - EKG showed sinus tachy, PACs,  RBBB, unchanged from previous.  He remains in regular rhythm, rate now normalized but with episodes of aberrantly conducted WCT (6 and 9 beats)  - Continue BB  - Monitor and replete lytes, keep K>4, Mg>2  - Previous ECHO on 5/2019 shows EF 55%, mild-mod mitral valvular disease noted    Hypertensive Urgency   - Improved but still elevated at systolic of 160's.  Hydralazine increased.  Titrate up as neede.  - May add Imdur if needed for better BP control  - Continue home Toprol XL 50mg PO qd  - Monitor routine hemodynamics    DVT ppx  - Per Primary    -Other cardiovascular workup will depend on clinical course.  -All other workup per primary team  -Will follow     Eden Cooper [facility]  Cardiology   Spectra #[phone] 89 yo male PMHx of Afib (not on AC 2/2 hemoptysis 8/9), HTN, Hypothyroid, Mild-Mod MR, incomplete RBBB, CHFpEF 55% (echo 5/2019), Nonhodgkin's Lymphoma (s/p resection and chemo, 2004) presents for SOB for past several days acutely worsening last night. Associated with wheezing. Admitted for acute decompensated HF and hypertensive emergency.     Acute decompensated HF   - CXR shows bibasilar and perihilar opacities suggestive of edema with pro-BNP 9281  - He is now almost euvolemic on exam  - Continue Lasix 40 mg IV q12H today and switch to PO in am, and watch sodium.  - Please continue to maintain strict I/Os, monitor daily weights, Cr, and K.    Atrial Fibrillation  - EKG showed sinus tachy, PACs,  RBBB, unchanged from previous.  He remains in regular rhythm, rate now normalized but with episodes of aberrantly conducted WCT (6 and 9 beats)  - Continue BB  - Monitor and replete lytes, keep K>4, Mg>2  - Previous ECHO on 5/2019 shows EF 55%, mild-mod mitral valvular disease noted    Hypertensive Urgency   - Improved but still elevated at systolic of 160's.  Hydralazine increased.  Titrate up as needed.  - May add Imdur if needed for better BP control  - Continue home Toprol XL 50mg PO qd  - Monitor routine hemodynamics    DVT ppx  - Per Primary    -Other cardiovascular workup will depend on clinical course.  -All other workup per primary team  -Will follow     Eden Cooper [facility]  Cardiology   Spectra #[phone]

## 2019-08-24 NOTE — PROGRESS NOTE ADULT - PROBLEM SELECTOR PLAN 1
- acute hypoxic respiratory failure 2/2 acute diastolic CHF in setting of hypertensive emergency likely due to gradual fluid retention from high salt intake vs paroxysmal Afib  - BP trend improving but still elevated  - remains on lasix 40mg iv bid for now  - Continue home metoprolol succ ER 50 daily  - hydralazine increased today. considering adding imdur  - Previous ECHO on 5/2019 shows EF 55%, mild-mod mitral valvular disease noted  - Troponin negative x3  - lactic acidosis 2/2 hypoxia, resolved  - proBNP 9281  - pulm following (Jaleel)  - cardio following (Savella)  - monitoring lytes closely, supplement k today

## 2019-08-24 NOTE — PROGRESS NOTE ADULT - SUBJECTIVE AND OBJECTIVE BOX
[facility] -- Lucia Brady, Concepcion Feliz Pannella, Patel, Savella  Office # [phone]    Follow Up:  ADHF, Hypertensive Urgency    Subjective/Observations: Awake and alert, on nasal cannula.  Admits to be feeling "fine" now compared to PTA.  Denies any cardiac discomfort    REVIEW OF SYSTEMS: All other review of systems is negative unless indicated above  PAST MEDICAL & SURGICAL HISTORY:  GERD (gastroesophageal reflux disease)  Non-Hodgkin's lymphoma: Left back s/p resection and chemo  approx 2004  Skin cancer: unknown type s/p chemo 10-15 years ago, s/p resection  HTN (hypertension)  Hypothyroidism  No significant past surgical history    MEDICATIONS  (STANDING):  ALBUTerol/ipratropium for Nebulization 3 milliLiter(s) Nebulizer every 6 hours  enoxaparin Injectable 40 milliGRAM(s) SubCutaneous daily  furosemide   Injectable 40 milliGRAM(s) IV Push two times a day  hydrALAZINE 50 milliGRAM(s) Oral every 8 hours  levothyroxine 75 MICROGram(s) Oral daily  metoprolol succinate ER 50 milliGRAM(s) Oral daily  pantoprazole    Tablet 40 milliGRAM(s) Oral before breakfast  potassium chloride    Tablet ER 40 milliEquivalent(s) Oral every 4 hours    MEDICATIONS  (PRN):    Allergies    No Known Allergies    Intolerances    Vital Signs Last 24 Hrs  T(C): 36.6 (24 Aug 2019 08:55), Max: 36.9 (23 Aug 2019 18:00)  T(F): 97.9 (24 Aug 2019 08:55), Max: 98.4 (23 Aug 2019 18:00)  HR: 87 (24 Aug 2019 08:55) (74 - 111)  BP: 128/74 (24 Aug 2019 08:55) (128/74 - 180/80)  BP(mean): --  RR: 17 (24 Aug 2019 08:55) (17 - 20)  SpO2: 97% (24 Aug 2019 08:55) (92% - 99%)  I&O's Summary    23 Aug 2019 07:01  -  24 Aug 2019 07:00  --------------------------------------------------------  IN: 250 mL / OUT: 550 mL / NET: -300 mL    PHYSICAL EXAM:  TELE: NSR with aberrantly conducted WCT  Constitutional: NAD, awake and alert, well-developed  HEENT: Moist Mucous Membranes, Anicteric  Pulmonary: Non-labored, breath sounds are diminished bilaterally R>L, No wheezing, rales or rhonchi  Cardiovascular: Regular, S1 and S2, +murmurs.  No rubs, gallops or clicks  Gastrointestinal: Bowel Sounds present, soft, nontender.   Lymph: No peripheral edema. No lymphadenopathy.  Skin: No visible rashes or ulcers.  Psych:  Mood & affect appropriate  LABS: All Labs Reviewed:                        13.0   9.46  )-----------( 249      ( 24 Aug 2019 06:28 )             41.7                         13.4   10.31 )-----------( 270      ( 23 Aug 2019 07:57 )             42.4                         12.6   9.36  )-----------( 249      ( 22 Aug 2019 10:56 )             40.1     24 Aug 2019 06:28    146    |  102    |  23     ----------------------------<  105    3.5     |  36     |  1.20   23 Aug 2019 07:57    142    |  102    |  23     ----------------------------<  147    3.1     |  30     |  1.20   22 Aug 2019 10:56    143    |  106    |  25     ----------------------------<  136    3.5     |  30     |  1.20     Ca    8.6        24 Aug 2019 06:28  Ca    8.9        23 Aug 2019 07:57  Ca    8.9        22 Aug 2019 10:56  Phos  3.5       23 Aug 2019 07:57  Mg     2.0       23 Aug 2019 07:57    TPro  7.3    /  Alb  3.7    /  TBili  0.8    /  DBili  x      /  AST  27     /  ALT  56     /  AlkPhos  90     22 Aug 2019 10:56    PT/INR - ( 22 Aug 2019 10:56 )   PT: 14.3 sec;   INR: 1.26 ratio      PTT - ( 22 Aug 2019 10:56 )  PTT:25.2 sec  CARDIAC MARKERS ( 23 Aug 2019 07:57 )  .021 ng/mL / x     / 64 U/L / x     / x      CARDIAC MARKERS ( 22 Aug 2019 16:46 )  .043 ng/mL / x     / 84 U/L / x     / 3.8 ng/mL  CARDIAC MARKERS ( 22 Aug 2019 10:56 )  .034 ng/mL / x     / 85 U/L / x     / 4.1 ng/mL    < from: Transthoracic Echocardiogram (05.24.19 @ 11:45) >    Patient name: Christian Villalobos  YOB: 1928   Age: 89 (M)   MR#: [id number]  Study Date: 5/24/2019  [facility]  Study quality: Technically good  Referring Physician: Angel Land MD  Blood Pressure: 143/83 mmHg  Height: 163 cm  Weight: 64 kg  BSA: 1.7 m2  ------------------------------------------------------------------------  PROCEDURE: Transthoracic echocardiogram with 2-D, M-Mode  and complete spectral and color flow Doppler.  INDICATION: Chest pain, unspecified (R07.9)  ------------------------------------------------------------------------  DIMENSIONS:  Dimensions:     Normal Values:  LA:     3.9 cm    2.0 - 4.0 cm  Ao:     3.2 cm    2.0 - 3.8 cm  SEPTUM: 0.7 cm    0.6 - 1.2 cm  PWT:    0.7 cm    0.6 - 1.1 cm  LVIDd:  5.5 cm    3.0 - 5.6 cm  LVIDs:  3.9 cm    1.8 - 4.0 cm  Derived Variables:  LVMI: 81 g/m2  RWT: 0.25  Fractional short: 29 %  Ejection Fraction (Teicholtz): 55 %  ------------------------------------------------------------------------  OBSERVATIONS:  Mitral Valve: Mitral annular calcification, otherwise  normal mitral valve. Mild-moderate mitral regurgitation.  Aortic Root: Normal aortic root.  Aortic Valve: Calcified trileaflet aortic valve with normal  opening. Minimal aortic regurgitation.  Left Atrium: Mildly dilated left atrium.  LA volume index =  40 cc/m2.  Left Ventricle: Endocardium not well visualized; grossly  normal left ventricular systolic function. Normal left  ventricular internal dimensions and wall thicknesses.  Right Heart: Normal right atrium. Normal right ventricular  size and function. Normal tricuspid valve. Minimal  tricuspid regurgitation. Normal pulmonic valve. Minimal  pulmonic regurgitation.  Pericardium/PleuraNormal pericardium with no pericardial  effusion.  ------------------------------------------------------------------------  CONCLUSIONS:  1. Mitral annular calcification, otherwise normal mitral  valve. Mild-moderate mitral regurgitation.  2. Calcified trileaflet aortic valve with normal opening.  3. Mildly dilated left atrium.  LA volume index = 40 cc/m2.  4. Normal left ventricular internal dimensions and wall  thicknesses.  5. Endocardium not well visualized; grossly normal left  ventricular systolic function.  6. Normal right ventricular size and function.  ------------------------------------------------------------------------  Confirmed on  5/24/2019 - 13:32:55 by BENITO Martin  ------------------------------------------------------------------------    < end of copied text >    < from: Xray Chest 1 View AP/PA (08.22.19 @ 10:56) >    EXAM:  XR CHEST AP OR PA 1V                          PROCEDURE DATE:  08/22/2019      INTERPRETATION:  CLINICAL INFORMATION: Shortness of breath and cough.    TECHNIQUE: AP portable view of the chest    COMPARISON: 8/3/2019    FINDINGS:    The cardiac size is mildly enlarged. Aorta is tortuous and   atherosclerotic. Loop recorder again noted overlying the left chest.   There are bibasilar and perihilar opacities suggestive of edema. There   are likely small bilateral pleural effusions. There is no pneumothorax.     IMPRESSION:     Perihilar and bibasilar opacities suggestive of edema. Recommend clinical   correlation for superimposed infection.    CHRISTOPHER PRITCHETT M.D., ATTENDING RADIOLOGIST  This document has been electronically signed. Aug 22 2019 11:20AM     < end of copied text >    < from: 12 Lead ECG (08.22.19 @ 10:09) >    Ventricular Rate 112 BPM    Atrial Rate 112 BPM    P-R Interval 92 ms    QRS Duration 112 ms    Q-T Interval 382 ms    QTC Calculation(Bezet) 521 ms    P Axis 33 degrees    R Axis -20 degrees    T Axis -3 degrees    Diagnosis Line Sinus tachycardiawith short AL with premature atrial complexes  Right bundle branch block  Prolonged QT  Abnormal ECG  When compared with ECG of 03-AUG-2019 13:41,  premature atrial complexes are now present  AL interval has decreased  Confirmed by LUCIA GOULD ([id number])on 8/22/2019 2:59:34 PM    < end of copied text >

## 2019-08-25 ENCOUNTER — TRANSCRIPTION ENCOUNTER (OUTPATIENT)
Age: 84
End: 2019-08-25

## 2019-08-25 VITALS
SYSTOLIC BLOOD PRESSURE: 133 MMHG | OXYGEN SATURATION: 91 % | RESPIRATION RATE: 16 BRPM | TEMPERATURE: 99 F | HEART RATE: 102 BPM | DIASTOLIC BLOOD PRESSURE: 70 MMHG

## 2019-08-25 DIAGNOSIS — I50.9 HEART FAILURE, UNSPECIFIED: ICD-10-CM

## 2019-08-25 LAB
ALBUMIN SERPL ELPH-MCNC: 3.8 G/DL — SIGNIFICANT CHANGE UP (ref 3.3–5)
ALP SERPL-CCNC: 86 U/L — SIGNIFICANT CHANGE UP (ref 40–120)
ALT FLD-CCNC: 38 U/L — SIGNIFICANT CHANGE UP (ref 12–78)
ANION GAP SERPL CALC-SCNC: 7 MMOL/L — SIGNIFICANT CHANGE UP (ref 5–17)
AST SERPL-CCNC: 21 U/L — SIGNIFICANT CHANGE UP (ref 15–37)
BILIRUB DIRECT SERPL-MCNC: 0.2 MG/DL — SIGNIFICANT CHANGE UP (ref 0.05–0.2)
BILIRUB INDIRECT FLD-MCNC: 0.7 MG/DL — SIGNIFICANT CHANGE UP (ref 0.2–1)
BILIRUB SERPL-MCNC: 0.9 MG/DL — SIGNIFICANT CHANGE UP (ref 0.2–1.2)
BUN SERPL-MCNC: 30 MG/DL — HIGH (ref 7–23)
CALCIUM SERPL-MCNC: 9.3 MG/DL — SIGNIFICANT CHANGE UP (ref 8.5–10.1)
CHLORIDE SERPL-SCNC: 98 MMOL/L — SIGNIFICANT CHANGE UP (ref 96–108)
CO2 SERPL-SCNC: 39 MMOL/L — HIGH (ref 22–31)
CREAT SERPL-MCNC: 1.4 MG/DL — HIGH (ref 0.5–1.3)
GLUCOSE SERPL-MCNC: 126 MG/DL — HIGH (ref 70–99)
HCT VFR BLD CALC: 46.6 % — SIGNIFICANT CHANGE UP (ref 39–50)
HGB BLD-MCNC: 14.4 G/DL — SIGNIFICANT CHANGE UP (ref 13–17)
MAGNESIUM SERPL-MCNC: 1.9 MG/DL — SIGNIFICANT CHANGE UP (ref 1.6–2.6)
MCHC RBC-ENTMCNC: 27.2 PG — SIGNIFICANT CHANGE UP (ref 27–34)
MCHC RBC-ENTMCNC: 30.9 GM/DL — LOW (ref 32–36)
MCV RBC AUTO: 87.9 FL — SIGNIFICANT CHANGE UP (ref 80–100)
NRBC # BLD: 0 /100 WBCS — SIGNIFICANT CHANGE UP (ref 0–0)
PHOSPHATE SERPL-MCNC: 3.7 MG/DL — SIGNIFICANT CHANGE UP (ref 2.5–4.5)
PLATELET # BLD AUTO: 282 K/UL — SIGNIFICANT CHANGE UP (ref 150–400)
POTASSIUM SERPL-MCNC: 3.6 MMOL/L — SIGNIFICANT CHANGE UP (ref 3.5–5.3)
POTASSIUM SERPL-SCNC: 3.6 MMOL/L — SIGNIFICANT CHANGE UP (ref 3.5–5.3)
PROT SERPL-MCNC: 7.7 G/DL — SIGNIFICANT CHANGE UP (ref 6–8.3)
RBC # BLD: 5.3 M/UL — SIGNIFICANT CHANGE UP (ref 4.2–5.8)
RBC # FLD: 14.9 % — HIGH (ref 10.3–14.5)
SODIUM SERPL-SCNC: 144 MMOL/L — SIGNIFICANT CHANGE UP (ref 135–145)
WBC # BLD: 13.4 K/UL — HIGH (ref 3.8–10.5)
WBC # FLD AUTO: 13.4 K/UL — HIGH (ref 3.8–10.5)

## 2019-08-25 PROCEDURE — 87040 BLOOD CULTURE FOR BACTERIA: CPT

## 2019-08-25 PROCEDURE — 96374 THER/PROPH/DIAG INJ IV PUSH: CPT

## 2019-08-25 PROCEDURE — 84484 ASSAY OF TROPONIN QUANT: CPT

## 2019-08-25 PROCEDURE — 83036 HEMOGLOBIN GLYCOSYLATED A1C: CPT

## 2019-08-25 PROCEDURE — 99285 EMERGENCY DEPT VISIT HI MDM: CPT | Mod: 25

## 2019-08-25 PROCEDURE — 83880 ASSAY OF NATRIURETIC PEPTIDE: CPT

## 2019-08-25 PROCEDURE — 82553 CREATINE MB FRACTION: CPT

## 2019-08-25 PROCEDURE — 71045 X-RAY EXAM CHEST 1 VIEW: CPT

## 2019-08-25 PROCEDURE — 85730 THROMBOPLASTIN TIME PARTIAL: CPT

## 2019-08-25 PROCEDURE — 83735 ASSAY OF MAGNESIUM: CPT

## 2019-08-25 PROCEDURE — 36415 COLL VENOUS BLD VENIPUNCTURE: CPT

## 2019-08-25 PROCEDURE — 99232 SBSQ HOSP IP/OBS MODERATE 35: CPT

## 2019-08-25 PROCEDURE — 93970 EXTREMITY STUDY: CPT

## 2019-08-25 PROCEDURE — 99239 HOSP IP/OBS DSCHRG MGMT >30: CPT

## 2019-08-25 PROCEDURE — 84443 ASSAY THYROID STIM HORMONE: CPT

## 2019-08-25 PROCEDURE — 80076 HEPATIC FUNCTION PANEL: CPT

## 2019-08-25 PROCEDURE — 83605 ASSAY OF LACTIC ACID: CPT

## 2019-08-25 PROCEDURE — 82550 ASSAY OF CK (CPK): CPT

## 2019-08-25 PROCEDURE — 93005 ELECTROCARDIOGRAM TRACING: CPT

## 2019-08-25 PROCEDURE — 85027 COMPLETE CBC AUTOMATED: CPT

## 2019-08-25 PROCEDURE — 71046 X-RAY EXAM CHEST 2 VIEWS: CPT

## 2019-08-25 PROCEDURE — 84145 PROCALCITONIN (PCT): CPT

## 2019-08-25 PROCEDURE — 80053 COMPREHEN METABOLIC PANEL: CPT

## 2019-08-25 PROCEDURE — 97161 PT EVAL LOW COMPLEX 20 MIN: CPT

## 2019-08-25 PROCEDURE — 85610 PROTHROMBIN TIME: CPT

## 2019-08-25 PROCEDURE — 82962 GLUCOSE BLOOD TEST: CPT

## 2019-08-25 PROCEDURE — 84100 ASSAY OF PHOSPHORUS: CPT

## 2019-08-25 PROCEDURE — 80048 BASIC METABOLIC PNL TOTAL CA: CPT

## 2019-08-25 PROCEDURE — 71046 X-RAY EXAM CHEST 2 VIEWS: CPT | Mod: 26

## 2019-08-25 PROCEDURE — 94640 AIRWAY INHALATION TREATMENT: CPT

## 2019-08-25 RX ORDER — FUROSEMIDE 40 MG
1 TABLET ORAL
Qty: 0 | Refills: 0 | DISCHARGE
Start: 2019-08-25 | End: 2019-09-07

## 2019-08-25 RX ORDER — HYDRALAZINE HCL 50 MG
50 TABLET ORAL EVERY 6 HOURS
Refills: 0 | Status: DISCONTINUED | OUTPATIENT
Start: 2019-08-25 | End: 2019-08-25

## 2019-08-25 RX ORDER — FUROSEMIDE 40 MG
1 TABLET ORAL
Qty: 28 | Refills: 0
Start: 2019-08-25 | End: 2019-09-07

## 2019-08-25 RX ORDER — POTASSIUM CHLORIDE 20 MEQ
2 PACKET (EA) ORAL
Qty: 28 | Refills: 0
Start: 2019-08-25 | End: 2019-09-07

## 2019-08-25 RX ORDER — POTASSIUM CHLORIDE 20 MEQ
40 PACKET (EA) ORAL ONCE
Refills: 0 | Status: COMPLETED | OUTPATIENT
Start: 2019-08-25 | End: 2019-08-25

## 2019-08-25 RX ORDER — POTASSIUM CHLORIDE 20 MEQ
40 PACKET (EA) ORAL ONCE
Refills: 0 | Status: DISCONTINUED | OUTPATIENT
Start: 2019-08-25 | End: 2019-08-25

## 2019-08-25 RX ORDER — POTASSIUM CHLORIDE 20 MEQ
0.5 PACKET (EA) ORAL
Qty: 0 | Refills: 0 | DISCHARGE
Start: 2019-08-25 | End: 2019-09-07

## 2019-08-25 RX ORDER — HYDRALAZINE HCL 50 MG
1 TABLET ORAL
Qty: 42 | Refills: 0
Start: 2019-08-25 | End: 2019-09-07

## 2019-08-25 RX ADMIN — Medication 40 MILLIGRAM(S): at 06:13

## 2019-08-25 RX ADMIN — Medication 50 MILLIGRAM(S): at 13:47

## 2019-08-25 RX ADMIN — Medication 40 MILLIEQUIVALENT(S): at 09:01

## 2019-08-25 RX ADMIN — Medication 50 MILLIGRAM(S): at 06:13

## 2019-08-25 RX ADMIN — PANTOPRAZOLE SODIUM 40 MILLIGRAM(S): 20 TABLET, DELAYED RELEASE ORAL at 06:13

## 2019-08-25 RX ADMIN — Medication 75 MICROGRAM(S): at 06:13

## 2019-08-25 NOTE — PHYSICAL THERAPY INITIAL EVALUATION ADULT - ADDITIONAL COMMENTS
Pt lives with his dtr in a house, + steps to bedroom. Pt ambulated independently without device (has RW and SC to use as needed) and dtr assisted with ADLs as needed.

## 2019-08-25 NOTE — DISCHARGE NOTE PROVIDER - HOSPITAL COURSE
ADMISSION H+P:        HPI:    89yo male PMH of Afib (not on AC 2/2 hemoptysis 8/9),  HTN, Hypothyroid, Mild-Mod MR, incomp RBBB, EF 55%  (echo 5/2019), Non-Hodgkin's Lymphoma (s/p resection and chemo, 2004), last admission [facility] 5/2019 for fall and new onset Afib, now presenting for worsening SOB for past several weeks acutely worsening last night. Associated with wheezing. He feels like he cannot breath since last night. SOB associated with cough x 2-3 weeks since a presumed viral infection that he possibly caught from his granddaughter. Symptoms are significantly exacerbated by lying down. Pt has also had worsening LE edema. Two to three weeks ago pt had sore throat and malaise which improved with lingering cough.  Of note, patient's cardiologist stopped his Xarelto (for Afib) when he had hemoptysis from his previous illness in early August. Now, his cough brings up minimal mucus, mostly dry. Pt has not had diagnosis of CHF. Pt's diet is rich in salt. Denies CP, palpitations, fevers/chills, myalgias, joint pain, recent falls, dizziness, HA, blurred vision. Improved symptomatically after the oxygen and lasix 40mg IV x1 in the ED.         VS T 98 oral  -120s on telemonitor sinus tachy, BP max 198/106  RR: 18 SPO2: 93 on RA     Labs INR 1.2 BUN 30, Lactate 2.7    EKG; Sinus Tachy, PACs Incomplete RBBB, unchanged from previous     Imaging :CXR : There are bibasilar and perihilar opacities suggestive of edema.     Received Lasix 40mg IV x1 in ED. (22 Aug 2019 12:07)        ---    HOSPITAL COURSE:     Patient admitted for acute hypoxic respiratory failure 2/2 acute diastolic CHF in setting of hypertensive emergency. Cardiology Dr. Brady's group and pulmonology Dr. Marmolejo were consulted. Patient was continued on home metroprolol and started on hydralazine which was titrated up as tolerated. Patient was started on IV lasix 40mg BID but was switched to PO prior to discharge. Patient's blood pressure steadily improved throughout the course of hospitalization. Troponin was negative x3. Patient was recently hospitalized in early August for hemoptysis at which             Patient was medically optimized and improved clinically throughout hospital course. Patient seen and examined on day of discharge.        Vital Signs    T(C): 36.7 (25 Aug 2019 08:05), Max: 36.9 (24 Aug 2019 15:07)    T(F): 98 (25 Aug 2019 08:05), Max: 98.4 (24 Aug 2019 15:07)    HR: 85 (25 Aug 2019 08:05) (60 - 96)    BP: 123/72 (25 Aug 2019 08:05) (123/72 - 160/80)    RR: 16 (25 Aug 2019 08:05) (16 - 18)    SpO2: 94% (25 Aug 2019 08:05) (94% - 98%)        Physical Exam:    General: well-developed, well-nourished, NAD    HEENT: normocephalic, atraumatic, EOMI, moist mucous membranes     Neck: supple, non-tender, no masses    Neurology: AAOx3, sensation intact    Respiratory: clear to auscultation bilaterally; no wheezes, rhonchi, or rales    CV: regular rate and rhythm, soft S1/S2, no murmurs, rubs, or gallops    Abdominal: soft, non-tender, non-distended, bowel sounds present    Extremities: no clubbing, cyanosis, or edema; palpable peripheral pulses    Musculoskeletal: no joint erythema or warmth, no joint swelling     Skin: warm, dry, normal color        Patient is medically stable for discharge to ____ with outpatient follow up.    ---    CONSULTANTS:         ---    FINAL DISCHARGE DIAGNOSIS LIST:    Please see last daily progress note for final discharge diagnoses ADMISSION H+P:        HPI:    89yo male PMH of Afib (not on AC 2/2 hemoptysis 8/9),  HTN, Hypothyroid, Mild-Mod MR, incomp RBBB, EF 55%  (echo 5/2019), Non-Hodgkin's Lymphoma (s/p resection and chemo, 2004), last admission [facility] 5/2019 for fall and new onset Afib, now presenting for worsening SOB for past several weeks acutely worsening last night. Associated with wheezing. He feels like he cannot breath since last night. SOB associated with cough x 2-3 weeks since a presumed viral infection that he possibly caught from his granddaughter. Symptoms are significantly exacerbated by lying down. Pt has also had worsening LE edema. Two to three weeks ago pt had sore throat and malaise which improved with lingering cough.  Of note, patient's cardiologist stopped his Xarelto (for Afib) when he had hemoptysis from his previous illness in early August. Now, his cough brings up minimal mucus, mostly dry. Pt has not had diagnosis of CHF. Pt's diet is rich in salt. Denies CP, palpitations, fevers/chills, myalgias, joint pain, recent falls, dizziness, HA, blurred vision. Improved symptomatically after the oxygen and lasix 40mg IV x1 in the ED.         VS T 98 oral  -120s on telemonitor sinus tachy, BP max 198/106  RR: 18 SPO2: 93 on RA     Labs INR 1.2 BUN 30, Lactate 2.7    EKG; Sinus Tachy, PACs Incomplete RBBB, unchanged from previous     Imaging :CXR : There are bibasilar and perihilar opacities suggestive of edema.     Received Lasix 40mg IV x1 in ED. (22 Aug 2019 12:07)        ---    HOSPITAL COURSE:     Patient admitted for acute hypoxic respiratory failure 2/2 acute diastolic CHF in setting of hypertensive emergency. Cardiology Dr. Brady's group and pulmonology Dr. Marmolejo were consulted. Patient was continued on home metroprolol and started on hydralazine which was titrated up as tolerated. Patient was started on IV lasix 40mg BID but was switched to PO prior to discharge. Patient's blood pressure steadily improved throughout the course of hospitalization. Troponin was negative x3. Patient was recently hospitalized in early August for hemoptysis likely 2/2 to bronchitis at which time his eliquis for Afib was discontinued. Venous doppler showed no evidence of DVT. However, patient was found to have incidental 1.5 x 0.8 x 0.7 cm cystic structure in the left groin, without internal Doppler vascularity, of uncertain etiology. Abscess or resolving hematoma unlikely but not excluded. Patient can follow up outpatient w/ PCP, will add to d/c instructions to have repeat b/l LE venous doppler in 4-6wks.                 Patient was medically optimized and improved clinically throughout hospital course. Patient seen and examined on day of discharge.        Vital Signs    T(C): 36.7 (25 Aug 2019 08:05), Max: 36.9 (24 Aug 2019 15:07)    T(F): 98 (25 Aug 2019 08:05), Max: 98.4 (24 Aug 2019 15:07)    HR: 85 (25 Aug 2019 08:05) (60 - 96)    BP: 123/72 (25 Aug 2019 08:05) (123/72 - 160/80)    RR: 16 (25 Aug 2019 08:05) (16 - 18)    SpO2: 94% (25 Aug 2019 08:05) (94% - 98%)        Physical Exam:    General: well-developed, well-nourished, NAD    HEENT: normocephalic, atraumatic, EOMI, moist mucous membranes     Neck: supple, non-tender, no masses    Neurology: AAOx3, sensation intact    Respiratory: clear to auscultation bilaterally; no wheezes, rhonchi, or rales    CV: regular rate and rhythm, soft S1/S2, no murmurs, rubs, or gallops    Abdominal: soft, non-tender, non-distended, bowel sounds present    Extremities: no clubbing, cyanosis, or edema; palpable peripheral pulses    Musculoskeletal: no joint erythema or warmth, no joint swelling     Skin: warm, dry, normal color        Patient is medically stable for discharge to ____ with outpatient follow up.    ---    CONSULTANTS:         ---    FINAL DISCHARGE DIAGNOSIS LIST:    Please see last daily progress note for final discharge diagnoses ADMISSION H+P:        HPI:    89yo male PMH of Afib (not on AC 2/2 hemoptysis 8/9),  HTN, Hypothyroid, Mild-Mod MR, incomp RBBB, EF 55%  (echo 5/2019), Non-Hodgkin's Lymphoma (s/p resection and chemo, 2004), last admission [facility] 5/2019 for fall and new onset Afib, now presenting for worsening SOB for past several weeks acutely worsening last night. Associated with wheezing. He feels like he cannot breath since last night. SOB associated with cough x 2-3 weeks since a presumed viral infection that he possibly caught from his granddaughter. Symptoms are significantly exacerbated by lying down. Pt has also had worsening LE edema. Two to three weeks ago pt had sore throat and malaise which improved with lingering cough.  Of note, patient's cardiologist stopped his Xarelto (for Afib) when he had hemoptysis from his previous illness in early August. Now, his cough brings up minimal mucus, mostly dry. Pt has not had diagnosis of CHF. Pt's diet is rich in salt. Denies CP, palpitations, fevers/chills, myalgias, joint pain, recent falls, dizziness, HA, blurred vision. Improved symptomatically after the oxygen and lasix 40mg IV x1 in the ED.         ---    HOSPITAL COURSE:     Patient admitted for acute hypoxic respiratory failure 2/2 acute diastolic CHF in setting of hypertensive emergency. Cardiology Dr. Brady's group and pulmonology Dr. Marmolejo were consulted. Patient was continued on home metroprolol and started on hydralazine which was titrated up as tolerated. Patient was started on IV lasix 40mg BID but was switched to PO prior to discharge. Patient's blood pressure steadily improved throughout the course of hospitalization. Troponin was negative x3. Patient was recently hospitalized in early August for hemoptysis likely 2/2 to bronchitis at which time his eliquis for Afib was discontinued. Venous doppler showed no evidence of DVT. However, patient was found to have incidental 1.5 x 0.8 x 0.7 cm cystic structure in the left groin, without internal Doppler vascularity, of uncertain etiology. Abscess or resolving hematoma unlikely but not excluded. Patient can follow up outpatient w/ PCP, will add to d/c instructions to have repeat b/l LE venous doppler in 4-6wks.         Patient was medically optimized and improved clinically throughout hospital course. Patient seen and examined on day of discharge.        Vital Signs    T(C): 36.7 (25 Aug 2019 08:05), Max: 36.9 (24 Aug 2019 15:07)    T(F): 98 (25 Aug 2019 08:05), Max: 98.4 (24 Aug 2019 15:07)    HR: 85 (25 Aug 2019 08:05) (60 - 96)    BP: 123/72 (25 Aug 2019 08:05) (123/72 - 160/80)    RR: 16 (25 Aug 2019 08:05) (16 - 18)    SpO2: 94% (25 Aug 2019 08:05) (94% - 98%)        PHYSICAL EXAM:    GENERAL: patient appears comfortable, tolerating room air since last evening, no distress, speaking in full sentences - "when can I go home? please!"    EYES: sclera clear, no exudates    NECK: supple, soft, no thyromegaly noted    LUNGS: no rales in B/L lower lobes, symmetric breath sounds, no wheezing or rhonchi appreciated    HEART: soft S1/S2, regular rate and rhythm, no murmurs noted, no pitting edema in b/l LE     GASTROINTESTINAL: abdomen is soft, nontender, nondistended, normoactive bowel sounds, no palpable masses    INTEGUMENT: good skin turgor, appropriate for ethnicity, appears well perfused, no jaundice noted    MUSCULOSKELETAL: no clubbing or cyanosis, no obvious deformity    NEUROLOGIC: awake, alert, oriented x3, good muscle tone in 4 extremities, no obvious sensory deficits        ---    CONSULTANTS:     Cardio (Ziggy)    Pulm (Jaleel)        ---    The total amount of time spent reviewing the hospital notes, laboratory values, imaging findings, assessing/counseling the patient, discussing with consultant physicians, social work, nursing staff took 52 minutes ADMISSION H+P:        HPI:    89yo male PMH of Afib (not on AC 2/2 hemoptysis 8/9),  HTN, Hypothyroid, Mild-Mod MR, incomp RBBB, EF 55%  (echo 5/2019), Non-Hodgkin's Lymphoma (s/p resection and chemo, 2004), last admission [facility] 5/2019 for fall and new onset Afib, now presenting for worsening SOB for past several weeks acutely worsening last night. Associated with wheezing. He feels like he cannot breath since last night. SOB associated with cough x 2-3 weeks since a presumed viral infection that he possibly caught from his granddaughter. Symptoms are significantly exacerbated by lying down. Pt has also had worsening LE edema. Two to three weeks ago pt had sore throat and malaise which improved with lingering cough.  Of note, patient's cardiologist stopped his Xarelto (for Afib) when he had hemoptysis from his previous illness in early August. Now, his cough brings up minimal mucus, mostly dry. Pt has not had diagnosis of CHF. Pt's diet is rich in salt. Denies CP, palpitations, fevers/chills, myalgias, joint pain, recent falls, dizziness, HA, blurred vision. Improved symptomatically after the oxygen and lasix 40mg IV x1 in the ED.         ---    HOSPITAL COURSE:     Patient admitted for acute hypoxic respiratory failure 2/2 acute diastolic CHF in setting of hypertensive emergency. Cardiology Dr. Brady's group and pulmonology Dr. Marmolejo were consulted. Patient was continued on home metroprolol and started on hydralazine which was titrated up as tolerated. Patient was started on IV lasix 40mg BID but was switched to PO prior to discharge. Patient's blood pressure steadily improved throughout the course of hospitalization. Troponin was negative x3. Patient was recently hospitalized in early August for hemoptysis likely 2/2 to bronchitis at which time his eliquis for Afib was discontinued. Venous doppler showed no evidence of DVT. However, patient was found to have incidental 1.5 x 0.8 x 0.7 cm cystic structure in the left groin, without internal Doppler vascularity, of uncertain etiology. Abscess or resolving hematoma unlikely but not excluded. Patient can follow up outpatient w/ PCP, will add to d/c instructions to have repeat b/l LE venous doppler in 4-6wks. Pt had mildly elevated WBC on day of discharge, repeat CXR was performed which showed resolved bibasilar atelectasis vs. effusion. Pt has no new complaints today and clinically is as well as he has appeared since hospitalization. Creatinine also bumped slightly on day of discharge but pt has been aggressively diuresed the previous 2.5days. The dose of lasix was reduced on day of discharge, suspect creatinine will plateau. Pt was advised to f/u closely w/ PCP and cardiologist this week.         Patient was medically optimized and improved clinically throughout hospital course. Patient seen and examined on day of discharge.        Vital Signs    T(C): 36.7 (25 Aug 2019 08:05), Max: 36.9 (24 Aug 2019 15:07)    T(F): 98 (25 Aug 2019 08:05), Max: 98.4 (24 Aug 2019 15:07)    HR: 85 (25 Aug 2019 08:05) (60 - 96)    BP: 123/72 (25 Aug 2019 08:05) (123/72 - 160/80)    RR: 16 (25 Aug 2019 08:05) (16 - 18)    SpO2: 94% (25 Aug 2019 08:05) (94% - 98%)        PHYSICAL EXAM:    GENERAL: patient appears comfortable, tolerating room air since last evening, no distress, speaking in full sentences - "when can I go home? please!"    EYES: sclera clear, no exudates    NECK: supple, soft, no thyromegaly noted    LUNGS: no rales in B/L lower lobes, symmetric breath sounds, no wheezing or rhonchi appreciated    HEART: soft S1/S2, regular rate and rhythm, no murmurs noted, no pitting edema in b/l LE     GASTROINTESTINAL: abdomen is soft, nontender, nondistended, normoactive bowel sounds, no palpable masses    INTEGUMENT: good skin turgor, appropriate for ethnicity, appears well perfused, no jaundice noted    MUSCULOSKELETAL: no clubbing or cyanosis, no obvious deformity    NEUROLOGIC: awake, alert, oriented x3, good muscle tone in 4 extremities, no obvious sensory deficits        ---    CONSULTANTS:     Cardio (Ziggy)    Pulm (Jaleel)        ---    The total amount of time spent reviewing the hospital notes, laboratory values, imaging findings, assessing/counseling the patient, discussing with consultant physicians, social work, nursing staff took 52 minutes

## 2019-08-25 NOTE — DISCHARGE NOTE PROVIDER - NSDCCPCAREPLAN_GEN_ALL_CORE_FT
PRINCIPAL DISCHARGE DIAGNOSIS  Diagnosis: CHF (congestive heart failure)  Assessment and Plan of Treatment: Recommend continuing medications as prescribed. Continue with lasix 40mg twice daily but please follow up with cardiology this week to reassess this appropriateness of this dosing. This is a new medication and this is an aggressive regimen to go home on. If you feel dry (dry tongue, tired, thirsty), please hold 1 dose of lasix and call your cardiologist for eval. If you feel lightheaded or that you may pass out, please return directly to the ED.      SECONDARY DISCHARGE DIAGNOSES  Diagnosis: Abnormal finding on ultrasound  Assessment and Plan of Treatment: Recommend undergoing another venous doppler (ultrasound) with your primary care provider in the next 4-6wks. There was a small vascular abnormality noted on ultrasound but it is not recommended to undergo further inpatient evaluation at this time. Routine follow up with ultrasound and follow up with your PCP is recommended.    Diagnosis: Hypertensive urgency  Assessment and Plan of Treatment: Your blood pressure was very high in the ED. Hydralazine is also a new medication for you, please continue taking this regimen and discuss further dosing with your cardiologist. PRINCIPAL DISCHARGE DIAGNOSIS  Diagnosis: CHF (congestive heart failure)  Assessment and Plan of Treatment: Recommend continuing medications as prescribed. Continue with lasix 40mg twice daily but please follow up with cardiology this week to reassess this appropriateness of this dosing. This is a new medication and this is an aggressive regimen to go home on. If you feel dry (dry tongue, tired, thirsty), please hold 1 dose of lasix and call your cardiologist for eval. If you feel lightheaded or that you may pass out, please return directly to the ED. Please have your kidney function checked at your follow up appointment. On day of discharge, your creatinine is 1.4.      SECONDARY DISCHARGE DIAGNOSES  Diagnosis: Abnormal finding on ultrasound  Assessment and Plan of Treatment: Recommend undergoing another venous doppler (ultrasound) with your primary care provider in the next 4-6wks. There was a small vascular abnormality noted on ultrasound but it is not recommended to undergo further inpatient evaluation at this time. Routine follow up with ultrasound and follow up with your PCP is recommended.    Diagnosis: Hypertensive urgency  Assessment and Plan of Treatment: Your blood pressure was very high in the ED. Hydralazine is also a new medication for you, please continue taking this regimen and discuss further dosing with your cardiologist.

## 2019-08-25 NOTE — PROGRESS NOTE ADULT - PROBLEM SELECTOR PLAN 1
copd  chf  atelectasis  frail and weak elderly  htn meds optimization  diuresis  dc planning  oral hygiene  skin care  COPD rx regimen

## 2019-08-25 NOTE — DISCHARGE NOTE NURSING/CASE MANAGEMENT/SOCIAL WORK - NSDCDPATPORTLINK_GEN_ALL_CORE
You can access the [facility] Portal, offered by [facility], by registering with the following website: [email or web address]

## 2019-08-25 NOTE — DISCHARGE NOTE PROVIDER - PROVIDER TOKENS
FREE:[LAST:[[place]],FIRST:[Sb],PHONE:[phone]:[(   )    -],FOLLOWUP:[1 week]],FREE:[LAST:[Dr. Pringle],PHONE:[(   )    -],FAX:[(   )    -],ADDRESS:[[facility]],FOLLOWUP:[1-3 days]]

## 2019-08-25 NOTE — PROGRESS NOTE ADULT - SUBJECTIVE AND OBJECTIVE BOX
[facility] -- Lucia Brady, Concepcion Feliz Pannella, Patel, Savella  Office # [phone]    Follow Up:  ADHF, hypertensive urgency    Subjective/Observations: Awake and alert, laying almost flat on RA.  No discomfort offered.  Denies any respiratory or cardiac complaints    REVIEW OF SYSTEMS: All other review of systems is negative unless indicated above  PAST MEDICAL & SURGICAL HISTORY:  GERD (gastroesophageal reflux disease)  Non-Hodgkin's lymphoma: Left back s/p resection and chemo  approx 2004  Skin cancer: unknown type s/p chemo 10-15 years ago, s/p resection  HTN (hypertension)  Hypothyroidism  No significant past surgical history    MEDICATIONS  (STANDING):  ALBUTerol/ipratropium for Nebulization 3 milliLiter(s) Nebulizer every 6 hours  enoxaparin Injectable 40 milliGRAM(s) SubCutaneous daily  hydrALAZINE 50 milliGRAM(s) Oral every 8 hours  levothyroxine 75 MICROGram(s) Oral daily  metoprolol succinate ER 50 milliGRAM(s) Oral daily  pantoprazole    Tablet 40 milliGRAM(s) Oral before breakfast  potassium chloride    Tablet ER 40 milliEquivalent(s) Oral once  potassium chloride    Tablet ER 40 milliEquivalent(s) Oral once    MEDICATIONS  (PRN):    Allergies    No Known Allergies    Intolerances    Vital Signs Last 24 Hrs  T(C): 36.4 (25 Aug 2019 04:42), Max: 36.9 (24 Aug 2019 15:07)  T(F): 97.6 (25 Aug 2019 04:42), Max: 98.4 (24 Aug 2019 15:07)  HR: 60 (25 Aug 2019 04:42) (60 - 96)  BP: 160/80 (25 Aug 2019 04:42) (128/74 - 160/80)  BP(mean): --  RR: 18 (25 Aug 2019 04:42) (17 - 18)  SpO2: 95% (25 Aug 2019 04:42) (95% - 98%)  I&O's Summary    24 Aug 2019 07:01  -  25 Aug 2019 07:00  --------------------------------------------------------  IN: 0 mL / OUT: 175 mL / NET: -175 mL    PHYSICAL EXAM:  TELE: SR/Stach  Constitutional: NAD, awake and alert, well-developed  HEENT: Moist Mucous Membranes, Anicteric  Pulmonary: Non-labored, breath sounds are clear bilaterally, No wheezing, rales or rhonchi  Cardiovascular: Regular, S1 and S2, No murmurs, rubs, gallops or clicks  Gastrointestinal: Bowel Sounds present, soft, nontender.   Lymph: No peripheral edema. No lymphadenopathy.  Skin: No visible rashes or ulcers.  Psych:  Mood & affect appropriate  LABS: All Labs Reviewed:                        14.4   13.40 )-----------( 282      ( 25 Aug 2019 06:36 )             46.6                         13.0   9.46  )-----------( 249      ( 24 Aug 2019 06:28 )             41.7                         13.4   10.31 )-----------( 270      ( 23 Aug 2019 07:57 )             42.4     25 Aug 2019 06:36    144    |  98     |  30     ----------------------------<  126    3.6     |  39     |  1.40   24 Aug 2019 06:28    146    |  102    |  23     ----------------------------<  105    3.5     |  36     |  1.20   23 Aug 2019 07:57    142    |  102    |  23     ----------------------------<  147    3.1     |  30     |  1.20     Ca    9.3        25 Aug 2019 06:36  Ca    8.6        24 Aug 2019 06:28  Ca    8.9        23 Aug 2019 07:57  Phos  3.7       25 Aug 2019 06:36  Phos  3.5       23 Aug 2019 07:57  Mg     1.9       25 Aug 2019 06:36  Mg     2.0       23 Aug 2019 07:57    TPro  7.7    /  Alb  3.8    /  TBili  0.9    /  DBili  .20    /  AST  21     /  ALT  38     /  AlkPhos  86     25 Aug 2019 06:36  TPro  7.3    /  Alb  3.7    /  TBili  0.8    /  DBili  x      /  AST  27     /  ALT  56     /  AlkPhos  90     22 Aug 2019 10:56    < from: Transthoracic Echocardiogram (05.24.19 @ 11:45) >    Patient name: Christian Villalobos  YOB: 1928   Age: 89 (M)   MR#: [id number]  Study Date: 5/24/2019  [facility]  Study quality: Technically good  Referring Physician: Angel Land MD  Blood Pressure: 143/83 mmHg  Height: 163 cm  Weight: 64 kg  BSA: 1.7 m2  ------------------------------------------------------------------------  PROCEDURE: Transthoracic echocardiogram with 2-D, M-Mode  and complete spectral and color flow Doppler.  INDICATION: Chest pain, unspecified (R07.9)  ------------------------------------------------------------------------  DIMENSIONS:  Dimensions:     Normal Values:  LA:     3.9 cm    2.0 - 4.0 cm  Ao:     3.2 cm    2.0 - 3.8 cm  SEPTUM: 0.7 cm    0.6 - 1.2 cm  PWT:    0.7 cm    0.6 - 1.1 cm  LVIDd:  5.5 cm    3.0 - 5.6 cm  LVIDs:  3.9 cm    1.8 - 4.0 cm  Derived Variables:  LVMI: 81 g/m2  RWT: 0.25  Fractional short: 29 %  Ejection Fraction (Teicholtz): 55 %  ------------------------------------------------------------------------  OBSERVATIONS:  Mitral Valve: Mitral annular calcification, otherwise  normal mitral valve. Mild-moderate mitral regurgitation.  Aortic Root: Normal aortic root.  Aortic Valve: Calcified trileaflet aortic valve with normal  opening. Minimal aortic regurgitation.  Left Atrium: Mildly dilated left atrium.  LA volume index =  40 cc/m2.  Left Ventricle: Endocardium not well visualized; grossly  normal left ventricular systolic function. Normal left  ventricular internal dimensions and wall thicknesses.  Right Heart: Normal right atrium. Normal right ventricular  size and function. Normal tricuspid valve. Minimal  tricuspid regurgitation. Normal pulmonic valve. Minimal  pulmonic regurgitation.  Pericardium/Pleura Normal pericardium with no pericardial  effusion.  ------------------------------------------------------------------------  CONCLUSIONS:  1. Mitral annular calcification, otherwise normal mitral  valve. Mild-moderate mitral regurgitation.  2. Calcified trileaflet aortic valve with normal opening.  3. Mildly dilated left atrium.  LA volume index = 40 cc/m2.  4. Normal left ventricular internal dimensions and wall  thicknesses.  5. Endocardium not well visualized; grossly normal left  ventricular systolic function.  6. Normal right ventricular size and function.  ------------------------------------------------------------------------  Confirmed on  5/24/2019 - 13:32:55 by BENITO Martin  ------------------------------------------------------------------------    < end of copied text >    < from: Xray Chest 1 View AP/PA (08.22.19 @ 10:56) >    EXAM:  XR CHEST AP OR PA 1V                          PROCEDURE DATE:  08/22/2019      INTERPRETATION:  CLINICAL INFORMATION: Shortness of breath and cough.    TECHNIQUE: AP portable view of the chest    COMPARISON: 8/3/2019    FINDINGS:    The cardiac size is mildly enlarged. Aorta is tortuous and   atherosclerotic. Loop recorder again noted overlying the left chest.   There are bibasilar and perihilar opacities suggestive of edema. There   are likely small bilateral pleural effusions. There is no pneumothorax.     IMPRESSION:     Perihilar and bibasilar opacities suggestive of edema. Recommend clinical   correlation for superimposed infection.    CHRISTOPHER PRITCHETT M.D., ATTENDING RADIOLOGIST  This document has been electronically signed. Aug 22 2019 11:20AM      < end of copied text >

## 2019-08-25 NOTE — PROGRESS NOTE ADULT - SUBJECTIVE AND OBJECTIVE BOX
Date/Time Patient Seen:  		  Referring MD:   Data Reviewed	       Patient is a 89y old  Male who presents with a chief complaint of Acute Decompensated  Heart Failure with HTN emergency (25 Aug 2019 12:07)      Subjective/HPI     PAST MEDICAL & SURGICAL HISTORY:  GERD (gastroesophageal reflux disease)  Non-Hodgkin's lymphoma: Left back s/p resection and chemo  approx 2004  Skin cancer: unknown type s/p chemo 10-15 years ago, s/p resection  Urinary retention  HTN (hypertension)  Hypothyroidism  No significant past surgical history        Medication list         MEDICATIONS  (STANDING):  ALBUTerol/ipratropium for Nebulization 3 milliLiter(s) Nebulizer every 6 hours  enoxaparin Injectable 40 milliGRAM(s) SubCutaneous daily  hydrALAZINE 50 milliGRAM(s) Oral every 6 hours  levothyroxine 75 MICROGram(s) Oral daily  metoprolol succinate ER 50 milliGRAM(s) Oral daily  pantoprazole    Tablet 40 milliGRAM(s) Oral before breakfast  potassium chloride    Tablet ER 40 milliEquivalent(s) Oral once    MEDICATIONS  (PRN):         Vitals log        ICU Vital Signs Last 24 Hrs  T(C): 37 (25 Aug 2019 12:30), Max: 37 (25 Aug 2019 12:30)  T(F): 98.6 (25 Aug 2019 12:30), Max: 98.6 (25 Aug 2019 12:30)  HR: 102 (25 Aug 2019 12:30) (60 - 102)  BP: 133/70 (25 Aug 2019 12:30) (123/72 - 160/80)  BP(mean): --  ABP: --  ABP(mean): --  RR: 16 (25 Aug 2019 12:30) (16 - 18)  SpO2: 91% (25 Aug 2019 12:30) (91% - 98%)           Input and Output:  I&O's Detail    24 Aug 2019 07:01  -  25 Aug 2019 07:00  --------------------------------------------------------  IN:  Total IN: 0 mL    OUT:    Voided: 175 mL  Total OUT: 175 mL    Total NET: -175 mL          Lab Data                        14.4   13.40 )-----------( 282      ( 25 Aug 2019 06:36 )             46.6     08-25    144  |  98  |  30<H>  ----------------------------<  126<H>  3.6   |  39<H>  |  1.40<H>    Ca    9.3      25 Aug 2019 06:36  Phos  3.7     08-25  Mg     1.9     08-25    TPro  7.7  /  Alb  3.8  /  TBili  0.9  /  DBili  .20  /  AST  21  /  ALT  38  /  AlkPhos  86  08-25            Review of Systems	      Objective     Physical Examination    heart s1s2  lung dec BS  abd soft      Pertinent Lab findings & Imaging      Franci:  NO   Adequate UO     I&O's Detail    24 Aug 2019 07:01  -  25 Aug 2019 07:00  --------------------------------------------------------  IN:  Total IN: 0 mL    OUT:    Voided: 175 mL  Total OUT: 175 mL    Total NET: -175 mL               Discussed with:     Cultures:	        Radiology

## 2019-08-25 NOTE — DISCHARGE NOTE PROVIDER - CARE PROVIDER_API CALL
Sb Quinn  Phone: [phone]  Fax: (   )    -  Follow Up Time: 1 week    Dr. Pringle   [facility]  Phone: (   )    -  Fax: (   )    -  Follow Up Time: 1-3 days

## 2019-08-25 NOTE — PROGRESS NOTE ADULT - ASSESSMENT
89 yo male PMHx of Afib (not on AC 2/2 hemoptysis 8/9), HTN, Hypothyroid, Mild-Mod MR, incomplete RBBB, CHFpEF 55% (echo 5/2019), Nonhodgkin's Lymphoma (s/p resection and chemo, 2004) presents for SOB for past several days acutely worsening last night. Associated with wheezing. Admitted for acute decompensated HF and hypertensive emergency.     Acute decompensated HF   - CXR shows bibasilar and perihilar opacities suggestive of edema with pro-BNP 9281  - He is now almost euvolemic on exam  - Switch IV Lasix to PO today  - Please continue to maintain strict I/Os, monitor daily weights, Cr, and K.    Atrial Fibrillation  - EKG showed sinus tachy, PACs,  RBBB, unchanged from previous.  He remains in regular rhythm, rate now normalized   - Continue BB  - Monitor and replete lytes, keep K>4, Mg>2  - Previous ECHO on 5/2019 shows EF 55%, mild-mod mitral valvular disease noted    Hypertensive Urgency   - Improved but still elevated at systolic of 160's.  Increase Hydralazine to q6H; titrate as needed  - May add Imdur if needed for better BP control  - Continue home Toprol XL 50mg PO qd  - Monitor routine hemodynamics    DVT ppx  - Per Primary    -Other cardiovascular workup will depend on clinical course.  He follows up with Dr. Pringle ([place])  -All other workup per primary team  -Will follow     [facility]  Cardiology   Spectra #[phone]

## 2019-08-27 LAB
CULTURE RESULTS: SIGNIFICANT CHANGE UP
CULTURE RESULTS: SIGNIFICANT CHANGE UP
SPECIMEN SOURCE: SIGNIFICANT CHANGE UP
SPECIMEN SOURCE: SIGNIFICANT CHANGE UP

## 2019-09-03 RX ORDER — PANTOPRAZOLE 40 MG/1
40 TABLET, DELAYED RELEASE ORAL
Refills: 0 | Status: ACTIVE | COMMUNITY

## 2019-09-03 RX ORDER — METOPROLOL TARTRATE 25 MG/1
25 TABLET, FILM COATED ORAL TWICE DAILY
Refills: 0 | Status: DISCONTINUED | COMMUNITY
End: 2019-09-03

## 2019-09-03 RX ORDER — HYDRALAZINE HYDROCHLORIDE 50 MG/1
50 TABLET ORAL 3 TIMES DAILY
Refills: 0 | Status: ACTIVE | COMMUNITY
Start: 2019-09-03

## 2019-09-03 RX ORDER — FUROSEMIDE 40 MG/1
40 TABLET ORAL DAILY
Refills: 0 | Status: ACTIVE | COMMUNITY
Start: 2019-09-03

## 2019-09-03 RX ORDER — METOPROLOL SUCCINATE 50 MG/1
50 TABLET, EXTENDED RELEASE ORAL DAILY
Qty: 90 | Refills: 3 | Status: ACTIVE | COMMUNITY
Start: 2019-09-03

## 2019-09-03 RX ORDER — LEVOTHYROXINE SODIUM 0.07 MG/1
75 TABLET ORAL
Qty: 30 | Refills: 3 | Status: ACTIVE | COMMUNITY
Start: 2018-12-17

## 2019-09-11 RX ORDER — POTASSIUM CHLORIDE 1500 MG/1
20 TABLET, FILM COATED, EXTENDED RELEASE ORAL DAILY
Refills: 0 | Status: ACTIVE | COMMUNITY
Start: 2019-09-03

## 2019-09-11 RX ORDER — APIXABAN 2.5 MG/1
2.5 TABLET, FILM COATED ORAL
Qty: 180 | Refills: 3 | Status: ACTIVE | COMMUNITY
Start: 2019-09-11

## 2019-12-10 ENCOUNTER — APPOINTMENT (OUTPATIENT)
Dept: ELECTROPHYSIOLOGY | Facility: CLINIC | Age: 84
End: 2019-12-10

## 2020-05-12 PROBLEM — K21.9 GASTRO-ESOPHAGEAL REFLUX DISEASE WITHOUT ESOPHAGITIS: Chronic | Status: ACTIVE | Noted: 2019-08-22

## 2020-05-12 PROBLEM — C85.90 NON-HODGKIN LYMPHOMA, UNSPECIFIED, UNSPECIFIED SITE: Chronic | Status: ACTIVE | Noted: 2019-08-22

## 2020-05-13 ENCOUNTER — OUTPATIENT (OUTPATIENT)
Dept: OUTPATIENT SERVICES | Facility: HOSPITAL | Age: 85
LOS: 1 days | Discharge: HOME | End: 2020-05-13
Payer: MEDICARE

## 2020-05-13 DIAGNOSIS — C85.88 OTHER SPECIFIED TYPES OF NON-HODGKIN LYMPHOMA, LYMPH NODES OF MULTIPLE SITES: ICD-10-CM

## 2020-05-14 ENCOUNTER — APPOINTMENT (OUTPATIENT)
Dept: HEMATOLOGY ONCOLOGY | Facility: CLINIC | Age: 85
End: 2020-05-14
Payer: MEDICARE

## 2020-05-14 PROCEDURE — 99203 OFFICE O/P NEW LOW 30 MIN: CPT | Mod: 95

## 2020-05-14 NOTE — HISTORY OF PRESENT ILLNESS
[Home] : at home, [unfilled] , at the time of the visit. [Family Member] : family member [Other Location: e.g. Home (Enter Location, City,State)___] : at [unfilled] [Patient] : the patient [Self] : self [de-identified] : History of CHF, under care of Dr. Darius Pringle, cardiologist in [place].\par \par PET confirms left inguinal adenopathy, as well as 2 PET-avid liver lesions [de-identified] : This 89 y/o male developed left inguinal adenopathy with cutaneous raised erythematous satellite lesions, and underwent a biopsy on 4/15 which showed diffuse large B cell NHL.he was seen by Dr. Austin from [facility] who recommended RCEOP. \par Christian was treated in 2006 for a lymphoma which arose on his back, and achieved complete remission with chemotherapy (Dr. Chanel Garcia was the oncologist - [facility] - we'll try to find the old records).\par Now comes to [facility] due to insurance issues.\par \par \par

## 2020-05-14 NOTE — PHYSICAL EXAM
[Fully active, able to carry on all pre-disease performance without restriction] : Status 0 - Fully active, able to carry on all pre-disease performance without restriction [Normal] : supple without JVD, no thyromegaly or masses appreciated [de-identified] : alert, understands all discussion [de-identified] : 4 cm left inguinal adenopathy [de-identified] : Plaque-like cutaneous b cell lymphoma in left groin skin

## 2020-05-14 NOTE — ASSESSMENT
[FreeTextEntry1] : DLBCL, stage 4, involving left inguinal nodes and skin, and liver, in a well-preserved 89 y/o with history of CHF.\par Will proceed with mini-RCEOP after infusiport insertion.\par \par 30 minutes spent reviewing history, assessing current clinical status and planning future care.

## 2020-05-14 NOTE — REVIEW OF SYSTEMS
[SOB on Exertion] : shortness of breath during exertion [Swollen Glands] : swollen glands [FreeTextEntry6] : mild [Negative] : Gastrointestinal [de-identified] : chronic diminished sensation left leg, unchanged [de-identified] : discomfort in left groin at site of adenopathy [de-identified] : Lymphoma is infiltrating skin in the left groin adjacent to paula mass

## 2020-05-15 ENCOUNTER — RESULT REVIEW (OUTPATIENT)
Age: 85
End: 2020-05-15

## 2020-05-15 ENCOUNTER — APPOINTMENT (OUTPATIENT)
Dept: HEMATOLOGY ONCOLOGY | Facility: CLINIC | Age: 85
End: 2020-05-15

## 2020-05-15 VITALS — WEIGHT: 142.19 LBS | BODY MASS INDEX: 25.2 KG/M2 | HEIGHT: 63 IN

## 2020-05-15 LAB
ANISOCYTOSIS BLD QL: SLIGHT — SIGNIFICANT CHANGE UP
BASOPHILS # BLD AUTO: 0 K/UL — SIGNIFICANT CHANGE UP (ref 0–0.2)
BASOPHILS NFR BLD AUTO: 1 % — SIGNIFICANT CHANGE UP (ref 0–2)
EOSINOPHIL # BLD AUTO: 0.2 K/UL — SIGNIFICANT CHANGE UP (ref 0–0.5)
EOSINOPHIL NFR BLD AUTO: 3 % — SIGNIFICANT CHANGE UP (ref 0–6)
HCT VFR BLD CALC: 40.2 % — SIGNIFICANT CHANGE UP (ref 39–50)
HGB BLD-MCNC: 13.1 G/DL — SIGNIFICANT CHANGE UP (ref 13–17)
LG PLATELETS BLD QL AUTO: SLIGHT — SIGNIFICANT CHANGE UP
LYMPHOCYTES # BLD AUTO: 2 K/UL — SIGNIFICANT CHANGE UP (ref 1–3.3)
LYMPHOCYTES # BLD AUTO: 21 % — SIGNIFICANT CHANGE UP (ref 13–44)
MACROCYTES BLD QL: SLIGHT — SIGNIFICANT CHANGE UP
MCHC RBC-ENTMCNC: 29.4 PG — SIGNIFICANT CHANGE UP (ref 27–34)
MCHC RBC-ENTMCNC: 32.5 G/DL — SIGNIFICANT CHANGE UP (ref 32–36)
MCV RBC AUTO: 90.5 FL — SIGNIFICANT CHANGE UP (ref 80–100)
MICROCYTES BLD QL: SLIGHT — SIGNIFICANT CHANGE UP
MONOCYTES # BLD AUTO: 1 K/UL — HIGH (ref 0–0.9)
MONOCYTES NFR BLD AUTO: 15 % — HIGH (ref 2–14)
NEUTROPHILS # BLD AUTO: 6.6 K/UL — SIGNIFICANT CHANGE UP (ref 1.8–7.4)
NEUTROPHILS NFR BLD AUTO: 60 % — SIGNIFICANT CHANGE UP (ref 43–77)
PLAT MORPH BLD: NORMAL — SIGNIFICANT CHANGE UP
PLATELET # BLD AUTO: 184 K/UL — SIGNIFICANT CHANGE UP (ref 150–400)
POIKILOCYTOSIS BLD QL AUTO: SLIGHT — SIGNIFICANT CHANGE UP
RBC # BLD: 4.44 M/UL — SIGNIFICANT CHANGE UP (ref 4.2–5.8)
RBC # FLD: 13.3 % — SIGNIFICANT CHANGE UP (ref 10.3–14.5)
RBC BLD AUTO: NORMAL — SIGNIFICANT CHANGE UP
WBC # BLD: 10.2 K/UL — SIGNIFICANT CHANGE UP (ref 3.8–10.5)
WBC # FLD AUTO: 10.2 K/UL — SIGNIFICANT CHANGE UP (ref 3.8–10.5)

## 2020-05-15 PROCEDURE — 93010 ELECTROCARDIOGRAM REPORT: CPT

## 2020-05-17 LAB
ALBUMIN SERPL ELPH-MCNC: 4.2 G/DL
ALP BLD-CCNC: 65 U/L
ALT SERPL-CCNC: 13 U/L
ANION GAP SERPL CALC-SCNC: 11 MMOL/L
APTT BLD: 34.9 SEC
AST SERPL-CCNC: 17 U/L
BILIRUB SERPL-MCNC: 0.4 MG/DL
BUN SERPL-MCNC: 31 MG/DL
CALCIUM SERPL-MCNC: 9.7 MG/DL
CHLORIDE SERPL-SCNC: 100 MMOL/L
CO2 SERPL-SCNC: 32 MMOL/L
CREAT SERPL-MCNC: 1.69 MG/DL
GLUCOSE SERPL-MCNC: 131 MG/DL
HBV CORE IGG+IGM SER QL: NONREACTIVE
HBV SURFACE AB SER QL: NONREACTIVE
HBV SURFACE AG SER QL: NONREACTIVE
HCV AB SER QL: NONREACTIVE
HCV S/CO RATIO: 0.17 S/CO
INR PPP: 1.13 RATIO
POTASSIUM SERPL-SCNC: 4.4 MMOL/L
PROT SERPL-MCNC: 6.7 G/DL
PT BLD: 12.8 SEC
SODIUM SERPL-SCNC: 143 MMOL/L

## 2020-05-19 ENCOUNTER — RESULT REVIEW (OUTPATIENT)
Age: 85
End: 2020-05-19

## 2020-05-19 PROCEDURE — 88321 CONSLTJ&REPRT SLD PREP ELSWR: CPT

## 2020-05-21 RX ORDER — ONDANSETRON 8 MG/1
8 TABLET ORAL EVERY 8 HOURS
Qty: 90 | Refills: 0 | Status: ACTIVE | COMMUNITY
Start: 2020-05-21 | End: 1900-01-01

## 2020-05-21 RX ORDER — ETOPOSIDE 50 MG/1
50 CAPSULE ORAL DAILY
Qty: 6 | Refills: 0 | Status: ACTIVE | COMMUNITY
Start: 2020-05-21 | End: 1900-01-01

## 2020-05-21 RX ORDER — PROCHLORPERAZINE MALEATE 10 MG/1
10 TABLET ORAL EVERY 6 HOURS
Qty: 120 | Refills: 0 | Status: ACTIVE | COMMUNITY
Start: 2020-05-21 | End: 1900-01-01

## 2020-05-22 ENCOUNTER — APPOINTMENT (OUTPATIENT)
Dept: INTERVENTIONAL RADIOLOGY/VASCULAR | Facility: CLINIC | Age: 85
End: 2020-05-22
Payer: MEDICARE

## 2020-05-22 ENCOUNTER — RESULT REVIEW (OUTPATIENT)
Age: 85
End: 2020-05-22

## 2020-05-22 ENCOUNTER — OUTPATIENT (OUTPATIENT)
Dept: OUTPATIENT SERVICES | Facility: HOSPITAL | Age: 85
LOS: 1 days | End: 2020-05-22

## 2020-05-22 DIAGNOSIS — C83.30 DIFFUSE LARGE B-CELL LYMPHOMA, UNSPECIFIED SITE: ICD-10-CM

## 2020-05-22 PROCEDURE — 77001 FLUOROGUIDE FOR VEIN DEVICE: CPT | Mod: 26

## 2020-05-22 PROCEDURE — 76937 US GUIDE VASCULAR ACCESS: CPT | Mod: 26

## 2020-05-22 PROCEDURE — 36561 INSERT TUNNELED CV CATH: CPT

## 2020-05-26 RX ORDER — PREDNISONE 20 MG/1
20 TABLET ORAL
Qty: 25 | Refills: 5 | Status: ACTIVE | COMMUNITY
Start: 2020-05-26 | End: 1900-01-01

## 2020-05-27 ENCOUNTER — APPOINTMENT (OUTPATIENT)
Age: 85
End: 2020-05-27

## 2020-05-27 ENCOUNTER — RESULT REVIEW (OUTPATIENT)
Age: 85
End: 2020-05-27

## 2020-05-27 LAB
BASOPHILS # BLD AUTO: 0.1 K/UL — SIGNIFICANT CHANGE UP (ref 0–0.2)
BASOPHILS NFR BLD AUTO: 1.6 % — SIGNIFICANT CHANGE UP (ref 0–2)
EOSINOPHIL # BLD AUTO: 0.2 K/UL — SIGNIFICANT CHANGE UP (ref 0–0.5)
EOSINOPHIL NFR BLD AUTO: 2.6 % — SIGNIFICANT CHANGE UP (ref 0–6)
HCT VFR BLD CALC: 40.8 % — SIGNIFICANT CHANGE UP (ref 39–50)
HGB BLD-MCNC: 13.1 G/DL — SIGNIFICANT CHANGE UP (ref 13–17)
LYMPHOCYTES # BLD AUTO: 2.7 K/UL — SIGNIFICANT CHANGE UP (ref 1–3.3)
LYMPHOCYTES # BLD AUTO: 28.8 % — SIGNIFICANT CHANGE UP (ref 13–44)
MCHC RBC-ENTMCNC: 29.1 PG — SIGNIFICANT CHANGE UP (ref 27–34)
MCHC RBC-ENTMCNC: 32.2 G/DL — SIGNIFICANT CHANGE UP (ref 32–36)
MCV RBC AUTO: 90.6 FL — SIGNIFICANT CHANGE UP (ref 80–100)
MONOCYTES # BLD AUTO: 0.9 K/UL — SIGNIFICANT CHANGE UP (ref 0–0.9)
MONOCYTES NFR BLD AUTO: 9.4 % — SIGNIFICANT CHANGE UP (ref 2–14)
NEUTROPHILS # BLD AUTO: 5.3 K/UL — SIGNIFICANT CHANGE UP (ref 1.8–7.4)
NEUTROPHILS NFR BLD AUTO: 57.5 % — SIGNIFICANT CHANGE UP (ref 43–77)
PLATELET # BLD AUTO: 239 K/UL — SIGNIFICANT CHANGE UP (ref 150–400)
RBC # BLD: 4.5 M/UL — SIGNIFICANT CHANGE UP (ref 4.2–5.8)
RBC # FLD: 12.8 % — SIGNIFICANT CHANGE UP (ref 10.3–14.5)
WBC # BLD: 9.2 K/UL — SIGNIFICANT CHANGE UP (ref 3.8–10.5)
WBC # FLD AUTO: 9.2 K/UL — SIGNIFICANT CHANGE UP (ref 3.8–10.5)

## 2020-05-27 RX ORDER — METOPROLOL TARTRATE 50 MG
1 TABLET ORAL
Qty: 0 | Refills: 0 | DISCHARGE

## 2020-05-28 ENCOUNTER — APPOINTMENT (OUTPATIENT)
Dept: HEMATOLOGY ONCOLOGY | Facility: CLINIC | Age: 85
End: 2020-05-28

## 2020-05-28 DIAGNOSIS — R11.2 NAUSEA WITH VOMITING, UNSPECIFIED: ICD-10-CM

## 2020-05-28 DIAGNOSIS — C83.30 DIFFUSE LARGE B-CELL LYMPHOMA, UNSPECIFIED SITE: ICD-10-CM

## 2020-05-28 DIAGNOSIS — Z51.11 ENCOUNTER FOR ANTINEOPLASTIC CHEMOTHERAPY: ICD-10-CM

## 2020-05-29 ENCOUNTER — APPOINTMENT (OUTPATIENT)
Age: 85
End: 2020-05-29

## 2020-06-01 ENCOUNTER — APPOINTMENT (OUTPATIENT)
Age: 85
End: 2020-06-01

## 2020-06-01 DIAGNOSIS — Z51.89 ENCOUNTER FOR OTHER SPECIFIED AFTERCARE: ICD-10-CM

## 2020-06-11 ENCOUNTER — APPOINTMENT (OUTPATIENT)
Dept: HEMATOLOGY ONCOLOGY | Facility: CLINIC | Age: 85
End: 2020-06-11
Payer: MEDICARE

## 2020-06-11 PROCEDURE — 99212 OFFICE O/P EST SF 10 MIN: CPT | Mod: 95

## 2020-06-12 NOTE — HISTORY OF PRESENT ILLNESS
[Medical Office: ([facility])___] : at the medical office located in  [Home] : at home, [unfilled] , at the time of the visit. 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 Austin from [facility] who recommended RCEOP. \par Christian was treated in 2006 for a lymphoma which arose on his back, and achieved complete remission with chemotherapy (Dr. Chanel Garcia was the oncologist - [facility].\par Began chemotherapy with mini-RCEOP.\par \par \par  \par \par \par \par  [de-identified] : tolerated 1st cycle of mini-RCEOP very well, with major reduction in size of inguinal mass and satellite nodules.

## 2020-06-12 NOTE — ASSESSMENT
[FreeTextEntry1] : Diffuse large B cell NHL, with excellent early response to mini-RCEOP.\par \par 10 minutes spent reviewing history, tolerance to 1st chemo cycle, and planning future care.

## 2020-06-12 NOTE — PHYSICAL EXAM
[Restricted in physically strenuous activity but ambulatory and able to carry out work of a light or sedentary nature] : Status 1- Restricted in physically strenuous activity but ambulatory and able to carry out work of a light or sedentary nature, e.g., light house work, office work [Normal] : clear to auscultation bilaterally, no dullness, no wheezing [de-identified] : Spry, elderly male - doing very well

## 2020-06-15 ENCOUNTER — OUTPATIENT (OUTPATIENT)
Dept: OUTPATIENT SERVICES | Facility: HOSPITAL | Age: 85
LOS: 1 days | Discharge: HOME | End: 2020-06-15
Payer: MEDICARE

## 2020-06-15 ENCOUNTER — APPOINTMENT (OUTPATIENT)
Age: 85
End: 2020-06-15

## 2020-06-15 ENCOUNTER — OUTPATIENT (OUTPATIENT)
Dept: OUTPATIENT SERVICES | Facility: HOSPITAL | Age: 85
LOS: 1 days | Discharge: HOME | End: 2020-06-15

## 2020-06-15 DIAGNOSIS — C83.34 DIFFUSE LARGE B-CELL LYMPHOMA, LYMPH NODES OF AXILLA AND UPPER LIMB: ICD-10-CM

## 2020-06-15 DIAGNOSIS — C83.30 DIFFUSE LARGE B-CELL LYMPHOMA, UNSPECIFIED SITE: ICD-10-CM

## 2020-06-16 ENCOUNTER — LABORATORY RESULT (OUTPATIENT)
Age: 85
End: 2020-06-16

## 2020-06-16 ENCOUNTER — APPOINTMENT (OUTPATIENT)
Age: 85
End: 2020-06-16

## 2020-06-16 RX ORDER — ETOPOSIDE 50 MG/1
50 CAPSULE ORAL
Qty: 6 | Refills: 1 | Status: ACTIVE | COMMUNITY
Start: 2020-06-16 | End: 1900-01-01

## 2020-06-18 ENCOUNTER — APPOINTMENT (OUTPATIENT)
Age: 85
End: 2020-06-18

## 2020-06-24 DIAGNOSIS — Z00.00 ENCOUNTER FOR GENERAL ADULT MEDICAL EXAMINATION W/OUT ABNORMAL FINDINGS: ICD-10-CM

## 2020-06-25 ENCOUNTER — LABORATORY RESULT (OUTPATIENT)
Age: 85
End: 2020-06-25

## 2020-06-25 ENCOUNTER — APPOINTMENT (OUTPATIENT)
Dept: HEMATOLOGY ONCOLOGY | Facility: CLINIC | Age: 85
End: 2020-06-25
Payer: MEDICARE

## 2020-06-25 ENCOUNTER — RESULT REVIEW (OUTPATIENT)
Age: 85
End: 2020-06-25

## 2020-06-25 VITALS
HEART RATE: 116 BPM | DIASTOLIC BLOOD PRESSURE: 56 MMHG | OXYGEN SATURATION: 92 % | BODY MASS INDEX: 25.16 KG/M2 | WEIGHT: 142 LBS | HEIGHT: 63 IN | SYSTOLIC BLOOD PRESSURE: 100 MMHG

## 2020-06-25 LAB
ANISOCYTOSIS BLD QL: SLIGHT — SIGNIFICANT CHANGE UP
BASOPHILS # BLD AUTO: 0.2 K/UL — SIGNIFICANT CHANGE UP (ref 0–0.2)
BASOPHILS NFR BLD AUTO: 1 % — SIGNIFICANT CHANGE UP (ref 0–2)
ELLIPTOCYTES BLD QL SMEAR: SLIGHT — SIGNIFICANT CHANGE UP
GIANT PLATELETS BLD QL SMEAR: PRESENT — SIGNIFICANT CHANGE UP
HCT VFR BLD CALC: 36.8 % — LOW (ref 39–50)
HGB BLD-MCNC: 12 G/DL — LOW (ref 13–17)
LG PLATELETS BLD QL AUTO: SLIGHT — SIGNIFICANT CHANGE UP
LYMPHOCYTES # BLD AUTO: 1 K/UL — SIGNIFICANT CHANGE UP (ref 1–3.3)
LYMPHOCYTES # BLD AUTO: 3 % — LOW (ref 13–44)
MACROCYTES BLD QL: SLIGHT — SIGNIFICANT CHANGE UP
MCHC RBC-ENTMCNC: 29 PG — SIGNIFICANT CHANGE UP (ref 27–34)
MCHC RBC-ENTMCNC: 32.5 G/DL — SIGNIFICANT CHANGE UP (ref 32–36)
MCV RBC AUTO: 89 FL — SIGNIFICANT CHANGE UP (ref 80–100)
MICROCYTES BLD QL: SLIGHT — SIGNIFICANT CHANGE UP
MONOCYTES # BLD AUTO: 2.8 K/UL — HIGH (ref 0–0.9)
MONOCYTES NFR BLD AUTO: 11 % — SIGNIFICANT CHANGE UP (ref 2–14)
NEUTROPHILS # BLD AUTO: 23.7 K/UL — HIGH (ref 1.8–7.4)
NEUTROPHILS NFR BLD AUTO: 85 % — HIGH (ref 43–77)
OVALOCYTES BLD QL SMEAR: SLIGHT — SIGNIFICANT CHANGE UP
PLAT MORPH BLD: NORMAL — SIGNIFICANT CHANGE UP
PLATELET # BLD AUTO: 297 K/UL — SIGNIFICANT CHANGE UP (ref 150–400)
POIKILOCYTOSIS BLD QL AUTO: SLIGHT — SIGNIFICANT CHANGE UP
POLYCHROMASIA BLD QL SMEAR: SLIGHT — SIGNIFICANT CHANGE UP
RBC # BLD: 4.13 M/UL — LOW (ref 4.2–5.8)
RBC # FLD: 14.1 % — SIGNIFICANT CHANGE UP (ref 10.3–14.5)
RBC BLD AUTO: SIGNIFICANT CHANGE UP
SMUDGE CELLS # BLD: PRESENT — SIGNIFICANT CHANGE UP
STOMATOCYTES BLD QL SMEAR: PRESENT — SIGNIFICANT CHANGE UP
WBC # BLD: 27.7 K/UL — HIGH (ref 3.8–10.5)
WBC # FLD AUTO: 27.7 K/UL — HIGH (ref 3.8–10.5)

## 2020-06-25 PROCEDURE — 99214 OFFICE O/P EST MOD 30 MIN: CPT

## 2020-06-25 PROCEDURE — 93010 ELECTROCARDIOGRAM REPORT: CPT

## 2020-06-25 PROCEDURE — 99499A: CUSTOM | Mod: NC

## 2020-06-25 NOTE — PHYSICAL EXAM
[Ambulatory and capable of all self care but unable to carry out any work activities] : Status 2- Ambulatory and capable of all self care but unable to carry out any work activities. Up and about more than 50% of waking hours [Thin] : thin [Normal] : affect appropriate [de-identified] : tachycardia

## 2020-06-25 NOTE — HISTORY OF PRESENT ILLNESS
[de-identified] : Was asked by his daughter to evaluate Mr. Villalobos because he has had diarrhea for about 5 days, He is on Antibiotics for dental infection, and was supposed to have a dental extraction tonight.   . he was in today for Covid swabbing for his next chemotherapy appt. [de-identified] : This 89 y/o male developed left inguinal adenopathy with cutaneous raised erythematous satellite lesions, and underwent a biopsy on 4/15 which showed diffuse large B cell NHL.he was seen by Dr. Austin from [facility] who recommended RCEOP. \althea Gonzales was treated in 2006 for a lymphoma which arose on his back, and achieved complete remission with chemotherapy (Dr. Chanel Garcia was the oncologist - [facility].\althea Began chemotherapy with mini-RCEOP.

## 2020-06-25 NOTE — ASSESSMENT
[FreeTextEntry1] : 89 yo WM with Large cell lymphoma, S/P first mini RCEOP, with Onpro, who is currently on an Antibiotic for a dental infection, Chemo is on hold. he has new C/O diarrhea.  Given cup for C. Diff testing. CBC has elevated WBC, most likely from Onpro or dental infection.On exam, he is tachycardic around 116-120, BP is lower than before at 100/56. EKG done shows accelerated junctional rhythm with frequent PVCs and Incomplete RBBB. . I faxed over the EKG to his Cardiologist , who was not in, but his primary care MD reviewed EKG, and suggested that patient go to ER at [facility]. I spoke with his daughter Sarah who was going to call her sister Charis, to bring him to the ER at [place].\par Dr. Melara agrees with the plan.

## 2020-06-26 LAB
ALBUMIN SERPL ELPH-MCNC: 4.2 G/DL
ALP BLD-CCNC: 83 U/L
ALT SERPL-CCNC: 12 U/L
ANION GAP SERPL CALC-SCNC: 17 MMOL/L
AST SERPL-CCNC: 16 U/L
BILIRUB SERPL-MCNC: 0.4 MG/DL
BUN SERPL-MCNC: 36 MG/DL
CALCIUM SERPL-MCNC: 9.4 MG/DL
CHLORIDE SERPL-SCNC: 97 MMOL/L
CO2 SERPL-SCNC: 25 MMOL/L
CREAT SERPL-MCNC: 1.81 MG/DL
GLUCOSE SERPL-MCNC: 154 MG/DL
POTASSIUM SERPL-SCNC: 4.4 MMOL/L
PROT SERPL-MCNC: 6.6 G/DL
SODIUM SERPL-SCNC: 139 MMOL/L

## 2020-07-02 ENCOUNTER — APPOINTMENT (OUTPATIENT)
Age: 85
End: 2020-07-02

## 2020-07-06 ENCOUNTER — APPOINTMENT (OUTPATIENT)
Age: 85
End: 2020-07-06

## 2020-07-07 ENCOUNTER — APPOINTMENT (OUTPATIENT)
Age: 85
End: 2020-07-07

## 2020-07-09 ENCOUNTER — APPOINTMENT (OUTPATIENT)
Age: 85
End: 2020-07-09

## 2020-07-19 ENCOUNTER — OUTPATIENT (OUTPATIENT)
Dept: OUTPATIENT SERVICES | Facility: HOSPITAL | Age: 85
LOS: 1 days | Discharge: HOME | End: 2020-07-19

## 2020-07-19 DIAGNOSIS — C83.30 DIFFUSE LARGE B-CELL LYMPHOMA, UNSPECIFIED SITE: ICD-10-CM

## 2020-07-21 ENCOUNTER — APPOINTMENT (OUTPATIENT)
Age: 85
End: 2020-07-21

## 2020-07-22 ENCOUNTER — APPOINTMENT (OUTPATIENT)
Dept: HEMATOLOGY ONCOLOGY | Facility: CLINIC | Age: 85
End: 2020-07-22
Payer: MEDICARE

## 2020-07-22 ENCOUNTER — RESULT REVIEW (OUTPATIENT)
Age: 85
End: 2020-07-22

## 2020-07-22 VITALS
BODY MASS INDEX: 23.42 KG/M2 | HEART RATE: 69 BPM | DIASTOLIC BLOOD PRESSURE: 63 MMHG | WEIGHT: 132.2 LBS | SYSTOLIC BLOOD PRESSURE: 136 MMHG

## 2020-07-22 DIAGNOSIS — R19.7 DIARRHEA, UNSPECIFIED: ICD-10-CM

## 2020-07-22 LAB
ANISOCYTOSIS BLD QL: SLIGHT — SIGNIFICANT CHANGE UP
BASOPHILS # BLD AUTO: 0.1 K/UL — SIGNIFICANT CHANGE UP (ref 0–0.2)
BASOPHILS NFR BLD AUTO: 1 % — SIGNIFICANT CHANGE UP (ref 0–2)
ELLIPTOCYTES BLD QL SMEAR: SLIGHT — SIGNIFICANT CHANGE UP
EOSINOPHIL # BLD AUTO: 0.3 K/UL — SIGNIFICANT CHANGE UP (ref 0–0.5)
EOSINOPHIL NFR BLD AUTO: 3 % — SIGNIFICANT CHANGE UP (ref 0–6)
GIANT PLATELETS BLD QL SMEAR: PRESENT — SIGNIFICANT CHANGE UP
HCT VFR BLD CALC: 39.2 % — SIGNIFICANT CHANGE UP (ref 39–50)
HGB BLD-MCNC: 12.5 G/DL — LOW (ref 13–17)
LG PLATELETS BLD QL AUTO: SLIGHT — SIGNIFICANT CHANGE UP
LYMPHOCYTES # BLD AUTO: 2.1 K/UL — SIGNIFICANT CHANGE UP (ref 1–3.3)
LYMPHOCYTES # BLD AUTO: 25 % — SIGNIFICANT CHANGE UP (ref 13–44)
MACROCYTES BLD QL: SLIGHT — SIGNIFICANT CHANGE UP
MCHC RBC-ENTMCNC: 29.1 PG — SIGNIFICANT CHANGE UP (ref 27–34)
MCHC RBC-ENTMCNC: 31.9 G/DL — LOW (ref 32–36)
MCV RBC AUTO: 91.2 FL — SIGNIFICANT CHANGE UP (ref 80–100)
METAMYELOCYTES # FLD: 1 % — HIGH (ref 0–0)
MICROCYTES BLD QL: SLIGHT — SIGNIFICANT CHANGE UP
MONOCYTES # BLD AUTO: 1 K/UL — HIGH (ref 0–0.9)
MONOCYTES NFR BLD AUTO: 9 % — SIGNIFICANT CHANGE UP (ref 2–14)
NEUTROPHILS # BLD AUTO: 6.3 K/UL — SIGNIFICANT CHANGE UP (ref 1.8–7.4)
NEUTROPHILS NFR BLD AUTO: 60 % — SIGNIFICANT CHANGE UP (ref 43–77)
NEUTS BAND # BLD: 1 % — SIGNIFICANT CHANGE UP (ref 0–8)
OVALOCYTES BLD QL SMEAR: SLIGHT — SIGNIFICANT CHANGE UP
PLAT MORPH BLD: NORMAL — SIGNIFICANT CHANGE UP
PLATELET # BLD AUTO: 238 K/UL — SIGNIFICANT CHANGE UP (ref 150–400)
POIKILOCYTOSIS BLD QL AUTO: SLIGHT — SIGNIFICANT CHANGE UP
RBC # BLD: 4.29 M/UL — SIGNIFICANT CHANGE UP (ref 4.2–5.8)
RBC # FLD: 15.1 % — HIGH (ref 10.3–14.5)
RBC BLD AUTO: SIGNIFICANT CHANGE UP
ROULEAUX BLD QL SMEAR: PRESENT — SIGNIFICANT CHANGE UP
SMUDGE CELLS # BLD: PRESENT — SIGNIFICANT CHANGE UP
STOMATOCYTES BLD QL SMEAR: PRESENT — SIGNIFICANT CHANGE UP
WBC # BLD: 9.6 K/UL — SIGNIFICANT CHANGE UP (ref 3.8–10.5)
WBC # FLD AUTO: 9.6 K/UL — SIGNIFICANT CHANGE UP (ref 3.8–10.5)

## 2020-07-22 PROCEDURE — 99213 OFFICE O/P EST LOW 20 MIN: CPT

## 2020-07-23 ENCOUNTER — APPOINTMENT (OUTPATIENT)
Age: 85
End: 2020-07-23

## 2020-07-23 PROBLEM — R19.7 DIARRHEA: Status: ACTIVE | Noted: 2020-06-25

## 2020-07-23 LAB
ALBUMIN SERPL ELPH-MCNC: 4.4 G/DL
ALP BLD-CCNC: 70 U/L
ALT SERPL-CCNC: 12 U/L
ANION GAP SERPL CALC-SCNC: 18 MMOL/L
AST SERPL-CCNC: 16 U/L
BILIRUB SERPL-MCNC: 0.3 MG/DL
BUN SERPL-MCNC: 28 MG/DL
CALCIUM SERPL-MCNC: 9.4 MG/DL
CHLORIDE SERPL-SCNC: 99 MMOL/L
CO2 SERPL-SCNC: 27 MMOL/L
CREAT SERPL-MCNC: 1.42 MG/DL
GLUCOSE SERPL-MCNC: 119 MG/DL
POTASSIUM SERPL-SCNC: 4.3 MMOL/L
PROT SERPL-MCNC: 6.6 G/DL
SODIUM SERPL-SCNC: 143 MMOL/L

## 2020-07-23 NOTE — ASSESSMENT
[FreeTextEntry1] : Diffuse large B-cell lymphoma, status post 1 cycle of RCEOP, now interrupted by antibiotic induced C. difficile colitis, resolving but still causing 2 episodes of diarrhea per day.\par Chemotherapy will be held in this 89-year-old gentleman until the C. difficile colitis is under full control.

## 2020-07-23 NOTE — PHYSICAL EXAM
[Normal] : clear to auscultation bilaterally, no dullness, no wheezing [de-identified] : Spry, elderly male - doing very well

## 2020-07-23 NOTE — REVIEW OF SYSTEMS
[Fever] : no fever [Fatigue] : fatigue [Diarrhea] : diarrhea [FreeTextEntry7] : Diarrhea has been improving, now twice daily

## 2020-07-23 NOTE — HISTORY OF PRESENT ILLNESS
[de-identified] : \par \par This 89 y/o male developed left inguinal adenopathy with cutaneous raised erythematous satellite lesions, and underwent a biopsy on 4/15 which showed diffuse large B cell NHL.he was seen by Dr. Austin from [facility] who recommended RCEOP. \par Christian was treated in 2006 for a lymphoma which arose on his back, and achieved complete remission with chemotherapy (Dr. Chanel Garcia was the oncologist - [facility].\par Began chemotherapy with mini-RCEOP.\par \par \par  [de-identified] : \par Patient home from [facility], treated for dehydration and C. Diff. diarrhea which developed after antibiotic therapy was started for a dental abscess.\par No longer on antibiotics, still having 2 diarrheal bowel movements per day.

## 2020-07-28 ENCOUNTER — APPOINTMENT (OUTPATIENT)
Age: 85
End: 2020-07-28

## 2020-07-30 ENCOUNTER — APPOINTMENT (OUTPATIENT)
Age: 85
End: 2020-07-30

## 2020-08-05 ENCOUNTER — RESULT REVIEW (OUTPATIENT)
Age: 85
End: 2020-08-05

## 2020-08-05 ENCOUNTER — APPOINTMENT (OUTPATIENT)
Dept: HEMATOLOGY ONCOLOGY | Facility: CLINIC | Age: 85
End: 2020-08-05

## 2020-08-05 ENCOUNTER — LABORATORY RESULT (OUTPATIENT)
Age: 85
End: 2020-08-05

## 2020-08-05 ENCOUNTER — APPOINTMENT (OUTPATIENT)
Dept: HEMATOLOGY ONCOLOGY | Facility: CLINIC | Age: 85
End: 2020-08-05
Payer: MEDICARE

## 2020-08-05 VITALS
HEIGHT: 63 IN | HEART RATE: 68 BPM | SYSTOLIC BLOOD PRESSURE: 155 MMHG | DIASTOLIC BLOOD PRESSURE: 70 MMHG | OXYGEN SATURATION: 96 %

## 2020-08-05 LAB
ANISOCYTOSIS BLD QL: SLIGHT — SIGNIFICANT CHANGE UP
BASOPHILS # BLD AUTO: 0.1 K/UL — SIGNIFICANT CHANGE UP (ref 0–0.2)
BASOPHILS NFR BLD AUTO: 2 % — SIGNIFICANT CHANGE UP (ref 0–2)
ELLIPTOCYTES BLD QL SMEAR: SLIGHT — SIGNIFICANT CHANGE UP
EOSINOPHIL # BLD AUTO: 0.3 K/UL — SIGNIFICANT CHANGE UP (ref 0–0.5)
EOSINOPHIL NFR BLD AUTO: 3 % — SIGNIFICANT CHANGE UP (ref 0–6)
HCT VFR BLD CALC: 38.6 % — LOW (ref 39–50)
HGB BLD-MCNC: 12.6 G/DL — LOW (ref 13–17)
LG PLATELETS BLD QL AUTO: SLIGHT — SIGNIFICANT CHANGE UP
LYMPHOCYTES # BLD AUTO: 3.9 K/UL — HIGH (ref 1–3.3)
LYMPHOCYTES # BLD AUTO: 38 % — SIGNIFICANT CHANGE UP (ref 13–44)
MACROCYTES BLD QL: SLIGHT — SIGNIFICANT CHANGE UP
MCHC RBC-ENTMCNC: 29.1 PG — SIGNIFICANT CHANGE UP (ref 27–34)
MCHC RBC-ENTMCNC: 32.5 G/DL — SIGNIFICANT CHANGE UP (ref 32–36)
MCV RBC AUTO: 89.4 FL — SIGNIFICANT CHANGE UP (ref 80–100)
MICROCYTES BLD QL: SLIGHT — SIGNIFICANT CHANGE UP
MONOCYTES # BLD AUTO: 1 K/UL — HIGH (ref 0–0.9)
MONOCYTES NFR BLD AUTO: 8 % — SIGNIFICANT CHANGE UP (ref 2–14)
NEUTROPHILS # BLD AUTO: 5.2 K/UL — SIGNIFICANT CHANGE UP (ref 1.8–7.4)
NEUTROPHILS NFR BLD AUTO: 46 % — SIGNIFICANT CHANGE UP (ref 43–77)
OVALOCYTES BLD QL SMEAR: SLIGHT — SIGNIFICANT CHANGE UP
PLAT MORPH BLD: NORMAL — SIGNIFICANT CHANGE UP
PLATELET # BLD AUTO: 308 K/UL — SIGNIFICANT CHANGE UP (ref 150–400)
POIKILOCYTOSIS BLD QL AUTO: SLIGHT — SIGNIFICANT CHANGE UP
POLYCHROMASIA BLD QL SMEAR: SLIGHT — SIGNIFICANT CHANGE UP
RBC # BLD: 4.32 M/UL — SIGNIFICANT CHANGE UP (ref 4.2–5.8)
RBC # FLD: 14.3 % — SIGNIFICANT CHANGE UP (ref 10.3–14.5)
RBC BLD AUTO: SIGNIFICANT CHANGE UP
SCHISTOCYTES BLD QL AUTO: SLIGHT — SIGNIFICANT CHANGE UP
SMUDGE CELLS # BLD: PRESENT — SIGNIFICANT CHANGE UP
STOMATOCYTES BLD QL SMEAR: PRESENT — SIGNIFICANT CHANGE UP
VARIANT LYMPHS # BLD: 3 % — SIGNIFICANT CHANGE UP (ref 0–6)
WBC # BLD: 10.6 K/UL — HIGH (ref 3.8–10.5)
WBC # FLD AUTO: 10.6 K/UL — HIGH (ref 3.8–10.5)

## 2020-08-05 PROCEDURE — 99213 OFFICE O/P EST LOW 20 MIN: CPT

## 2020-08-05 NOTE — ASSESSMENT
[FreeTextEntry1] : Diffuse large B-cell lymphoma, status post 1 cycle of RCEOP, now interrupted by antibiotic induced C. difficile colitis, resolving but still causing 1 episode of diarrhea per day.\par \par -Resuming mini-RCEOP next week\par -Continue follow up with PCP in regards to diarrhea. Advised to call offie if symptoms worsen\par -F/U in 4 weeks\par

## 2020-08-05 NOTE — HISTORY OF PRESENT ILLNESS
[Treatment Protocol] : Treatment Protocol [de-identified] : Patient s/p cycle 1 mini-RCEOP. Patient recently had tooth extraction performed without any complications. Patient admits diarrhea is resolving, having 1 episode a day. Patient evaluated by PCP, if diarrhea worsens will order stool cultures. Patient states he feels well. Denies fever/chills,nausea, vomiting, constipation,  abdominal pain, bleeding, easy bruising or visual changes, chest pain,  SOB or HAMEED,  LE edema.\par  [de-identified] : This 89 y/o male developed left inguinal adenopathy with cutaneous raised erythematous satellite lesions, and underwent a biopsy on 4/15 which showed diffuse large B cell NHL.he was seen by Dr. Austin from [facility] who recommended RCEOP. \par Christian was treated in 2006 for a lymphoma which arose on his back, and achieved complete remission with chemotherapy (Dr. Chanel Garcia was the oncologist - [facility] - we'll try to find the old records).Now comes to [facility] due to insurance issues.\par \par Pathology 5/22/20\par -Diffuse large B cell lymphoma with high proliferation index, germinal center b-cell-like immunophenotype\par \par PET/CT 05/8/2020\par -Groin Left Greater right, soft tissue hypermetabolism consistent with underlying lymphoma\par -Left hepatic lobe 2 discreate hypermetabolic nodular foci most likely additional neoplastic in nature\par -small right eight rib focus indeterminate for post traumatic versus neoplastic involvement; attention on follow up imaging [FreeTextEntry1] : Mini-RCEOP x 6 cycles

## 2020-08-05 NOTE — REVIEW OF SYSTEMS
[Fatigue] : fatigue [Diarrhea] : diarrhea [Fever] : no fever [FreeTextEntry7] : Diarrhea has been improving, now once daily

## 2020-08-05 NOTE — PHYSICAL EXAM
[Normal] : RRR, normal S1S2, no murmurs, rubs, gallops [de-identified] : Spry, elderly male - doing very well

## 2020-08-11 ENCOUNTER — RESULT REVIEW (OUTPATIENT)
Age: 85
End: 2020-08-11

## 2020-08-11 ENCOUNTER — APPOINTMENT (OUTPATIENT)
Age: 85
End: 2020-08-11

## 2020-08-11 ENCOUNTER — LABORATORY RESULT (OUTPATIENT)
Age: 85
End: 2020-08-11

## 2020-08-11 LAB
ACANTHOCYTES BLD QL SMEAR: SLIGHT — SIGNIFICANT CHANGE UP
ANISOCYTOSIS BLD QL: SLIGHT — SIGNIFICANT CHANGE UP
BASOPHILS # BLD AUTO: 0.1 K/UL — SIGNIFICANT CHANGE UP (ref 0–0.2)
BASOPHILS NFR BLD AUTO: 1 % — SIGNIFICANT CHANGE UP (ref 0–2)
DACRYOCYTES BLD QL SMEAR: SLIGHT — SIGNIFICANT CHANGE UP
ELLIPTOCYTES BLD QL SMEAR: SLIGHT — SIGNIFICANT CHANGE UP
EOSINOPHIL # BLD AUTO: 0.2 K/UL — SIGNIFICANT CHANGE UP (ref 0–0.5)
EOSINOPHIL NFR BLD AUTO: 4 % — SIGNIFICANT CHANGE UP (ref 0–6)
HCT VFR BLD CALC: 36.5 % — LOW (ref 39–50)
HGB BLD-MCNC: 12.3 G/DL — LOW (ref 13–17)
LG PLATELETS BLD QL AUTO: SLIGHT — SIGNIFICANT CHANGE UP
LYMPHOCYTES # BLD AUTO: 2.2 K/UL — SIGNIFICANT CHANGE UP (ref 1–3.3)
LYMPHOCYTES # BLD AUTO: 23 % — SIGNIFICANT CHANGE UP (ref 13–44)
MCHC RBC-ENTMCNC: 29.5 PG — SIGNIFICANT CHANGE UP (ref 27–34)
MCHC RBC-ENTMCNC: 33.8 G/DL — SIGNIFICANT CHANGE UP (ref 32–36)
MCV RBC AUTO: 87.3 FL — SIGNIFICANT CHANGE UP (ref 80–100)
MONOCYTES # BLD AUTO: 1.2 K/UL — HIGH (ref 0–0.9)
MONOCYTES NFR BLD AUTO: 8 % — SIGNIFICANT CHANGE UP (ref 2–14)
NEUTROPHILS # BLD AUTO: 5.5 K/UL — SIGNIFICANT CHANGE UP (ref 1.8–7.4)
NEUTROPHILS NFR BLD AUTO: 63 % — SIGNIFICANT CHANGE UP (ref 43–77)
OVALOCYTES BLD QL SMEAR: SLIGHT — SIGNIFICANT CHANGE UP
PLAT MORPH BLD: NORMAL — SIGNIFICANT CHANGE UP
PLATELET # BLD AUTO: 206 K/UL — SIGNIFICANT CHANGE UP (ref 150–400)
POIKILOCYTOSIS BLD QL AUTO: SLIGHT — SIGNIFICANT CHANGE UP
POLYCHROMASIA BLD QL SMEAR: SLIGHT — SIGNIFICANT CHANGE UP
RBC # BLD: 4.18 M/UL — LOW (ref 4.2–5.8)
RBC # FLD: 14 % — SIGNIFICANT CHANGE UP (ref 10.3–14.5)
RBC BLD AUTO: SIGNIFICANT CHANGE UP
SCHISTOCYTES BLD QL AUTO: SLIGHT — SIGNIFICANT CHANGE UP
SMUDGE CELLS # BLD: PRESENT — SIGNIFICANT CHANGE UP
VARIANT LYMPHS # BLD: 1 % — SIGNIFICANT CHANGE UP (ref 0–6)
WBC # BLD: 9.1 K/UL — SIGNIFICANT CHANGE UP (ref 3.8–10.5)
WBC # FLD AUTO: 9.1 K/UL — SIGNIFICANT CHANGE UP (ref 3.8–10.5)

## 2020-08-11 RX ORDER — LEVOTHYROXINE SODIUM 125 MCG
1 TABLET ORAL
Qty: 0 | Refills: 0 | DISCHARGE

## 2020-08-12 DIAGNOSIS — Z51.11 ENCOUNTER FOR ANTINEOPLASTIC CHEMOTHERAPY: ICD-10-CM

## 2020-08-12 DIAGNOSIS — R11.2 NAUSEA WITH VOMITING, UNSPECIFIED: ICD-10-CM

## 2020-08-13 ENCOUNTER — APPOINTMENT (OUTPATIENT)
Age: 85
End: 2020-08-13

## 2020-08-14 DIAGNOSIS — Z51.89 ENCOUNTER FOR OTHER SPECIFIED AFTERCARE: ICD-10-CM

## 2020-08-14 NOTE — ED PROVIDER NOTE - OBJECTIVE STATEMENT
Jeremiah Mederos(Attending)
pmd - cristi mcnally  pt c/o worsening sob, with productive cough x weeks. no fever, chills, ha, cp, abd pain, d/n/v, Pt and family relates b/l le edema, no change form baseline.

## 2020-08-21 ENCOUNTER — INPATIENT (INPATIENT)
Facility: HOSPITAL | Age: 85
LOS: 4 days | Discharge: HOME | DRG: 372 | End: 2020-08-26
Attending: INTERNAL MEDICINE | Admitting: INTERNAL MEDICINE
Payer: MEDICARE

## 2020-08-21 VITALS
HEIGHT: 63 IN | RESPIRATION RATE: 20 BRPM | TEMPERATURE: 98 F | SYSTOLIC BLOOD PRESSURE: 113 MMHG | OXYGEN SATURATION: 94 % | HEART RATE: 95 BPM | WEIGHT: 136.03 LBS | DIASTOLIC BLOOD PRESSURE: 66 MMHG

## 2020-08-21 LAB
ALBUMIN SERPL ELPH-MCNC: 3.6 G/DL — SIGNIFICANT CHANGE UP (ref 3.3–5.2)
ALP SERPL-CCNC: 87 U/L — SIGNIFICANT CHANGE UP (ref 40–120)
ALT FLD-CCNC: 12 U/L — SIGNIFICANT CHANGE UP
ANION GAP SERPL CALC-SCNC: 17 MMOL/L — SIGNIFICANT CHANGE UP (ref 5–17)
AST SERPL-CCNC: 13 U/L — SIGNIFICANT CHANGE UP
BASOPHILS # BLD AUTO: 0 K/UL — SIGNIFICANT CHANGE UP (ref 0–0.2)
BASOPHILS NFR BLD AUTO: 0 % — SIGNIFICANT CHANGE UP (ref 0–2)
BILIRUB SERPL-MCNC: 0.7 MG/DL — SIGNIFICANT CHANGE UP (ref 0.4–2)
BUN SERPL-MCNC: 40 MG/DL — HIGH (ref 8–20)
CALCIUM SERPL-MCNC: 8.8 MG/DL — SIGNIFICANT CHANGE UP (ref 8.6–10.2)
CHLORIDE SERPL-SCNC: 92 MMOL/L — LOW (ref 98–107)
CO2 SERPL-SCNC: 31 MMOL/L — HIGH (ref 22–29)
CREAT SERPL-MCNC: 1.94 MG/DL — HIGH (ref 0.5–1.3)
EOSINOPHIL # BLD AUTO: 0 K/UL — SIGNIFICANT CHANGE UP (ref 0–0.5)
EOSINOPHIL NFR BLD AUTO: 0 % — SIGNIFICANT CHANGE UP (ref 0–6)
GIANT PLATELETS BLD QL SMEAR: PRESENT — SIGNIFICANT CHANGE UP
GLUCOSE SERPL-MCNC: 121 MG/DL — HIGH (ref 70–99)
HCT VFR BLD CALC: 36.8 % — LOW (ref 39–50)
HGB BLD-MCNC: 11.4 G/DL — LOW (ref 13–17)
LACTATE BLDV-MCNC: 1.5 MMOL/L — SIGNIFICANT CHANGE UP (ref 0.5–2)
LIDOCAIN IGE QN: 5 U/L — LOW (ref 22–51)
LYMPHOCYTES # BLD AUTO: 0.62 K/UL — LOW (ref 1–3.3)
LYMPHOCYTES # BLD AUTO: 5.3 % — LOW (ref 13–44)
MACROCYTES BLD QL: SLIGHT — SIGNIFICANT CHANGE UP
MANUAL SMEAR VERIFICATION: SIGNIFICANT CHANGE UP
MCHC RBC-ENTMCNC: 28.8 PG — SIGNIFICANT CHANGE UP (ref 27–34)
MCHC RBC-ENTMCNC: 31 GM/DL — LOW (ref 32–36)
MCV RBC AUTO: 92.9 FL — SIGNIFICANT CHANGE UP (ref 80–100)
METAMYELOCYTES # FLD: 0.9 % — HIGH (ref 0–0)
MICROCYTES BLD QL: SLIGHT — SIGNIFICANT CHANGE UP
MONOCYTES # BLD AUTO: 1.43 K/UL — HIGH (ref 0–0.9)
MONOCYTES NFR BLD AUTO: 12.3 % — SIGNIFICANT CHANGE UP (ref 2–14)
NEUTROPHILS # BLD AUTO: 9.49 K/UL — HIGH (ref 1.8–7.4)
NEUTROPHILS NFR BLD AUTO: 78.9 % — HIGH (ref 43–77)
NEUTS BAND # BLD: 2.6 % — SIGNIFICANT CHANGE UP (ref 0–8)
NT-PROBNP SERPL-SCNC: 4131 PG/ML — HIGH (ref 0–300)
OVALOCYTES BLD QL SMEAR: SLIGHT — SIGNIFICANT CHANGE UP
PLAT MORPH BLD: NORMAL — SIGNIFICANT CHANGE UP
PLATELET # BLD AUTO: 113 K/UL — LOW (ref 150–400)
POIKILOCYTOSIS BLD QL AUTO: SLIGHT — SIGNIFICANT CHANGE UP
POLYCHROMASIA BLD QL SMEAR: SLIGHT — SIGNIFICANT CHANGE UP
POTASSIUM SERPL-MCNC: 4.5 MMOL/L — SIGNIFICANT CHANGE UP (ref 3.5–5.3)
POTASSIUM SERPL-SCNC: 4.5 MMOL/L — SIGNIFICANT CHANGE UP (ref 3.5–5.3)
PROT SERPL-MCNC: 6 G/DL — LOW (ref 6.6–8.7)
RBC # BLD: 3.96 M/UL — LOW (ref 4.2–5.8)
RBC # FLD: 15.1 % — HIGH (ref 10.3–14.5)
RBC BLD AUTO: ABNORMAL
SODIUM SERPL-SCNC: 139 MMOL/L — SIGNIFICANT CHANGE UP (ref 135–145)
TROPONIN T SERPL-MCNC: <0.01 NG/ML — SIGNIFICANT CHANGE UP (ref 0–0.06)
WBC # BLD: 11.64 K/UL — HIGH (ref 3.8–10.5)
WBC # FLD AUTO: 11.64 K/UL — HIGH (ref 3.8–10.5)

## 2020-08-21 PROCEDURE — 71045 X-RAY EXAM CHEST 1 VIEW: CPT | Mod: 26

## 2020-08-21 PROCEDURE — 99236 HOSP IP/OBS SAME DATE HI 85: CPT

## 2020-08-21 RX ORDER — IOHEXOL 300 MG/ML
30 INJECTION, SOLUTION INTRAVENOUS ONCE
Refills: 0 | Status: COMPLETED | OUTPATIENT
Start: 2020-08-21 | End: 2020-08-21

## 2020-08-21 RX ORDER — SODIUM CHLORIDE 9 MG/ML
500 INJECTION INTRAMUSCULAR; INTRAVENOUS; SUBCUTANEOUS ONCE
Refills: 0 | Status: COMPLETED | OUTPATIENT
Start: 2020-08-21 | End: 2020-08-21

## 2020-08-21 RX ADMIN — SODIUM CHLORIDE 500 MILLILITER(S): 9 INJECTION INTRAMUSCULAR; INTRAVENOUS; SUBCUTANEOUS at 18:04

## 2020-08-21 RX ADMIN — IOHEXOL 30 MILLILITER(S): 300 INJECTION, SOLUTION INTRAVENOUS at 17:33

## 2020-08-21 NOTE — ED PROVIDER NOTE - OBJECTIVE STATEMENT
89yoM; pmh of Hodgkin's disease, C-Diff and Dementia p/w daughter c/o worsening diarrhea onset 2 days ago. Associated w/ nausea. Daughter states that 1.5 months ago, pt had C-diff, and was placed on 2 abx, which he finished 1 month ago. Pt cleared to have chemo treatment for hodgkin's lymphoma last week, but states his diarrhea worsened 2 days ago. Daughter states he had about 10 episodes of diarrhea this past week, which are yellow and malodorous. Pt is currently on Rituxan, Cytoxan Etoposide and Oncovin. Pt went to his PMD, and was prompted to come to the ED because he was found to be hypotensive and have low SpO2.   pmh: C-diff, Hodgkin's Lymphoma, Dementia  SOCIAL: No tobacco/illicit substance use/socialEtOH 89yoM; pmh of NHL (currently w/chemo), C-Diff(treated 1 month ago) and Dementia; p/w daughter c/o worsening diarrhea onset 2 days ago, Associated w/ nausea. Daughter states that 1.5 months ago, pt had C-diff, and was placed on 2 abx, which he finished 1 month ago. Pt cleared to have chemo treatment for NHL last week, but states his diarrhea worsened 1 day ago after receiving chemo 2 days ago. Pt is currently on Rituxan, Cytoxan Etoposide and Oncovin for chemo which he received .  Daughter states he had about 10 episodes of diarrhea this 24 hours, which are yellow and malodorous.  Pt went to his PMD, and was prompted to come to the ED because he was found to be hypotensive and have low SpO2, send to ed for furt her eval  pmh: C-diff, NHL, Dementia  SOCIAL: No tobacco/illicit substance use/socialEtOH

## 2020-08-21 NOTE — ED PROVIDER NOTE - PMH
GERD (gastroesophageal reflux disease)    HTN (hypertension)    Hypothyroidism    Non-Hodgkin's lymphoma  Left back s/p resection and chemo  approx 2004  Skin cancer  unknown type s/p chemo 10-15 years ago, s/p resection

## 2020-08-21 NOTE — ED PROVIDER NOTE - NS ED ROS FT
Constitutional: (-) fever  (-)chills  (-)sweats  Eyes/ENT: (-) blurry vision, (-) epistaxis  (-)rhinorrhea   (-) sore throat    Cardiovascular: (-) chest pain, (-) palpitations (-) edema   Respiratory: (-) cough, (-) shortness of breath   Gastrointestinal: (+)nausea  (-)vomiting, (+) diarrhea  (-) abdominal pain   :  (-)dysuria, (-)frequency, (-)urgency, (-)hematuria  Musculoskeletal: (-) neck pain, (-) back pain, (-) joint pain  Integumentary: (-) rash, (-) edema  Neurological: (-) headache, (-) altered mental status  (-)LOC

## 2020-08-21 NOTE — ED PROVIDER NOTE - PROGRESS NOTE DETAILS
PT evaluated by intake physician. HPI/PE/ROS as noted above. Will follow up plan per intake physician. Pt with no complaints at this time. No TTP. Awaiting CT results. JAMILA Martinez: Received sign out from JAMILA Gentile pending CT abd/pel. CT showing pancolitis consider cdiff. Unable to obtain stool specimen at this time. Results d/w pt and pts daughter Sarah ([phone]). Pt's daughter unsure of what medication pt was initially treated with for cdiff but can find out tomorrow. Given pt is on multiple chemo agents and is immune compromised will keep pt in obs for abx and rpt labs in AM. Plan discussed at length with pt's daughter who is agreeable. Pt resting comfortably at this time, NAD, VSS, abdomen soft, non-tender.

## 2020-08-22 DIAGNOSIS — K51.00 ULCERATIVE (CHRONIC) PANCOLITIS WITHOUT COMPLICATIONS: ICD-10-CM

## 2020-08-22 LAB
ALBUMIN SERPL ELPH-MCNC: 3 G/DL — LOW (ref 3.3–5.2)
ALP SERPL-CCNC: 78 U/L — SIGNIFICANT CHANGE UP (ref 40–120)
ALT FLD-CCNC: 10 U/L — SIGNIFICANT CHANGE UP
ANION GAP SERPL CALC-SCNC: 16 MMOL/L — SIGNIFICANT CHANGE UP (ref 5–17)
AST SERPL-CCNC: 11 U/L — SIGNIFICANT CHANGE UP
BASOPHILS # BLD AUTO: 0.02 K/UL — SIGNIFICANT CHANGE UP (ref 0–0.2)
BASOPHILS NFR BLD AUTO: 0.1 % — SIGNIFICANT CHANGE UP (ref 0–2)
BILIRUB SERPL-MCNC: 0.4 MG/DL — SIGNIFICANT CHANGE UP (ref 0.4–2)
BUN SERPL-MCNC: 34 MG/DL — HIGH (ref 8–20)
C DIFF BY PCR RESULT: DETECTED
C DIFF TOX GENS STL QL NAA+PROBE: SIGNIFICANT CHANGE UP
CALCIUM SERPL-MCNC: 8.1 MG/DL — LOW (ref 8.6–10.2)
CHLORIDE SERPL-SCNC: 95 MMOL/L — LOW (ref 98–107)
CO2 SERPL-SCNC: 28 MMOL/L — SIGNIFICANT CHANGE UP (ref 22–29)
CREAT SERPL-MCNC: 1.55 MG/DL — HIGH (ref 0.5–1.3)
EOSINOPHIL # BLD AUTO: 0.1 K/UL — SIGNIFICANT CHANGE UP (ref 0–0.5)
EOSINOPHIL NFR BLD AUTO: 0.7 % — SIGNIFICANT CHANGE UP (ref 0–6)
GLUCOSE SERPL-MCNC: 79 MG/DL — SIGNIFICANT CHANGE UP (ref 70–99)
HCT VFR BLD CALC: 33.3 % — LOW (ref 39–50)
HGB BLD-MCNC: 10.6 G/DL — LOW (ref 13–17)
IMM GRANULOCYTES NFR BLD AUTO: 7 % — HIGH (ref 0–1.5)
LYMPHOCYTES # BLD AUTO: 1.04 K/UL — SIGNIFICANT CHANGE UP (ref 1–3.3)
LYMPHOCYTES # BLD AUTO: 7.6 % — LOW (ref 13–44)
MCHC RBC-ENTMCNC: 29 PG — SIGNIFICANT CHANGE UP (ref 27–34)
MCHC RBC-ENTMCNC: 31.8 GM/DL — LOW (ref 32–36)
MCV RBC AUTO: 91.2 FL — SIGNIFICANT CHANGE UP (ref 80–100)
MONOCYTES # BLD AUTO: 1.48 K/UL — HIGH (ref 0–0.9)
MONOCYTES NFR BLD AUTO: 10.8 % — SIGNIFICANT CHANGE UP (ref 2–14)
NEUTROPHILS # BLD AUTO: 10.06 K/UL — HIGH (ref 1.8–7.4)
NEUTROPHILS NFR BLD AUTO: 73.8 % — SIGNIFICANT CHANGE UP (ref 43–77)
PLATELET # BLD AUTO: 109 K/UL — LOW (ref 150–400)
POTASSIUM SERPL-MCNC: 3.5 MMOL/L — SIGNIFICANT CHANGE UP (ref 3.5–5.3)
POTASSIUM SERPL-SCNC: 3.5 MMOL/L — SIGNIFICANT CHANGE UP (ref 3.5–5.3)
PROT SERPL-MCNC: 5.1 G/DL — LOW (ref 6.6–8.7)
RBC # BLD: 3.65 M/UL — LOW (ref 4.2–5.8)
RBC # FLD: 14.8 % — HIGH (ref 10.3–14.5)
SODIUM SERPL-SCNC: 139 MMOL/L — SIGNIFICANT CHANGE UP (ref 135–145)
WBC # BLD: 13.65 K/UL — HIGH (ref 3.8–10.5)
WBC # FLD AUTO: 13.65 K/UL — HIGH (ref 3.8–10.5)

## 2020-08-22 PROCEDURE — 93010 ELECTROCARDIOGRAM REPORT: CPT

## 2020-08-22 PROCEDURE — 99223 1ST HOSP IP/OBS HIGH 75: CPT

## 2020-08-22 PROCEDURE — 74176 CT ABD & PELVIS W/O CONTRAST: CPT | Mod: 26

## 2020-08-22 RX ORDER — APIXABAN 2.5 MG/1
2.5 TABLET, FILM COATED ORAL EVERY 12 HOURS
Refills: 0 | Status: DISCONTINUED | OUTPATIENT
Start: 2020-08-22 | End: 2020-08-26

## 2020-08-22 RX ORDER — LANOLIN ALCOHOL/MO/W.PET/CERES
1 CREAM (GRAM) TOPICAL
Qty: 0 | Refills: 0 | DISCHARGE

## 2020-08-22 RX ORDER — METOPROLOL TARTRATE 50 MG
50 TABLET ORAL
Refills: 0 | Status: DISCONTINUED | OUTPATIENT
Start: 2020-08-22 | End: 2020-08-23

## 2020-08-22 RX ORDER — HYDRALAZINE HCL 50 MG
50 TABLET ORAL
Refills: 0 | Status: DISCONTINUED | OUTPATIENT
Start: 2020-08-22 | End: 2020-08-22

## 2020-08-22 RX ORDER — VANCOMYCIN HCL 1 G
125 VIAL (EA) INTRAVENOUS EVERY 6 HOURS
Refills: 0 | Status: DISCONTINUED | OUTPATIENT
Start: 2020-08-22 | End: 2020-08-22

## 2020-08-22 RX ORDER — METOPROLOL TARTRATE 50 MG
100 TABLET ORAL DAILY
Refills: 0 | Status: DISCONTINUED | OUTPATIENT
Start: 2020-08-22 | End: 2020-08-22

## 2020-08-22 RX ORDER — METRONIDAZOLE 500 MG
500 TABLET ORAL EVERY 8 HOURS
Refills: 0 | Status: DISCONTINUED | OUTPATIENT
Start: 2020-08-22 | End: 2020-08-22

## 2020-08-22 RX ORDER — FUROSEMIDE 40 MG
40 TABLET ORAL
Refills: 0 | Status: DISCONTINUED | OUTPATIENT
Start: 2020-08-22 | End: 2020-08-22

## 2020-08-22 RX ORDER — LANOLIN ALCOHOL/MO/W.PET/CERES
10 CREAM (GRAM) TOPICAL AT BEDTIME
Refills: 0 | Status: DISCONTINUED | OUTPATIENT
Start: 2020-08-22 | End: 2020-08-26

## 2020-08-22 RX ORDER — PANTOPRAZOLE SODIUM 20 MG/1
40 TABLET, DELAYED RELEASE ORAL
Refills: 0 | Status: DISCONTINUED | OUTPATIENT
Start: 2020-08-22 | End: 2020-08-26

## 2020-08-22 RX ORDER — APIXABAN 2.5 MG/1
1 TABLET, FILM COATED ORAL
Qty: 0 | Refills: 0 | DISCHARGE

## 2020-08-22 RX ORDER — ACETAMINOPHEN 500 MG
650 TABLET ORAL EVERY 6 HOURS
Refills: 0 | Status: DISCONTINUED | OUTPATIENT
Start: 2020-08-22 | End: 2020-08-26

## 2020-08-22 RX ORDER — FIDAXOMICIN 200 MG/5ML
200 GRANULE, FOR SUSPENSION ORAL
Refills: 0 | Status: DISCONTINUED | OUTPATIENT
Start: 2020-08-22 | End: 2020-08-26

## 2020-08-22 RX ORDER — LEVOTHYROXINE SODIUM 125 MCG
75 TABLET ORAL DAILY
Refills: 0 | Status: DISCONTINUED | OUTPATIENT
Start: 2020-08-22 | End: 2020-08-26

## 2020-08-22 RX ADMIN — Medication 500 MILLIGRAM(S): at 06:02

## 2020-08-22 RX ADMIN — Medication 40 MILLIGRAM(S): at 06:02

## 2020-08-22 RX ADMIN — APIXABAN 2.5 MILLIGRAM(S): 2.5 TABLET, FILM COATED ORAL at 16:16

## 2020-08-22 RX ADMIN — Medication 50 MILLIGRAM(S): at 16:16

## 2020-08-22 RX ADMIN — Medication 500 MILLIGRAM(S): at 01:42

## 2020-08-22 RX ADMIN — Medication 100 MILLIGRAM(S): at 12:31

## 2020-08-22 RX ADMIN — Medication 125 MILLIGRAM(S): at 16:16

## 2020-08-22 RX ADMIN — PANTOPRAZOLE SODIUM 40 MILLIGRAM(S): 20 TABLET, DELAYED RELEASE ORAL at 12:31

## 2020-08-22 RX ADMIN — Medication 125 MILLIGRAM(S): at 06:02

## 2020-08-22 RX ADMIN — Medication 125 MILLIGRAM(S): at 12:31

## 2020-08-22 RX ADMIN — Medication 10 MILLIGRAM(S): at 21:53

## 2020-08-22 RX ADMIN — FIDAXOMICIN 200 MILLIGRAM(S): 200 GRANULE, FOR SUSPENSION ORAL at 21:53

## 2020-08-22 RX ADMIN — Medication 125 MILLIGRAM(S): at 01:42

## 2020-08-22 RX ADMIN — Medication 75 MICROGRAM(S): at 06:02

## 2020-08-22 NOTE — ED CDU PROVIDER DISPOSITION NOTE - CLINICAL COURSE
89yo male with h/o NHL (on chemo), c-diff 1.5 months ago, demtnia p/w diarrhea x 2 days, a/w nausea. Patient found to have pancolitis on CT, placed in obs for IV hydration and antibiotics. Patient reassessed this morning, found to have increased leukocytosis on exam, continued tenderness to abdomen, unable to tolerate PO. Stool sample sent but results not back yet. Will admit to hospitalist for further management. Cathy Waterman DO

## 2020-08-22 NOTE — ED CDU PROVIDER INITIAL DAY NOTE - MEDICAL DECISION MAKING DETAILS
Pt with pancolitis, on several chemo agents for NHL. Pt currently feeling well, non-toxic, VSS, no abd pain/tenderness. Plan to keep in obs for Abx for presumed recurrence of cdiff infection, rpt labs in AM and re-assessment.

## 2020-08-22 NOTE — ED CDU PROVIDER INITIAL DAY NOTE - ATTENDING CONTRIBUTION TO CARE
89yo M pmhx of NHL (currently w/chemo), C-Diff(treated 1 month ago) and Dementia presented to ED with daughter c/o worsening diarrhea. found to have pan-colitis in ED. will place in obs for abx and re-eval in AM.

## 2020-08-22 NOTE — CONSULT NOTE ADULT - ASSESSMENT
89y  Male with h/o NHL on current chemo, dementia, ckd3, afib. He had  C diff 1 month prior treated with vancomycin. Patient now presents from home due to worsening diarrhea x 2 days. He denies fevers/chills or abd pain, tolerating diet. In the ER he is afebrile, + leukocytosis, CT with pancolitis. Started on Vancomycin PO and IV flagyl.      C diff pancolitis   Leukocytosis  NHL  immunosuppressed due to chemotherapy    - 1st reoccurance of C diff  - D/C Vancomycin and Flagyl  - Start Dificid   - Blood cultures pending  - COVID 19 PCR pending  - CT A/P reporting pancolitis   - follow up cultures  - Trend Fever  - Trend Leukocytosis      Will Follow

## 2020-08-22 NOTE — CONSULT NOTE ADULT - SUBJECTIVE AND OBJECTIVE BOX
[facility]  INFECTIOUS DISEASES AND INTERNAL MEDICINE at [place]  =======================================================  Noel Aceves MD  Diplomates American Board of Internal Medicine and Infectious Diseases  Tel: [phone]      Fax: [phone]  =======================================================      [id number]  MACARENA LANDA    CC: Patient is a 89y old  Male who presents with a chief complaint of diarrhea (22 Aug 2020 12:32)      89y  Male with h/o NHL on current chemo, dementia, ckd3, afib. He had  C diff 1 month prior treated with vancomycin. Patient now presents from home due to worsening diarrhea x 2 days. He denies fevers/chills or abd pain, tolerating diet. In the ER he is afebrile, + leukocytosis, CT with pancolitis. Started on Vancomycin PO and IV flagyl. ID input requested.       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  No earache, sore throat or runny nose.  CARDIOVASCULAR:  No pressure, squeezing, strangling, tightness, heaviness or aching about the chest, neck, axilla or epigastrium.  RESPIRATORY:  No cough, shortness of breath  GASTROINTESTINAL:  No nausea, vomiting. + diarrhea.  GENITOURINARY:  No dysuria, frequency or urgency.   MUSCULOSKELETAL:  no joint aches, no muscle pain  SKIN:  No change in skin, hair or nails.  NEUROLOGIC:  No Headaches, seizures   PSYCHIATRIC:  No disorder of thought or mood.  ENDOCRINE:  No heat or cold intolerance  HEMATOLOGICAL:  No easy bruising or bleeding.       Physical Exam:  GEN: NAD, pleasant  HEENT: normocephalic and atraumatic. EOMI. PERRL.  Anicteric  NECK: Supple.   LUNGS: Clear to auscultation.  HEART: Regular rate and rhythm   ABDOMEN: Soft, nontender, and nondistended.  Positive bowel sounds.    : No CVA tenderness  EXTREMITIES: Without any edema.  MSK: No joint swelling  NEUROLOGIC: No Focal Deficits  PSYCHIATRIC: Appropriate affect .  SKIN: No Rash        Vitals:  T(F): 97.8 (22 Aug 2020 07:34), Max: 98.5 (21 Aug 2020 23:00)  HR: 91 (22 Aug 2020 16:15)  BP: 153/73 (22 Aug 2020 16:15)  RR: 18 (22 Aug 2020 16:15)  SpO2: 98% (22 Aug 2020 16:15) (93% - 98%)  temp max in last 48H T(F): , Max: 98.5 (08-21-20 @ 23:00)      Current Antibiotics:  metroNIDAZOLE  IVPB 500 milliGRAM(s) IV Intermittent every 8 hours  vancomycin    Solution 125 milliGRAM(s) Oral every 6 hours      Other medications:  apixaban 2.5 milliGRAM(s) Oral every 12 hours  levothyroxine 75 MICROGram(s) Oral daily  melatonin 10 milliGRAM(s) Oral at bedtime  metoprolol tartrate 50 milliGRAM(s) Oral two times a day  pantoprazole    Tablet 40 milliGRAM(s) Oral before breakfast      Labs:                        10.6   13.65 )-----------( 109      ( 22 Aug 2020 06:41 )             33.3      08-22    139  |  95<L>  |  34.0<H>  ----------------------------<  79  3.5   |  28.0  |  1.55<H>    Ca    8.1<L>      22 Aug 2020 06:41    TPro  5.1<L>  /  Alb  3.0<L>  /  TBili  0.4  /  DBili  x   /  AST  11  /  ALT  10  /  AlkPhos  78  08-22      WBC Count: 13.65 K/uL (08-22-20 @ 06:41)  WBC Count: 11.64 K/uL (08-21-20 @ 17:59)    Creatinine, Serum: 1.55 mg/dL (08-22-20 @ 06:41)  Creatinine, Serum: 1.94 mg/dL (08-21-20 @ 17:59)        < from: CT Abdomen and Pelvis w/ Oral Cont (08.22.20 @ 00:15) >  EXAM:  CT ABDOMEN AND PELVIS OC                          PROCEDURE DATE:  08/22/2020      INTERPRETATION:  CLINICAL INFORMATION:  abd pain  COMPARISON: 9/17/2015  PROCEDURE:  CT of the Abdomen and Pelvis was performed without intravenous contrast.  Intravenous contrast: None.  Oral contrast: positive contrast was administered.  Sagittal and coronal reformats were performed.    FINDINGS:  LIMITATIONS: None.    LOWER CHEST: Bronchiectasis with scarring at the left lung base.  ABDOMINAL WALL:Within normal limits.  VESSELS: Extensive calcific atherosclerosis of the aorta and its branches.  BOWEL: No evidence of obstruction. Normal distal esophagus, stomach and duodenum. Mural thickening of the entire colon with pericolonic fat stranding and a small fluid. Appendix not visualized, but there is no inflammation in the right lower quadrant to suggest appendicitis.    LIVER: Within normal limits.  BILE DUCTS: Normal caliber  GALLBLADDER: Within normal limits.  SPLEEN: Within normal limits.  PANCREAS: Atrophic  ADRENALS: Within normal limits.  KIDNEYS/URETERS: Lesions emanating from both kidneys, including a 2.3 cm hyperdense lesion arising from the upper pole of the right kidney. No hydronephrosis.    BLADDER: Within normal limits.  REPRODUCTIVE ORGANS: Within normal limits.    PERITONEUM: No ascites.  RETROPERITONEUM/LYMPH NODES: No lymphadenopathy.    BONES: No acute osseous pathology. 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 Mild scoliosis with thoracolumbar degeneration.    IMPRESSION:  Pancolitis. C. difficile colitis is a consideration.    Probable complex cyst at the upper pole the right kidney, for which renal ultrasound could be considered.    < end of copied text >

## 2020-08-22 NOTE — ED CDU PROVIDER INITIAL DAY NOTE - NS ED ROS FT
Gen: denies fever, chills, fatigue, weight loss  Skin: denies rashes, laceration, bruising  HEENT: denies visual changes, ear pain, nasal congestion, throat pain  Respiratory: denies HAMEED, SOB, cough, wheezing  Cardiovascular: denies chest pain, palpitations, diaphoresis, LE edema  GI: +DIARRHEA. denies abdominal pain, n/v  : denies dysuria, frequency, urgency, bowel/bladder incontinence  MSK: denies joint swelling/pain, back pain, neck pain  Neuro: denies headache, dizziness, weakness, numbness  Psych: denies anxiety, depression, SI/HI, visual/auditory hallucinations

## 2020-08-22 NOTE — H&P ADULT - NSICDXPASTMEDICALHX_GEN_ALL_CORE_FT
PAST MEDICAL HISTORY:  GERD (gastroesophageal reflux disease)     HTN (hypertension)     Hypothyroidism     Non-Hodgkin's lymphoma Left back s/p resection and chemo  approx 2004    Skin cancer unknown type s/p chemo 10-15 years ago, s/p resection

## 2020-08-22 NOTE — ED CDU PROVIDER INITIAL DAY NOTE - OBJECTIVE STATEMENT
89yo M pmhx of NHL (currently w/chemo), C-Diff(treated 1 month ago) and Dementia presented to ED with daughter c/o worsening diarrhea onset 2 days ago, Associated w/ nausea. Daughter states that 1.5 months ago, pt had C-diff, and was placed on 2 abx, which he finished 1 month ago. Pt cleared to have chemo treatment for NHL last week, but states his diarrhea worsened 1 day ago after receiving chemo 2 days ago. Pt is currently on Rituxan, Cytoxan Etoposide and Oncovin for chemo which he received.  Daughter states he had about 10 episodes of diarrhea this 24 hours, which are yellow and malodorous.  Pt went to his PMD, and was prompted to come to the ED because he was found to be hypotensive and have low SpO2, send to ed for furt her eval.     In ED labs grossly stable, no significant wbc elevation, mild increase in Cr above baseline, CT showing pancolitis. Unable to obtain stool specimen at this time. Results d/w pt and pts daughter Sarah ([phone]). Pt's daughter unsure of what medications pt was initially treated with for cdiff but can find out tomorrow. Given pt is on multiple chemo agents and is immune compromised will keep pt in obs for abx and rpt labs in AM. Plan discussed at length with pt's daughter who is agreeable. Pt resting comfortably at this time, NAD, VSS, abdomen soft, non-tender. At this time pt denies fever, chills, abdominal pain, nausea, vomiting, cp, sob.

## 2020-08-22 NOTE — ED ADULT NURSE REASSESSMENT NOTE - NS ED NURSE REASSESS COMMENT FT1
Received pt from Elvira DOANHUE. PT resting in stretcher anxious about staying over night. RN explained to PT the need for CT and the plan.  PTs daughter Sarah contacted and updated on plan. PT more comfortable at this time. Pending CT. NAD noted
Pt received @ 0730, alert and oriented to person and place only.  Pt amb to bathroom, denies any pain, states he had another episode of liquid diarrhea.  Abd soft nondistended, nontender, moving all ext well.  Pt explained to call before going to bathroom to obtain stool sample.  Will continue to monitor.  Call bell within reach.

## 2020-08-22 NOTE — H&P ADULT - HISTORY OF PRESENT ILLNESS
89 yo M /w hx NHL on current chemo, c diff 1 month prior treated with vancomycin, dementia, ckd3, afib presents from home due to worsening diarrhea x 2 days, ~10 episodes over 28 hr period.   denies fevers/chills or abd pain. tolerating diet. + nausea. diarrhea resolved after initial treatment but resumed after restarting chemo ~10 days prior.  in Er, CT with pancolitis and leukocytosis.

## 2020-08-22 NOTE — ED CDU PROVIDER INITIAL DAY NOTE - PHYSICAL EXAMINATION
General:     NAD, well-nourished, well-appearing  Head:     NC/AT, EOMI, oral mucosa moist  Neck:     trachea midline  Cardiac: RRR +S1S2  Lungs:    CTAB  Abd: +BS x 4. Soft, non-tender, non-distended. No guarding or rebound.  Ext:    2+ radial and pedal pulses, no c/c/e  Neuro: AAOx3, no sensory/motor deficits

## 2020-08-22 NOTE — H&P ADULT - ASSESSMENT
89 yo M /w hx NHL on current chemo, c diff 1 month prior treated with vancomycin, dementia, ckd3 presents from home due to worsening diarrhea x 2 days, ~10 episodes over 28 hr period.   denies fevers/chills or abd pain. tolerating diet. + nausea. diarrhea resolved after initial treatment but resumed after restarting chemo ~10 days prior.  in Er, CT with pancolitis and leukocytosis.      pancolitis: concern for recurrent C diff    cdiff pcr pending      if negative check stool culture and gi pcr.    c/w flagyl/vancomycin    ID consulted    CKD3: seems at baseline    Afib: hold toprol 100mg daily and hydralazine 50mg BID given low bp    hold lasix 40mg daily given diarrhea    c/w eliquis    NHL on chemo: outpatient follow up    d/w daughter over the phone.    suspect dc in 48 hrs pending improvement in diarrhea.

## 2020-08-23 LAB
ALBUMIN SERPL ELPH-MCNC: 3.2 G/DL — LOW (ref 3.3–5.2)
ALP SERPL-CCNC: 88 U/L — SIGNIFICANT CHANGE UP (ref 40–120)
ALT FLD-CCNC: 12 U/L — SIGNIFICANT CHANGE UP
ANION GAP SERPL CALC-SCNC: 15 MMOL/L — SIGNIFICANT CHANGE UP (ref 5–17)
AST SERPL-CCNC: 13 U/L — SIGNIFICANT CHANGE UP
BASOPHILS # BLD AUTO: 0.04 K/UL — SIGNIFICANT CHANGE UP (ref 0–0.2)
BASOPHILS NFR BLD AUTO: 0.2 % — SIGNIFICANT CHANGE UP (ref 0–2)
BILIRUB SERPL-MCNC: 0.2 MG/DL — LOW (ref 0.4–2)
BUN SERPL-MCNC: 37 MG/DL — HIGH (ref 8–20)
CALCIUM SERPL-MCNC: 8.2 MG/DL — LOW (ref 8.6–10.2)
CHLORIDE SERPL-SCNC: 97 MMOL/L — LOW (ref 98–107)
CO2 SERPL-SCNC: 30 MMOL/L — HIGH (ref 22–29)
CREAT SERPL-MCNC: 1.94 MG/DL — HIGH (ref 0.5–1.3)
EOSINOPHIL # BLD AUTO: 0.08 K/UL — SIGNIFICANT CHANGE UP (ref 0–0.5)
EOSINOPHIL NFR BLD AUTO: 0.4 % — SIGNIFICANT CHANGE UP (ref 0–6)
GLUCOSE SERPL-MCNC: 103 MG/DL — HIGH (ref 70–99)
HCT VFR BLD CALC: 36.9 % — LOW (ref 39–50)
HGB BLD-MCNC: 11.3 G/DL — LOW (ref 13–17)
IMM GRANULOCYTES NFR BLD AUTO: 8 % — HIGH (ref 0–1.5)
LYMPHOCYTES # BLD AUTO: 0.8 K/UL — LOW (ref 1–3.3)
LYMPHOCYTES # BLD AUTO: 4 % — LOW (ref 13–44)
MCHC RBC-ENTMCNC: 28.5 PG — SIGNIFICANT CHANGE UP (ref 27–34)
MCHC RBC-ENTMCNC: 30.6 GM/DL — LOW (ref 32–36)
MCV RBC AUTO: 93.2 FL — SIGNIFICANT CHANGE UP (ref 80–100)
MONOCYTES # BLD AUTO: 1.45 K/UL — HIGH (ref 0–0.9)
MONOCYTES NFR BLD AUTO: 7.3 % — SIGNIFICANT CHANGE UP (ref 2–14)
NEUTROPHILS # BLD AUTO: 15.81 K/UL — HIGH (ref 1.8–7.4)
NEUTROPHILS NFR BLD AUTO: 80.1 % — HIGH (ref 43–77)
PLATELET # BLD AUTO: 142 K/UL — LOW (ref 150–400)
POTASSIUM SERPL-MCNC: 3.2 MMOL/L — LOW (ref 3.5–5.3)
POTASSIUM SERPL-SCNC: 3.2 MMOL/L — LOW (ref 3.5–5.3)
PROT SERPL-MCNC: 5.5 G/DL — LOW (ref 6.6–8.7)
RBC # BLD: 3.96 M/UL — LOW (ref 4.2–5.8)
RBC # FLD: 14.6 % — HIGH (ref 10.3–14.5)
SARS-COV-2 IGG SERPL QL IA: NEGATIVE — SIGNIFICANT CHANGE UP
SARS-COV-2 IGM SERPL IA-ACNC: 0.09 INDEX — SIGNIFICANT CHANGE UP
SARS-COV-2 RNA SPEC QL NAA+PROBE: SIGNIFICANT CHANGE UP
SODIUM SERPL-SCNC: 142 MMOL/L — SIGNIFICANT CHANGE UP (ref 135–145)
WBC # BLD: 19.77 K/UL — HIGH (ref 3.8–10.5)
WBC # FLD AUTO: 19.77 K/UL — HIGH (ref 3.8–10.5)

## 2020-08-23 PROCEDURE — 99232 SBSQ HOSP IP/OBS MODERATE 35: CPT

## 2020-08-23 RX ORDER — POTASSIUM CHLORIDE 20 MEQ
40 PACKET (EA) ORAL EVERY 4 HOURS
Refills: 0 | Status: COMPLETED | OUTPATIENT
Start: 2020-08-23 | End: 2020-08-23

## 2020-08-23 RX ORDER — METOPROLOL TARTRATE 50 MG
50 TABLET ORAL
Refills: 0 | Status: COMPLETED | OUTPATIENT
Start: 2020-08-23 | End: 2020-08-23

## 2020-08-23 RX ORDER — METOPROLOL TARTRATE 50 MG
100 TABLET ORAL DAILY
Refills: 0 | Status: DISCONTINUED | OUTPATIENT
Start: 2020-08-24 | End: 2020-08-26

## 2020-08-23 RX ORDER — HYDRALAZINE HCL 50 MG
50 TABLET ORAL
Refills: 0 | Status: DISCONTINUED | OUTPATIENT
Start: 2020-08-23 | End: 2020-08-26

## 2020-08-23 RX ADMIN — Medication 40 MILLIEQUIVALENT(S): at 10:10

## 2020-08-23 RX ADMIN — FIDAXOMICIN 200 MILLIGRAM(S): 200 GRANULE, FOR SUSPENSION ORAL at 05:51

## 2020-08-23 RX ADMIN — APIXABAN 2.5 MILLIGRAM(S): 2.5 TABLET, FILM COATED ORAL at 16:16

## 2020-08-23 RX ADMIN — PANTOPRAZOLE SODIUM 40 MILLIGRAM(S): 20 TABLET, DELAYED RELEASE ORAL at 05:51

## 2020-08-23 RX ADMIN — Medication 75 MICROGRAM(S): at 05:51

## 2020-08-23 RX ADMIN — APIXABAN 2.5 MILLIGRAM(S): 2.5 TABLET, FILM COATED ORAL at 05:51

## 2020-08-23 RX ADMIN — Medication 50 MILLIGRAM(S): at 05:51

## 2020-08-23 RX ADMIN — FIDAXOMICIN 200 MILLIGRAM(S): 200 GRANULE, FOR SUSPENSION ORAL at 16:15

## 2020-08-23 RX ADMIN — Medication 40 MILLIEQUIVALENT(S): at 15:16

## 2020-08-23 RX ADMIN — Medication 10 MILLIGRAM(S): at 21:46

## 2020-08-23 NOTE — PROGRESS NOTE ADULT - SUBJECTIVE AND OBJECTIVE BOX
[facility]  INFECTIOUS DISEASES AND INTERNAL MEDICINE at [place]  =======================================================  Noel Aceves MD  Diplomates American Board of Internal Medicine and Infectious Diseases  Tel: [phone]      Fax: [phone]  =======================================================    MACARENA LANDA [id number]    Follow up: C DIff    No diarrhea since yesterday    No fever or chills       Allergies:  No Known Allergies      REVIEW OF SYSTEMS:  CONSTITUTIONAL:  No Fever or chills  HEENT:  No diplopia or blurred vision.  No earache, sore throat or runny nose.  CARDIOVASCULAR:  No pressure, squeezing, strangling, tightness, heaviness or aching about the chest, neck, axilla or epigastrium.  RESPIRATORY:  No cough, shortness of breath  GASTROINTESTINAL:  No nausea, vomiting, diarrhea.  GENITOURINARY:  No dysuria, frequency or urgency.   MUSCULOSKELETAL:  no joint aches, no muscle pain  SKIN:  No change in skin, hair or nails.  NEUROLOGIC:  No Headaches, seizures   PSYCHIATRIC:  No disorder of thought or mood.  ENDOCRINE:  No heat or cold intolerance  HEMATOLOGICAL:  No easy bruising or bleeding.       Physical Exam:  GEN: NAD, pleasant  HEENT: normocephalic and atraumatic. EOMI. PERRL.  Anicteric  NECK: Supple.   LUNGS: Clear to auscultation.  HEART: Regular rate and rhythm   ABDOMEN: Soft, nontender, and nondistended.  Positive bowel sounds.    : No CVA tenderness  EXTREMITIES: Without any edema.  MSK: No joint swelling  NEUROLOGIC: No Focal Deficits  PSYCHIATRIC: Appropriate affect .  SKIN: No Rash        Vitals:  T(F): 97.7 (23 Aug 2020 05:50), Max: 98.2 (22 Aug 2020 21:47)  HR: 90 (23 Aug 2020 05:50)  BP: 134/70 (23 Aug 2020 05:50)  RR: 18 (23 Aug 2020 05:50)  SpO2: 96% (23 Aug 2020 05:50) (93% - 98%)  temp max in last 48H T(F): , Max: 98.5 (08-21-20 @ 23:00)      Current Antibiotics:  fidaxomicin 200 milliGRAM(s) Oral two times a day      Other medications:  apixaban 2.5 milliGRAM(s) Oral every 12 hours  levothyroxine 75 MICROGram(s) Oral daily  melatonin 10 milliGRAM(s) Oral at bedtime  metoprolol tartrate 50 milliGRAM(s) Oral two times a day  pantoprazole    Tablet 40 milliGRAM(s) Oral before breakfast      Labs:                        11.3   19.77 )-----------( 142      ( 23 Aug 2020 06:42 )             36.9      08-23    142  |  97<L>  |  37.0<H>  ----------------------------<  103<H>  3.2<L>   |  30.0<H>  |  1.94<H>    Ca    8.2<L>      23 Aug 2020 06:42    TPro  5.5<L>  /  Alb  3.2<L>  /  TBili  0.2<L>  /  DBili  x   /  AST  13  /  ALT  12  /  AlkPhos  88  08-23      WBC Count: 19.77 K/uL (08-23-20 @ 06:42)  WBC Count: 13.65 K/uL (08-22-20 @ 06:41)  WBC Count: 11.64 K/uL (08-21-20 @ 17:59)    Creatinine, Serum: 1.94 mg/dL (08-23-20 @ 06:42)  Creatinine, Serum: 1.55 mg/dL (08-22-20 @ 06:41)  Creatinine, Serum: 1.94 mg/dL (08-21-20 @ 17:59)

## 2020-08-23 NOTE — PROGRESS NOTE ADULT - ASSESSMENT
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   denies fevers/chills or abd pain. tolerating diet. + nausea. diarrhea resolved after initial treatment but resumed after restarting chemo ~10 days prior.  in Er, CT with pancolitis and leukocytosis.      c diff pancolitis    dc flagyl/vancomycin    ID consulted and started fadoximicin     CKD3: seems at baseline    Afib: restart toprol 100mg daily and hydralazine 50mg BID     hold lasix 40mg daily given diarrhea    c/w eliquis    NHL on chemo: outpatient follow up    d/w daughter over the phone.    suspect dc in 24hrs pending improvement in diarrhea.

## 2020-08-23 NOTE — PROGRESS NOTE ADULT - SUBJECTIVE AND OBJECTIVE BOX
seen for C diff    1 episode of liquid stool  minimal abdominal discomfort  tolerating diet  wants to go home  ros negative     MEDICATIONS  (STANDING):  apixaban 2.5 milliGRAM(s) Oral every 12 hours  fidaxomicin 200 milliGRAM(s) Oral two times a day  levothyroxine 75 MICROGram(s) Oral daily  melatonin 10 milliGRAM(s) Oral at bedtime  metoprolol tartrate 50 milliGRAM(s) Oral two times a day  pantoprazole    Tablet 40 milliGRAM(s) Oral before breakfast  potassium chloride    Tablet ER 40 milliEquivalent(s) Oral every 4 hours    MEDICATIONS  (PRN):  acetaminophen   Tablet .. 650 milliGRAM(s) Oral every 6 hours PRN Temp greater or equal to 38C (100.4F), Mild Pain (1 - 3)      Allergies    No Known Allergies    Vital Signs Last 24 Hrs  T(C): 36.7 (23 Aug 2020 08:31), Max: 36.8 (22 Aug 2020 21:47)  T(F): 98.1 (23 Aug 2020 08:31), Max: 98.2 (22 Aug 2020 21:47)  HR: 80 (23 Aug 2020 08:31) (70 - 91)  BP: 150/76 (23 Aug 2020 08:31) (105/61 - 153/73)  BP(mean): --  RR: 18 (23 Aug 2020 08:31) (16 - 18)  SpO2: 97% (23 Aug 2020 08:31) (93% - 98%)    PHYSICAL EXAM:    GENERAL: NAD  CHEST/LUNG: Clear to percussion bilaterally  HEART: Regular rate and rhythm; S1 S2  ABDOMEN: Soft, Nondistended; Bowel sounds present  EXTREMITIES: no edema   NERVOUS SYSTEM:  Alert & Oriented X3, Motor Strength 5/5 B/L upper and lower extremities      LABS:                        11.3   19.77 )-----------( 142      ( 23 Aug 2020 06:42 )             36.9     08-23    142  |  97<L>  |  37.0<H>  ----------------------------<  103<H>  3.2<L>   |  30.0<H>  |  1.94<H>    Ca    8.2<L>      23 Aug 2020 06:42    TPro  5.5<L>  /  Alb  3.2<L>  /  TBili  0.2<L>  /  DBili  x   /  AST  13  /  ALT  12  /  AlkPhos  88  08-23          CAPILLARY BLOOD GLUCOSE            RADIOLOGY & ADDITIONAL TESTS:

## 2020-08-23 NOTE — PROGRESS NOTE ADULT - ASSESSMENT
89y  Male with h/o NHL on current chemo, dementia, ckd3, afib. He had  C diff 1 month prior treated with vancomycin. Patient now presents from home due to worsening diarrhea x 2 days. He denies fevers/chills or abd pain, tolerating diet. In the ER he is afebrile, + leukocytosis, CT with pancolitis. Started on Vancomycin PO and IV flagyl.      C diff pancolitis   Leukocytosis  NHL  immunosuppressed due to chemotherapy    - 1st reoccurance of C diff  - Continue Dificid 200mg PO q 12hours, with plan for 10 day course  - Blood cultures pending  - COVID 19 PCR pending  - CT A/P reporting pancolitis   - follow up cultures  - Trend Fever  - Trend Leukocytosis      Will Follow

## 2020-08-24 LAB
ANION GAP SERPL CALC-SCNC: 12 MMOL/L — SIGNIFICANT CHANGE UP (ref 5–17)
BUN SERPL-MCNC: 26 MG/DL — HIGH (ref 8–20)
CALCIUM SERPL-MCNC: 8 MG/DL — LOW (ref 8.6–10.2)
CHLORIDE SERPL-SCNC: 103 MMOL/L — SIGNIFICANT CHANGE UP (ref 98–107)
CO2 SERPL-SCNC: 26 MMOL/L — SIGNIFICANT CHANGE UP (ref 22–29)
CREAT SERPL-MCNC: 1.24 MG/DL — SIGNIFICANT CHANGE UP (ref 0.5–1.3)
GLUCOSE SERPL-MCNC: 119 MG/DL — HIGH (ref 70–99)
HCT VFR BLD CALC: 35.9 % — LOW (ref 39–50)
HGB BLD-MCNC: 11.3 G/DL — LOW (ref 13–17)
MAGNESIUM SERPL-MCNC: 1.3 MG/DL — LOW (ref 1.6–2.6)
MCHC RBC-ENTMCNC: 28.5 PG — SIGNIFICANT CHANGE UP (ref 27–34)
MCHC RBC-ENTMCNC: 31.5 GM/DL — LOW (ref 32–36)
MCV RBC AUTO: 90.7 FL — SIGNIFICANT CHANGE UP (ref 80–100)
PHOSPHATE SERPL-MCNC: 1.9 MG/DL — LOW (ref 2.4–4.7)
PLATELET # BLD AUTO: 178 K/UL — SIGNIFICANT CHANGE UP (ref 150–400)
POTASSIUM SERPL-MCNC: 3.2 MMOL/L — LOW (ref 3.5–5.3)
POTASSIUM SERPL-SCNC: 3.2 MMOL/L — LOW (ref 3.5–5.3)
RBC # BLD: 3.96 M/UL — LOW (ref 4.2–5.8)
RBC # FLD: 14.8 % — HIGH (ref 10.3–14.5)
SODIUM SERPL-SCNC: 141 MMOL/L — SIGNIFICANT CHANGE UP (ref 135–145)
WBC # BLD: 21.41 K/UL — HIGH (ref 3.8–10.5)
WBC # FLD AUTO: 21.41 K/UL — HIGH (ref 3.8–10.5)

## 2020-08-24 PROCEDURE — 99232 SBSQ HOSP IP/OBS MODERATE 35: CPT

## 2020-08-24 PROCEDURE — 74018 RADEX ABDOMEN 1 VIEW: CPT | Mod: 26

## 2020-08-24 RX ORDER — MAGNESIUM SULFATE 500 MG/ML
2 VIAL (ML) INJECTION EVERY 4 HOURS
Refills: 0 | Status: COMPLETED | OUTPATIENT
Start: 2020-08-24 | End: 2020-08-24

## 2020-08-24 RX ADMIN — APIXABAN 2.5 MILLIGRAM(S): 2.5 TABLET, FILM COATED ORAL at 17:19

## 2020-08-24 RX ADMIN — Medication 10 MILLIGRAM(S): at 22:46

## 2020-08-24 RX ADMIN — Medication 100 MILLIGRAM(S): at 05:24

## 2020-08-24 RX ADMIN — PANTOPRAZOLE SODIUM 40 MILLIGRAM(S): 20 TABLET, DELAYED RELEASE ORAL at 06:42

## 2020-08-24 RX ADMIN — APIXABAN 2.5 MILLIGRAM(S): 2.5 TABLET, FILM COATED ORAL at 05:24

## 2020-08-24 RX ADMIN — Medication 50 MILLIGRAM(S): at 17:19

## 2020-08-24 RX ADMIN — Medication 50 GRAM(S): at 17:19

## 2020-08-24 RX ADMIN — Medication 50 GRAM(S): at 09:37

## 2020-08-24 RX ADMIN — Medication 75 MICROGRAM(S): at 05:24

## 2020-08-24 RX ADMIN — FIDAXOMICIN 200 MILLIGRAM(S): 200 GRANULE, FOR SUSPENSION ORAL at 17:19

## 2020-08-24 RX ADMIN — Medication 85 MILLIMOLE(S): at 11:06

## 2020-08-24 RX ADMIN — Medication 50 MILLIGRAM(S): at 05:24

## 2020-08-24 RX ADMIN — FIDAXOMICIN 200 MILLIGRAM(S): 200 GRANULE, FOR SUSPENSION ORAL at 05:24

## 2020-08-24 NOTE — PROGRESS NOTE ADULT - ASSESSMENT
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   denies fevers/chills or abd pain. tolerating diet. + nausea. diarrhea resolved after initial treatment but resumed after restarting chemo ~10 days prior.  in Er, CT with pancolitis and leukocytosis.      c diff pancolitis    dc flagyl/vancomycin    ID consulted and started fadoximicin     CKD3: seems at baseline    Afib: restart toprol 100mg daily and hydralazine 50mg BID     hold lasix 40mg daily given diarrhea    c/w eliquis    Leukocytosis : unclear if 2/2 colitis or med induced filgrastim or such)    will d/w oncology     check KUB    NHL on chemo: outpatient follow up    low mg/phos: replete    d/w daughter over the phone and ID.  monitor for 24hrs more

## 2020-08-24 NOTE — PROGRESS NOTE ADULT - SUBJECTIVE AND OBJECTIVE BOX
seen for C diff     1 bout of diarrhea tolerating diet  wants to go home.  ros negative     MEDICATIONS  (STANDING):  apixaban 2.5 milliGRAM(s) Oral every 12 hours  fidaxomicin 200 milliGRAM(s) Oral two times a day  hydrALAZINE 50 milliGRAM(s) Oral two times a day  levothyroxine 75 MICROGram(s) Oral daily  magnesium sulfate  IVPB 2 Gram(s) IV Intermittent every 4 hours  melatonin 10 milliGRAM(s) Oral at bedtime  metoprolol succinate  milliGRAM(s) Oral daily  pantoprazole    Tablet 40 milliGRAM(s) Oral before breakfast    MEDICATIONS  (PRN):  acetaminophen   Tablet .. 650 milliGRAM(s) Oral every 6 hours PRN Temp greater or equal to 38C (100.4F), Mild Pain (1 - 3)      Allergies    No Known Allergies      Vital Signs Last 24 Hrs  T(C): 36.7 (24 Aug 2020 08:30), Max: 37.1 (24 Aug 2020 05:21)  T(F): 98 (24 Aug 2020 08:30), Max: 98.7 (24 Aug 2020 05:21)  HR: 89 (24 Aug 2020 08:30) (80 - 120)  BP: 119/63 (24 Aug 2020 08:30) (98/60 - 136/87)  BP(mean): --  RR: 18 (24 Aug 2020 08:30) (18 - 18)  SpO2: 97% (24 Aug 2020 08:30) (94% - 98%)    PHYSICAL EXAM:    GENERAL: NAD  CHEST/LUNG: Clear to percussion bilaterally  HEART: Regular rate and rhythm; S1 S2  ABDOMEN: Soft, Bowel sounds present  EXTREMITIES:  no edema   NERVOUS SYSTEM:  Alert & Oriented X3, Motor Strength 5/5 B/L upper and lower extremities      LABS:                        11.3   21.41 )-----------( 178      ( 24 Aug 2020 07:44 )             35.9     08-24    141  |  103  |  26.0<H>  ----------------------------<  119<H>  3.2<L>   |  26.0  |  1.24    Ca    8.0<L>      24 Aug 2020 07:44  Phos  1.9     08-24  Mg     1.3     08-24    TPro  5.5<L>  /  Alb  3.2<L>  /  TBili  0.2<L>  /  DBili  x   /  AST  13  /  ALT  12  /  AlkPhos  88  08-23          CAPILLARY BLOOD GLUCOSE            RADIOLOGY & ADDITIONAL TESTS:

## 2020-08-24 NOTE — PROGRESS NOTE ADULT - ASSESSMENT
89y  Male with h/o NHL on current chemo, dementia, ckd3, afib. He had  C diff 1 month prior treated with vancomycin. Patient now presents from home due to worsening diarrhea x 2 days. He denies fevers/chills or abd pain, tolerating diet. In the ER he is afebrile, + leukocytosis, CT with pancolitis. Started on Vancomycin PO and IV flagyl.      C diff pancolitis   Leukocytosis  NHL  immunosuppressed due to chemotherapy      - 1st reoccurrence of C diff  - Continue Dificid 200mg PO q 12hours, with plan for 10 day course  - Blood cultures  no growth   - COVID 19 PCR negative   - CT A/P reporting pancolitis   - follow up cultures  - Trend Fever  - Trend Leukocytosis      Will Follow

## 2020-08-24 NOTE — PROGRESS NOTE ADULT - SUBJECTIVE AND OBJECTIVE BOX
[facility]  INFECTIOUS DISEASES AND INTERNAL MEDICINE at [place]  =======================================================  Noel Aceves MD  Diplomates American Board of Internal Medicine and Infectious Diseases  Tel: [phone]      Fax: [phone]  =======================================================    MACARENA LANDA [id number]    Follow up: C DIff    + diarrhea one episode this morning, 1 episode overnight     No fever or chills       Allergies:  No Known Allergies      REVIEW OF SYSTEMS:  CONSTITUTIONAL:  No Fever or chills  HEENT:  No diplopia or blurred vision.  No earache, sore throat or runny nose.  CARDIOVASCULAR:  No pressure, squeezing, strangling, tightness, heaviness or aching about the chest, neck, axilla or epigastrium.  RESPIRATORY:  No cough, shortness of breath  GASTROINTESTINAL:  No nausea, vomiting, diarrhea.  GENITOURINARY:  No dysuria, frequency or urgency.   MUSCULOSKELETAL:  no joint aches, no muscle pain  SKIN:  No change in skin, hair or nails.  NEUROLOGIC:  No Headaches, seizures   PSYCHIATRIC:  No disorder of thought or mood.  ENDOCRINE:  No heat or cold intolerance  HEMATOLOGICAL:  No easy bruising or bleeding.       Physical Exam:  GEN: NAD, pleasant  HEENT: normocephalic and atraumatic. EOMI. PERRL.  Anicteric  NECK: Supple.   LUNGS: Clear to auscultation.  HEART: Regular rate and rhythm   ABDOMEN: Soft, nontender, and nondistended.  Positive bowel sounds.    : No CVA tenderness  EXTREMITIES: Without any edema.  MSK: No joint swelling  NEUROLOGIC: No Focal Deficits  PSYCHIATRIC: Appropriate affect .  SKIN: No Rash      Vitals:    T(F): 98 (24 Aug 2020 08:30), Max: 98.7 (24 Aug 2020 05:21)  HR: 89 (24 Aug 2020 08:30)  BP: 119/63 (24 Aug 2020 08:30)  RR: 18 (24 Aug 2020 08:30)  SpO2: 97% (24 Aug 2020 08:30) (94% - 98%)  temp max in last 48H T(F): , Max: 98.7 (08-24-20 @ 05:21)      Current Antibiotics:  fidaxomicin 200 milliGRAM(s) Oral two times a day    Other medications:  apixaban 2.5 milliGRAM(s) Oral every 12 hours  hydrALAZINE 50 milliGRAM(s) Oral two times a day  levothyroxine 75 MICROGram(s) Oral daily  magnesium sulfate  IVPB 2 Gram(s) IV Intermittent every 4 hours  melatonin 10 milliGRAM(s) Oral at bedtime  metoprolol succinate  milliGRAM(s) Oral daily  pantoprazole    Tablet 40 milliGRAM(s) Oral before breakfast        Labs:             11.3   21.41 )-----------( 178      ( 24 Aug 2020 07:44 )             35.9      08-24    141  |  103  |  26.0<H>  ----------------------------<  119<H>  3.2<L>   |  26.0  |  1.24    Ca    8.0<L>      24 Aug 2020 07:44  Phos  1.9     08-24  Mg     1.3     08-24    TPro  5.5<L>  /  Alb  3.2<L>  /  TBili  0.2<L>  /  DBili  x   /  AST  13  /  ALT  12  /  AlkPhos  88  08-23      Culture - Blood (collected 08-21-20 @ 18:18)  Source: .Blood Blood-Peripheral    Culture - Blood (collected 08-21-20 @ 18:18)  Source: .Blood Blood-Peripheral      WBC Count: 21.41 K/uL (08-24-20 @ 07:44)  WBC Count: 19.77 K/uL (08-23-20 @ 06:42)  WBC Count: 13.65 K/uL (08-22-20 @ 06:41)  WBC Count: 11.64 K/uL (08-21-20 @ 17:59)    Creatinine, Serum: 1.24 mg/dL (08-24-20 @ 07:44)  Creatinine, Serum: 1.94 mg/dL (08-23-20 @ 06:42)  Creatinine, Serum: 1.55 mg/dL (08-22-20 @ 06:41)  Creatinine, Serum: 1.94 mg/dL (08-21-20 @ 17:59)      COVID-19 PCR: NotDetec (08-22-20 @ 13:47)        < from: CT Abdomen and Pelvis w/ Oral Cont (08.22.20 @ 00:15) >  EXAM:  CT ABDOMEN AND PELVIS OC                          PROCEDURE DATE:  08/22/2020          INTERPRETATION:  CLINICAL INFORMATION:  abd pain  COMPARISON: 9/17/2015  PROCEDURE:  CT of the Abdomen and Pelvis was performed without intravenous contrast.  Intravenous contrast: None.  Oral contrast: positive contrast was administered.  Sagittal and coronal reformats were performed.    FINDINGS:  LIMITATIONS: None.    LOWER CHEST: Bronchiectasis with scarring at the left lung base.  ABDOMINAL WALL:Within normal limits.  VESSELS: Extensive calcific atherosclerosis of the aorta and its branches.  BOWEL: No evidence of obstruction. Normal distal esophagus, stomach and duodenum. Mural thickening of the entire colon with pericolonic fat stranding and a small fluid. Appendix not visualized, but there is no inflammation in the right lower quadrant to suggest appendicitis.    LIVER: Within normal limits.  BILE DUCTS: Normal caliber  GALLBLADDER: Within normal limits.  SPLEEN: Within normal limits.  PANCREAS: Atrophic  ADRENALS: Within normal limits.  KIDNEYS/URETERS: Lesions emanating from both kidneys, including a 2.3 cm hyperdense lesion arising from the upper pole of the right kidney. No hydronephrosis.    BLADDER: Within normal limits.  REPRODUCTIVE ORGANS: Within normal limits.    PERITONEUM: No ascites.  RETROPERITONEUM/LYMPH NODES: No lymphadenopathy.    BONES: No acute osseous pathology. 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 Mild scoliosis with thoracolumbar degeneration.    IMPRESSION:  Pancolitis. C. difficile colitis is a consideration.    Probable complex cyst at the upper pole the right kidney, for which renal ultrasound could be considered.    < end of copied text >

## 2020-08-25 ENCOUNTER — TRANSCRIPTION ENCOUNTER (OUTPATIENT)
Age: 85
End: 2020-08-25

## 2020-08-25 LAB
ANION GAP SERPL CALC-SCNC: 13 MMOL/L — SIGNIFICANT CHANGE UP (ref 5–17)
BASOPHILS # BLD AUTO: 0.09 K/UL — SIGNIFICANT CHANGE UP (ref 0–0.2)
BASOPHILS NFR BLD AUTO: 0.6 % — SIGNIFICANT CHANGE UP (ref 0–2)
BUN SERPL-MCNC: 17 MG/DL — SIGNIFICANT CHANGE UP (ref 8–20)
CALCIUM SERPL-MCNC: 7.7 MG/DL — LOW (ref 8.6–10.2)
CHLORIDE SERPL-SCNC: 102 MMOL/L — SIGNIFICANT CHANGE UP (ref 98–107)
CO2 SERPL-SCNC: 28 MMOL/L — SIGNIFICANT CHANGE UP (ref 22–29)
CREAT SERPL-MCNC: 1.33 MG/DL — HIGH (ref 0.5–1.3)
EOSINOPHIL # BLD AUTO: 0.1 K/UL — SIGNIFICANT CHANGE UP (ref 0–0.5)
EOSINOPHIL NFR BLD AUTO: 0.7 % — SIGNIFICANT CHANGE UP (ref 0–6)
GLUCOSE SERPL-MCNC: 155 MG/DL — HIGH (ref 70–99)
HCT VFR BLD CALC: 31.8 % — LOW (ref 39–50)
HGB BLD-MCNC: 10 G/DL — LOW (ref 13–17)
IMM GRANULOCYTES NFR BLD AUTO: 25.2 % — HIGH (ref 0–1.5)
LYMPHOCYTES # BLD AUTO: 1.01 K/UL — SIGNIFICANT CHANGE UP (ref 1–3.3)
LYMPHOCYTES # BLD AUTO: 7.1 % — LOW (ref 13–44)
MAGNESIUM SERPL-MCNC: 2.1 MG/DL — SIGNIFICANT CHANGE UP (ref 1.6–2.6)
MCHC RBC-ENTMCNC: 28.8 PG — SIGNIFICANT CHANGE UP (ref 27–34)
MCHC RBC-ENTMCNC: 31.4 GM/DL — LOW (ref 32–36)
MCV RBC AUTO: 91.6 FL — SIGNIFICANT CHANGE UP (ref 80–100)
MONOCYTES # BLD AUTO: 1.06 K/UL — HIGH (ref 0–0.9)
MONOCYTES NFR BLD AUTO: 7.5 % — SIGNIFICANT CHANGE UP (ref 2–14)
NEUTROPHILS # BLD AUTO: 8.31 K/UL — HIGH (ref 1.8–7.4)
NEUTROPHILS NFR BLD AUTO: 58.9 % — SIGNIFICANT CHANGE UP (ref 43–77)
PHOSPHATE SERPL-MCNC: 2.8 MG/DL — SIGNIFICANT CHANGE UP (ref 2.4–4.7)
PLATELET # BLD AUTO: 173 K/UL — SIGNIFICANT CHANGE UP (ref 150–400)
POTASSIUM SERPL-MCNC: 2.6 MMOL/L — CRITICAL LOW (ref 3.5–5.3)
POTASSIUM SERPL-MCNC: 4.5 MMOL/L — SIGNIFICANT CHANGE UP (ref 3.5–5.3)
POTASSIUM SERPL-SCNC: 2.6 MMOL/L — CRITICAL LOW (ref 3.5–5.3)
POTASSIUM SERPL-SCNC: 4.5 MMOL/L — SIGNIFICANT CHANGE UP (ref 3.5–5.3)
RBC # BLD: 3.47 M/UL — LOW (ref 4.2–5.8)
RBC # FLD: 15 % — HIGH (ref 10.3–14.5)
SODIUM SERPL-SCNC: 143 MMOL/L — SIGNIFICANT CHANGE UP (ref 135–145)
WBC # BLD: 14.13 K/UL — HIGH (ref 3.8–10.5)
WBC # FLD AUTO: 14.13 K/UL — HIGH (ref 3.8–10.5)

## 2020-08-25 PROCEDURE — 99232 SBSQ HOSP IP/OBS MODERATE 35: CPT

## 2020-08-25 PROCEDURE — 99239 HOSP IP/OBS DSCHRG MGMT >30: CPT

## 2020-08-25 RX ORDER — POTASSIUM CHLORIDE 20 MEQ
40 PACKET (EA) ORAL EVERY 4 HOURS
Refills: 0 | Status: COMPLETED | OUTPATIENT
Start: 2020-08-25 | End: 2020-08-25

## 2020-08-25 RX ORDER — PANTOPRAZOLE SODIUM 20 MG/1
1 TABLET, DELAYED RELEASE ORAL
Qty: 0 | Refills: 0 | DISCHARGE
Start: 2020-08-25

## 2020-08-25 RX ORDER — POTASSIUM CHLORIDE 20 MEQ
10 PACKET (EA) ORAL
Refills: 0 | Status: COMPLETED | OUTPATIENT
Start: 2020-08-25 | End: 2020-08-25

## 2020-08-25 RX ORDER — FIDAXOMICIN 200 MG/5ML
1 GRANULE, FOR SUSPENSION ORAL
Qty: 16 | Refills: 0
Start: 2020-08-25 | End: 2020-09-01

## 2020-08-25 RX ADMIN — Medication 100 MILLIEQUIVALENT(S): at 12:09

## 2020-08-25 RX ADMIN — PANTOPRAZOLE SODIUM 40 MILLIGRAM(S): 20 TABLET, DELAYED RELEASE ORAL at 05:50

## 2020-08-25 RX ADMIN — Medication 40 MILLIEQUIVALENT(S): at 16:09

## 2020-08-25 RX ADMIN — Medication 40 MILLIEQUIVALENT(S): at 12:09

## 2020-08-25 RX ADMIN — APIXABAN 2.5 MILLIGRAM(S): 2.5 TABLET, FILM COATED ORAL at 05:50

## 2020-08-25 RX ADMIN — Medication 75 MICROGRAM(S): at 05:50

## 2020-08-25 RX ADMIN — Medication 100 MILLIEQUIVALENT(S): at 11:02

## 2020-08-25 RX ADMIN — Medication 650 MILLIGRAM(S): at 00:41

## 2020-08-25 RX ADMIN — APIXABAN 2.5 MILLIGRAM(S): 2.5 TABLET, FILM COATED ORAL at 18:43

## 2020-08-25 RX ADMIN — Medication 100 MILLIEQUIVALENT(S): at 09:47

## 2020-08-25 RX ADMIN — Medication 50 MILLIGRAM(S): at 18:43

## 2020-08-25 RX ADMIN — Medication 650 MILLIGRAM(S): at 00:44

## 2020-08-25 RX ADMIN — FIDAXOMICIN 200 MILLIGRAM(S): 200 GRANULE, FOR SUSPENSION ORAL at 18:43

## 2020-08-25 RX ADMIN — FIDAXOMICIN 200 MILLIGRAM(S): 200 GRANULE, FOR SUSPENSION ORAL at 05:50

## 2020-08-25 RX ADMIN — Medication 10 MILLIGRAM(S): at 21:29

## 2020-08-25 RX ADMIN — Medication 40 MILLIEQUIVALENT(S): at 08:50

## 2020-08-25 NOTE — DISCHARGE NOTE PROVIDER - NSDCMRMEDTOKEN_GEN_ALL_CORE_FT
Eliquis 2.5 mg oral tablet: 1 tab(s) orally 2 times a day  furosemide 40 mg oral tablet: 1 tab(s) orally once a day  hydrALAZINE 50 mg oral tablet: 1 tab(s) orally 2 times a day  Melatonin 10 mg oral capsule: 1 cap(s) orally once a day (at bedtime)  Metoprolol Succinate  mg oral tablet, extended release: 1 tab(s) orally once a day  pantoprazole 40 mg oral delayed release tablet: 1 tab(s) orally once a day (before a meal)  potassium chloride 20 mEq oral tablet, extended release: 0.5 tab(s) orally once a day  Synthroid 75 mcg (0.075 mg) oral tablet: 1 tab(s) orally once a day Eliquis 2.5 mg oral tablet: 1 tab(s) orally 2 times a day  fidaxomicin 200 mg oral tablet: 1 tab(s) orally 2 times a day  furosemide 40 mg oral tablet: 1 tab(s) orally once a day  hydrALAZINE 50 mg oral tablet: 1 tab(s) orally 2 times a day  Melatonin 10 mg oral capsule: 1 cap(s) orally once a day (at bedtime)  Metoprolol Succinate  mg oral tablet, extended release: 1 tab(s) orally once a day  pantoprazole 40 mg oral delayed release tablet: 1 tab(s) orally once a day (before a meal)  potassium chloride 20 mEq oral tablet, extended release: 0.5 tab(s) orally once a day  Synthroid 75 mcg (0.075 mg) oral tablet: 1 tab(s) orally once a day Eliquis 2.5 mg oral tablet: 1 tab(s) orally 2 times a day  fidaxomicin 200 mg oral tablet: 1 tab(s) orally 2 times a day  furosemide 40 mg oral tablet: 1 tab(s) orally once a day  Melatonin 10 mg oral capsule: 1 cap(s) orally once a day (at bedtime)  Metoprolol Succinate  mg oral tablet, extended release: 1 tab(s) orally once a day  pantoprazole 40 mg oral delayed release tablet: 1 tab(s) orally once a day (before a meal)  potassium chloride 20 mEq oral tablet, extended release: 0.5 tab(s) orally once a day  Synthroid 75 mcg (0.075 mg) oral tablet: 1 tab(s) orally once a day

## 2020-08-25 NOTE — DISCHARGE NOTE NURSING/CASE MANAGEMENT/SOCIAL WORK - PATIENT PORTAL LINK FT
You can access the FollowMyHealth Patient Portal offered by [facility] by registering at the following website: [email or web address]. By joining [employer]’s FollowMyHealth portal, you will also be able to view your health information using other applications (apps) compatible with our system.

## 2020-08-25 NOTE — DISCHARGE NOTE PROVIDER - NSDCFUSCHEDAPPT_GEN_ALL_CORE_FT
MACARENA LANDA ; 09/22/2020 ; [facility]  MACARENA LANDA ; 09/24/2020 ; [facility]

## 2020-08-25 NOTE — DISCHARGE NOTE PROVIDER - NSDCCPCAREPLAN_GEN_ALL_CORE_FT
PRINCIPAL DISCHARGE DIAGNOSIS  Diagnosis: Clostridium difficile colitis  Assessment and Plan of Treatment: finish course of antibiotics as prescribed  follow up with infectious disease in 1-2 weeks.      SECONDARY DISCHARGE DIAGNOSES  Diagnosis: Acute hypokalemia  Assessment and Plan of Treatment: improved PRINCIPAL DISCHARGE DIAGNOSIS  Diagnosis: Clostridium difficile colitis  Assessment and Plan of Treatment: finish course of antibiotics as prescribed  follow up with infectious disease in 1-2 weeks.      SECONDARY DISCHARGE DIAGNOSES  Diagnosis: Hypertension  Assessment and Plan of Treatment: stop hydralazine for now  continue metoprolol      Diagnosis: Acute hypokalemia  Assessment and Plan of Treatment: improved

## 2020-08-25 NOTE — DISCHARGE NOTE PROVIDER - HOSPITAL COURSE
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    in Er, CT with pancolitis and leukocytosis.            Treated for C diff pancolitis with fadoximicin per ID x 10 days. BP meds held initially for low bp but restarted.  diarrhea improved. hypokalemia supplemented.  Patient received neulasta 8/13 per onc which may contribute to leukocytosis.  stable for discharge home.        dc planning 34 minutes. 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    in Er, CT with pancolitis and leukocytosis.            Treated for C diff pancolitis with fadoximicin per ID x 10 days. BP meds held initially for low bp but restarted.  diarrhea improved. hypokalemia supplemented.  Patient received neulasta 8/13 per onc which may contribute to leukocytosis.  stable for discharge home.        dc planning 34 minutes. updated daughter over the phone.    febrile overnight (rectal) but otherwise negative.    in chair, tolerating diet. soft stool x1. ros otherwise negative    alert, NAD RRR s1s2 ctab soft abdomen no LE edema nonfocal neuro. ambulatory.

## 2020-08-25 NOTE — PROGRESS NOTE ADULT - ASSESSMENT
89y  Male with h/o NHL on current chemo, dementia, ckd3, afib. He had  C diff 1 month prior treated with vancomycin. Patient now presents from home due to worsening diarrhea x 2 days. He denies fevers/chills or abd pain, tolerating diet. In the ER he is afebrile, + leukocytosis, CT with pancolitis. Started on Vancomycin PO and IV flagyl.      C diff pancolitis   Leukocytosis  NHL  immunosuppressed due to chemotherapy      - Had low grade fever overnight, will monitor   - 1st reoccurrence of C diff  - Continue Dificid 200mg PO q 12hours, with plan for 10 day course  - Blood cultures  no growth   - COVID 19 PCR negative   - CT A/P reporting pancolitis   - follow up cultures  - Trend Fever  - Trend Leukocytosis      Will Follow

## 2020-08-25 NOTE — PROGRESS NOTE ADULT - SUBJECTIVE AND OBJECTIVE BOX
[facility]  INFECTIOUS DISEASES AND INTERNAL MEDICINE at [place]  =======================================================  Noel Aceves MD  Diplomates American Board of Internal Medicine and Infectious Diseases  Tel: [phone]      Fax: [phone]  =======================================================    MACARENA LANDA [id number]    Follow up: C DIff    No more diarrhea     Fever to 100.8F overnight     No complaints       Allergies:  No Known Allergies      REVIEW OF SYSTEMS:  CONSTITUTIONAL:  No Fever or chills  HEENT:  No diplopia or blurred vision.  No earache, sore throat or runny nose.  CARDIOVASCULAR:  No pressure, squeezing, strangling, tightness, heaviness or aching about the chest, neck, axilla or epigastrium.  RESPIRATORY:  No cough, shortness of breath  GASTROINTESTINAL:  No nausea, vomiting, diarrhea.  GENITOURINARY:  No dysuria, frequency or urgency.   MUSCULOSKELETAL:  no joint aches, no muscle pain  SKIN:  No change in skin, hair or nails.  NEUROLOGIC:  No Headaches, seizures   PSYCHIATRIC:  No disorder of thought or mood.  ENDOCRINE:  No heat or cold intolerance  HEMATOLOGICAL:  No easy bruising or bleeding.       Physical Exam:  GEN: NAD, pleasant  HEENT: normocephalic and atraumatic. EOMI. PERRL.  Anicteric  NECK: Supple.   LUNGS: Clear to auscultation.  HEART: Regular rate and rhythm   ABDOMEN: Soft, nontender, and nondistended.  Positive bowel sounds.    : No CVA tenderness  EXTREMITIES: Without any edema.  MSK: No joint swelling  NEUROLOGIC: No Focal Deficits  PSYCHIATRIC: Appropriate affect .  SKIN: No Rash      Vitals:    T(F): 98.1 (25 Aug 2020 08:10), Max: 100.6 (25 Aug 2020 00:30)  HR: 65 (25 Aug 2020 08:10)  BP: 112/63 (25 Aug 2020 08:10)  RR: 18 (25 Aug 2020 08:10)  SpO2: 93% (25 Aug 2020 04:49) (93% - 96%)  temp max in last 48H T(F): , Max: 100.6 (08-25-20 @ 00:30)      Current Antibiotics:  fidaxomicin 200 milliGRAM(s) Oral two times a day    Other medications:  apixaban 2.5 milliGRAM(s) Oral every 12 hours  hydrALAZINE 50 milliGRAM(s) Oral two times a day  levothyroxine 75 MICROGram(s) Oral daily  melatonin 10 milliGRAM(s) Oral at bedtime  metoprolol succinate  milliGRAM(s) Oral daily  pantoprazole    Tablet 40 milliGRAM(s) Oral before breakfast  potassium chloride    Tablet ER 40 milliEquivalent(s) Oral every 4 hours  potassium chloride  10 mEq/100 mL IVPB 10 milliEquivalent(s) IV Intermittent every 1 hour        Labs:                        10.0   14.13 )-----------( 173      ( 25 Aug 2020 07:27 )             31.8      08-25    143  |  102  |  17.0  ----------------------------<  155<H>  2.6<LL>   |  28.0  |  1.33<H>    Ca    7.7<L>      25 Aug 2020 07:27  Phos  2.8     08-25  Mg     2.1     08-25        Culture - Blood (collected 08-21-20 @ 18:18)  Source: .Blood Blood-Peripheral    Culture - Blood (collected 08-21-20 @ 18:18)  Source: .Blood Blood-Peripheral      WBC Count: 14.13 K/uL (08-25-20 @ 07:27)  WBC Count: 21.41 K/uL (08-24-20 @ 07:44)  WBC Count: 19.77 K/uL (08-23-20 @ 06:42)  WBC Count: 13.65 K/uL (08-22-20 @ 06:41)  WBC Count: 11.64 K/uL (08-21-20 @ 17:59)    Creatinine, Serum: 1.33 mg/dL (08-25-20 @ 07:27)  Creatinine, Serum: 1.24 mg/dL (08-24-20 @ 07:44)  Creatinine, Serum: 1.94 mg/dL (08-23-20 @ 06:42)  Creatinine, Serum: 1.55 mg/dL (08-22-20 @ 06:41)  Creatinine, Serum: 1.94 mg/dL (08-21-20 @ 17:59)    COVID-19 PCR: NotDetec (08-22-20 @ 13:47)      < from: CT Abdomen and Pelvis w/ Oral Cont (08.22.20 @ 00:15) >  EXAM:  CT ABDOMEN AND PELVIS OC                          PROCEDURE DATE:  08/22/2020      INTERPRETATION:  CLINICAL INFORMATION:  abd pain  COMPARISON: 9/17/2015  PROCEDURE:  CT of the Abdomen and Pelvis was performed without intravenous contrast.  Intravenous contrast: None.  Oral contrast: positive contrast was administered.  Sagittal and coronal reformats were performed.    FINDINGS:  LIMITATIONS: None.    LOWER CHEST: Bronchiectasis with scarring at the left lung base.  ABDOMINAL WALL:Within normal limits.  VESSELS: Extensive calcific atherosclerosis of the aorta and its branches.  BOWEL: No evidence of obstruction. Normal distal esophagus, stomach and duodenum. Mural thickening of the entire colon with pericolonic fat stranding and a small fluid. Appendix not visualized, but there is no inflammation in the right lower quadrant to suggest appendicitis.    LIVER: Within normal limits.  BILE DUCTS: Normal caliber  GALLBLADDER: Within normal limits.  SPLEEN: Within normal limits.  PANCREAS: Atrophic  ADRENALS: Within normal limits.  KIDNEYS/URETERS: Lesions emanating from both kidneys, including a 2.3 cm hyperdense lesion arising from the upper pole of the right kidney. No hydronephrosis.    BLADDER: Within normal limits.  REPRODUCTIVE ORGANS: Within normal limits.    PERITONEUM: No ascites.  RETROPERITONEUM/LYMPH NODES: No lymphadenopathy.    BONES: No acute osseous pathology. 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 Mild scoliosis with thoracolumbar degeneration.    IMPRESSION:  Pancolitis. C. difficile colitis is a consideration.    Probable complex cyst at the upper pole the right kidney, for which renal ultrasound could be considered.    < end of copied text >

## 2020-08-25 NOTE — DISCHARGE NOTE PROVIDER - CARE PROVIDER_API CALL
Lorenzo Melara)  Hematology; Internal Medicine; Medical Oncology  [address] [place] [address]  Phone: [phone]  Fax: [phone]  Follow Up Time:     Ok Cid)  Infectious Disease; Internal Medicine  [address] [facility], [place] [address]  Phone: [phone]  Fax: [phone]  Follow Up Time:

## 2020-08-26 VITALS
DIASTOLIC BLOOD PRESSURE: 64 MMHG | TEMPERATURE: 98 F | HEART RATE: 80 BPM | RESPIRATION RATE: 18 BRPM | SYSTOLIC BLOOD PRESSURE: 133 MMHG | OXYGEN SATURATION: 97 %

## 2020-08-26 PROCEDURE — 84100 ASSAY OF PHOSPHORUS: CPT

## 2020-08-26 PROCEDURE — 36415 COLL VENOUS BLD VENIPUNCTURE: CPT

## 2020-08-26 PROCEDURE — 87040 BLOOD CULTURE FOR BACTERIA: CPT

## 2020-08-26 PROCEDURE — 80048 BASIC METABOLIC PNL TOTAL CA: CPT

## 2020-08-26 PROCEDURE — 83605 ASSAY OF LACTIC ACID: CPT

## 2020-08-26 PROCEDURE — 83880 ASSAY OF NATRIURETIC PEPTIDE: CPT

## 2020-08-26 PROCEDURE — 99285 EMERGENCY DEPT VISIT HI MDM: CPT

## 2020-08-26 PROCEDURE — 85027 COMPLETE CBC AUTOMATED: CPT

## 2020-08-26 PROCEDURE — 71045 X-RAY EXAM CHEST 1 VIEW: CPT

## 2020-08-26 PROCEDURE — 74018 RADEX ABDOMEN 1 VIEW: CPT

## 2020-08-26 PROCEDURE — 86769 SARS-COV-2 COVID-19 ANTIBODY: CPT

## 2020-08-26 PROCEDURE — U0003: CPT

## 2020-08-26 PROCEDURE — 83690 ASSAY OF LIPASE: CPT

## 2020-08-26 PROCEDURE — 99231 SBSQ HOSP IP/OBS SF/LOW 25: CPT

## 2020-08-26 PROCEDURE — G0378: CPT

## 2020-08-26 PROCEDURE — 93005 ELECTROCARDIOGRAM TRACING: CPT

## 2020-08-26 PROCEDURE — 99232 SBSQ HOSP IP/OBS MODERATE 35: CPT

## 2020-08-26 PROCEDURE — 87493 C DIFF AMPLIFIED PROBE: CPT

## 2020-08-26 PROCEDURE — 80053 COMPREHEN METABOLIC PANEL: CPT

## 2020-08-26 PROCEDURE — 74176 CT ABD & PELVIS W/O CONTRAST: CPT

## 2020-08-26 PROCEDURE — 84484 ASSAY OF TROPONIN QUANT: CPT

## 2020-08-26 PROCEDURE — 83735 ASSAY OF MAGNESIUM: CPT

## 2020-08-26 PROCEDURE — 84132 ASSAY OF SERUM POTASSIUM: CPT

## 2020-08-26 RX ADMIN — FIDAXOMICIN 200 MILLIGRAM(S): 200 GRANULE, FOR SUSPENSION ORAL at 05:28

## 2020-08-26 RX ADMIN — APIXABAN 2.5 MILLIGRAM(S): 2.5 TABLET, FILM COATED ORAL at 05:29

## 2020-08-26 RX ADMIN — Medication 50 MILLIGRAM(S): at 05:28

## 2020-08-26 RX ADMIN — Medication 100 MILLIGRAM(S): at 05:28

## 2020-08-26 RX ADMIN — Medication 75 MICROGRAM(S): at 05:29

## 2020-08-26 RX ADMIN — PANTOPRAZOLE SODIUM 40 MILLIGRAM(S): 20 TABLET, DELAYED RELEASE ORAL at 05:29

## 2020-08-26 NOTE — PROGRESS NOTE ADULT - SUBJECTIVE AND OBJECTIVE BOX
seen for c diff    no acute complaints  no diarrhea overnight  ros negative  tolerating diet     MEDICATIONS  (STANDING):  apixaban 2.5 milliGRAM(s) Oral every 12 hours  fidaxomicin 200 milliGRAM(s) Oral two times a day  hydrALAZINE 50 milliGRAM(s) Oral two times a day  levothyroxine 75 MICROGram(s) Oral daily  melatonin 10 milliGRAM(s) Oral at bedtime  metoprolol succinate  milliGRAM(s) Oral daily  pantoprazole    Tablet 40 milliGRAM(s) Oral before breakfast    MEDICATIONS  (PRN):  acetaminophen   Tablet .. 650 milliGRAM(s) Oral every 6 hours PRN Temp greater or equal to 38C (100.4F), Mild Pain (1 - 3)      Allergies    No Known Allergies      Vital Signs Last 24 Hrs  T(C): 36.7 (26 Aug 2020 07:10), Max: 36.9 (25 Aug 2020 18:29)  T(F): 98 (26 Aug 2020 07:10), Max: 98.5 (25 Aug 2020 18:29)  HR: 80 (26 Aug 2020 07:10) (70 - 82)  BP: 133/64 (26 Aug 2020 07:10) (122/57 - 149/64)  BP(mean): --  RR: 18 (26 Aug 2020 07:10) (18 - 18)  SpO2: 97% (26 Aug 2020 07:10) (96% - 100%)    PHYSICAL EXAM:    GENERAL: NAD ambulatory  ABDOMEN: Soft,  EXTREMITIES: no edema   NERVOUS SYSTEM:  Alert & Oriented X3, Motor Strength 5/5 B/L upper and lower extremities  PSYCH: normal mood, appropriate response.    LABS:                        10.0   14.13 )-----------( 173      ( 25 Aug 2020 07:27 )             31.8     08-25    x   |  x   |  x   ----------------------------<  x   4.5   |  x   |  x     Ca    7.7<L>      25 Aug 2020 07:27  Phos  2.8     08-25  Mg     2.1     08-25            CAPILLARY BLOOD GLUCOSE            RADIOLOGY & ADDITIONAL TESTS:

## 2020-08-26 NOTE — PROGRESS NOTE ADULT - SUBJECTIVE AND OBJECTIVE BOX
[facility]  INFECTIOUS DISEASES AND INTERNAL MEDICINE at [place]  =======================================================  Noel Aceves MD  Diplomates American Board of Internal Medicine and Infectious Diseases  Tel: [phone]      Fax: [phone]  =======================================================    MACARENA LANDA [id number]    Follow up: C DIff    No more diarrhea     afebrile    No complaints       Allergies:  No Known Allergies      REVIEW OF SYSTEMS:  CONSTITUTIONAL:  No Fever or chills  HEENT:  No diplopia or blurred vision.  No earache, sore throat or runny nose.  CARDIOVASCULAR:  No pressure, squeezing, strangling, tightness, heaviness or aching about the chest, neck, axilla or epigastrium.  RESPIRATORY:  No cough, shortness of breath  GASTROINTESTINAL:  No nausea, vomiting, diarrhea.  GENITOURINARY:  No dysuria, frequency or urgency.   MUSCULOSKELETAL:  no joint aches, no muscle pain  SKIN:  No change in skin, hair or nails.  NEUROLOGIC:  No Headaches, seizures   PSYCHIATRIC:  No disorder of thought or mood.  ENDOCRINE:  No heat or cold intolerance  HEMATOLOGICAL:  No easy bruising or bleeding.       Physical Exam:  GEN: NAD, pleasant  HEENT: normocephalic and atraumatic. EOMI. PERRL.  Anicteric  NECK: Supple.   LUNGS: Clear to auscultation.  HEART: Regular rate and rhythm   ABDOMEN: Soft, nontender, and nondistended.  Positive bowel sounds.    : No CVA tenderness  EXTREMITIES: Without any edema.  MSK: No joint swelling  NEUROLOGIC: No Focal Deficits  PSYCHIATRIC: Appropriate affect .  SKIN: No Rash      Vitals:  T(F): 98 (26 Aug 2020 07:10), Max: 98.5 (25 Aug 2020 18:29)  HR: 80 (26 Aug 2020 07:10)  BP: 133/64 (26 Aug 2020 07:10)  RR: 18 (26 Aug 2020 07:10)  SpO2: 97% (26 Aug 2020 07:10) (96% - 100%)  temp max in last 48H T(F): , Max: 100.6 (08-25-20 @ 00:30)      Current Antibiotics:  fidaxomicin 200 milliGRAM(s) Oral two times a day    Other medications:  apixaban 2.5 milliGRAM(s) Oral every 12 hours  hydrALAZINE 50 milliGRAM(s) Oral two times a day  levothyroxine 75 MICROGram(s) Oral daily  melatonin 10 milliGRAM(s) Oral at bedtime  metoprolol succinate  milliGRAM(s) Oral daily  pantoprazole    Tablet 40 milliGRAM(s) Oral before breakfast        Labs:             10.0   14.13 )-----------( 173      ( 25 Aug 2020 07:27 )             31.8      08-25    x   |  x   |  x   ----------------------------<  x   4.5   |  x   |  x     Ca    7.7<L>      25 Aug 2020 07:27  Phos  2.8     08-25  Mg     2.1     08-25    Culture - Blood (collected 08-21-20 @ 18:18)  Source: .Blood Blood-Peripheral    Culture - Blood (collected 08-21-20 @ 18:18)  Source: .Blood Blood-Peripheral    WBC Count: 14.13 K/uL (08-25-20 @ 07:27)  WBC Count: 21.41 K/uL (08-24-20 @ 07:44)  WBC Count: 19.77 K/uL (08-23-20 @ 06:42)  WBC Count: 13.65 K/uL (08-22-20 @ 06:41)  WBC Count: 11.64 K/uL (08-21-20 @ 17:59)    Creatinine, Serum: 1.33 mg/dL (08-25-20 @ 07:27)  Creatinine, Serum: 1.24 mg/dL (08-24-20 @ 07:44)  Creatinine, Serum: 1.94 mg/dL (08-23-20 @ 06:42)  Creatinine, Serum: 1.55 mg/dL (08-22-20 @ 06:41)  Creatinine, Serum: 1.94 mg/dL (08-21-20 @ 17:59)    COVID-19 PCR: NotDetec (08-22-20 @ 13:47)      < from: CT Abdomen and Pelvis w/ Oral Cont (08.22.20 @ 00:15) >  EXAM:  CT ABDOMEN AND PELVIS OC                          PROCEDURE DATE:  08/22/2020      INTERPRETATION:  CLINICAL INFORMATION:  abd pain  COMPARISON: 9/17/2015  PROCEDURE:  CT of the Abdomen and Pelvis was performed without intravenous contrast.  Intravenous contrast: None.  Oral contrast: positive contrast was administered.  Sagittal and coronal reformats were performed.    FINDINGS:  LIMITATIONS: None.    LOWER CHEST: Bronchiectasis with scarring at the left lung base.  ABDOMINAL WALL:Within normal limits.  VESSELS: Extensive calcific atherosclerosis of the aorta and its branches.  BOWEL: No evidence of obstruction. Normal distal esophagus, stomach and duodenum. Mural thickening of the entire colon with pericolonic fat stranding and a small fluid. Appendix not visualized, but there is no inflammation in the right lower quadrant to suggest appendicitis.    LIVER: Within normal limits.  BILE DUCTS: Normal caliber  GALLBLADDER: Within normal limits.  SPLEEN: Within normal limits.  PANCREAS: Atrophic  ADRENALS: Within normal limits.  KIDNEYS/URETERS: Lesions emanating from both kidneys, including a 2.3 cm hyperdense lesion arising from the upper pole of the right kidney. No hydronephrosis.    BLADDER: Within normal limits.  REPRODUCTIVE ORGANS: Within normal limits.    PERITONEUM: No ascites.  RETROPERITONEUM/LYMPH NODES: No lymphadenopathy.    BONES: No acute osseous pathology. 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 Mild scoliosis with thoracolumbar degeneration.    IMPRESSION:  Pancolitis. C. difficile colitis is a consideration.    Probable complex cyst at the upper pole the right kidney, for which renal ultrasound could be considered.    < end of copied text >

## 2020-08-26 NOTE — PROGRESS NOTE ADULT - ASSESSMENT
89 yo M /w hx NHL on current chemo, c diff 1 month prior treated with vancomycin, dementia, ckd3 presents from home due to worsening diarrhea x 2 days, ~10 episodes over 28 hr period.   denies fevers/chills or abd pain. tolerating diet. + nausea. diarrhea resolved after initial treatment but resumed after restarting chemo ~10 days prior.  in Er, CT with pancolitis and leukocytosis.      c diff pancolitis    dc flagyl/vancomycin    ID consulted and started fadoximicin     CKD3: seems at baseline    Afib: restart toprol 100mg daily and hydralazine 50mg BID     hold lasix 40mg daily given diarrhea    c/w eliquis    Leukocytosis : unclear if 2/2 colitis or med induced filgrastim or such)    will d/w oncology     check KUB    NHL on chemo: outpatient follow up    low mg/phos: replete    dc today,  daughter at bedside for pickup.

## 2020-08-27 ENCOUNTER — APPOINTMENT (OUTPATIENT)
Age: 85
End: 2020-08-27

## 2020-09-01 ENCOUNTER — APPOINTMENT (OUTPATIENT)
Dept: HEMATOLOGY ONCOLOGY | Facility: CLINIC | Age: 85
End: 2020-09-01

## 2020-09-01 ENCOUNTER — APPOINTMENT (OUTPATIENT)
Age: 85
End: 2020-09-01

## 2020-09-03 ENCOUNTER — APPOINTMENT (OUTPATIENT)
Age: 85
End: 2020-09-03

## 2020-09-04 ENCOUNTER — OUTPATIENT (OUTPATIENT)
Dept: OUTPATIENT SERVICES | Facility: HOSPITAL | Age: 85
LOS: 1 days | Discharge: HOME | End: 2020-09-04

## 2020-09-04 DIAGNOSIS — C83.30 DIFFUSE LARGE B-CELL LYMPHOMA, UNSPECIFIED SITE: ICD-10-CM

## 2020-09-14 ENCOUNTER — APPOINTMENT (OUTPATIENT)
Dept: HEMATOLOGY ONCOLOGY | Facility: CLINIC | Age: 85
End: 2020-09-14
Payer: MEDICARE

## 2020-09-14 PROCEDURE — 99441: CPT

## 2020-09-14 NOTE — ASSESSMENT
[FreeTextEntry1] : Diffuse large B-cell lymphoma, status post 1 cycle of RCEOP, now interrupted by antibiotic induced C. difficile colitis, resolving but still causing 1 episode of diarrhea per day.\par \par \par -Daughter would like to speak with  in terms of life expectancy for patient\par -Will discuss with  possible oral treatments as per patient's request\par -F/U pending discussion with \par \par Spent 5 minutes on the phone\par \par

## 2020-09-14 NOTE — REVIEW OF SYSTEMS
[Diarrhea] : diarrhea [Negative] : Heme/Lymph [Fever] : no fever [FreeTextEntry7] : Diarrhea has been improving, now once daily

## 2020-09-14 NOTE — HISTORY OF PRESENT ILLNESS
[Home] : at home, [unfilled] , at the time of the visit. [Medical Office: ([facility])___] : at the medical office located in  [Verbal consent obtained from patient] : the patient, [unfilled] [de-identified] : This 89 y/o male developed left inguinal adenopathy with cutaneous raised erythematous satellite lesions, and underwent a biopsy on 4/15 which showed diffuse large B cell NHL.he was seen by Dr. Austin from [facility] who recommended RCEOP. \par Christian was treated in 2006 for a lymphoma which arose on his back, and achieved complete remission with chemotherapy (Dr. Chanel Garcia was the oncologist - [facility] - we'll try to find the old records).Now comes to [facility] due to insurance issues.\par \par Pathology 5/22/20\par -Diffuse large B cell lymphoma with high proliferation index, germinal center b-cell-like immunophenotype\par \par PET/CT 05/8/2020\par -Groin Left Greater right, soft tissue hypermetabolism consistent with underlying lymphoma\par -Left hepatic lobe 2 discreate hypermetabolic nodular foci most likely additional neoplastic in nature\par -small right eight rib focus indeterminate for post traumatic versus neoplastic involvement; attention on follow up imaging [de-identified] : Patient s/p cycle 2 mini-RCEOP. Patient has been hospitalized twice for C.Diff complications. Patient no longer wishes to continue current treatment regimen. Patient states he was miserable during treatment. He wishes to no longer have IV treatment. As per daughter, patient is doing well off treatment.  Denies fever/chills,nausea, vomiting, constipation,  abdominal pain, bleeding, easy bruising or visual changes, chest pain,  SOB or HAMEDE,  LE edema.\par

## 2020-09-22 ENCOUNTER — APPOINTMENT (OUTPATIENT)
Age: 85
End: 2020-09-22

## 2020-09-24 ENCOUNTER — APPOINTMENT (OUTPATIENT)
Age: 85
End: 2020-09-24

## 2020-10-05 NOTE — ED ADULT NURSE NOTE - NS ED NURSE RECORD ANOTHER VITAL SIGN
PT/PTA met face to face to discuss pt's treatment plan and progress towards established goals. Pt will be seen by a physical therapist minimally every 6th visit or every 30 days.      Jennifer Garcia, PT    
Yes

## 2021-10-02 NOTE — PHYSICAL THERAPY INITIAL EVALUATION ADULT - LEVEL OF CONSCIOUSNESS, REHAB EVAL
Patient : Aspen Doherty Age: 89 year old Sex: female   MRN: [id number] Encounter Date: 10/2/2021    E13/13    History     Chief Complaint   Patient presents with   • Rectal Problem     HPI 89-year-old female with a past history of rectal prolapse recent admission for severe constipation and sacral fracture presents with a chief complaint of constipation rectal prolapse.  Patient states she was attempting to have a bowel own yesterday with straining and then noted that her rectum prolapsed.  She denies vomiting she is mildly nauseous she denies fevers chills or abdominal pain.    No Known Allergies    Current Discharge Medication List      Prior to Admission Medications    Details   docusate sodium-sennosides (SENOKOT S) 50-8.6 MG per tablet Take 2 tablets by mouth 2 times daily.  Qty: 60 tablet, Refills: 1      traMADol (ULTRAM) 50 MG tablet Take one-HALF tablet by mouth nightly as needed for Pain.  Qty: 20 tablet, Refills: 0      AMINO ACID SUPER FORMULA PO Take 5 mLs by mouth daily. Mix one spoonful with drink.      Alpha-Lipoic Acid (LIPOIC ACID PO) Take 1 tablet by mouth daily.      B Complex Vitamins (VITAMIN B COMPLEX PO) Take 1 tablet by mouth 2 times daily. Indications: TrueB - Plant Based      DISPENSE Take 1 capsule by mouth daily. Indications: Peak Thyroid Support      DISPENSE Take 1 tablet by mouth daily. Indications: Circulation & Vein Support for healthy legs      Coenzyme Q10 (CoQ10) 50 MG Cap Take 50 mg by mouth daily.       DHEA 10 MG Cap Take 10 mg by mouth daily (with breakfast).       DISPENSE Take 2 tablets by mouth daily (with breakfast). Bone Guard      Cholecalciferol (D-3-5) 5000 units Cap Take 5,000 Units by mouth 1 day a week. Vitamin D3       Magnesium Hydroxide (MILK OF MAGNESIA PO) Take by mouth daily as needed.      Misc Natural Products (HERBAL ENERGY COMPLEX PO) Take by mouth daily. Mix 1 T  with 4oz of liquid      Multiple Vitamins-Minerals (EYE VITAMINS PO) Take 1 tablet by mouth  daily. (Herbal Vitamin)             Past Medical History:   Diagnosis Date   • Hiatal hernia    • Urinary incontinence    • Urinary tract infection        Past Surgical History:   Procedure Laterality Date   • CATARACT EXTRAC W/ INTRAOCULAR LENS IMP&ANT VIT,BILATERL     • COLONOSCOPY DIAGNOSTIC  05/19/2010    Diverticulosis, hemorrhoids   • HAND FINGER SURGERY UNLISTED      Unspecified Hand       Family History   Problem Relation Age of Onset   • Arthritis Mother    • Stroke Mother    • Heart disease Mother    • Cancer Maternal Grandfather         ? cancer       Social History     Tobacco Use   • Smoking status: Never Smoker   • Smokeless tobacco: Never Used   Substance Use Topics   • Alcohol use: Yes     Alcohol/week: 0.0 standard drinks     Comment: rarely   • Drug use: No       E-cigarette/Vaping     E-Cigarette/Vaping Substances & Devices       Review of Systems   Constitutional: Negative for chills, fatigue and fever.   HENT: Negative for congestion and sore throat.    Eyes: Negative for redness.   Respiratory: Negative for cough and shortness of breath.    Cardiovascular: Negative for chest pain.   Gastrointestinal: Positive for constipation and nausea. Negative for abdominal pain, diarrhea and vomiting.   Genitourinary: Negative for decreased urine volume, difficulty urinating, dysuria, flank pain, pelvic pain and urgency.   Musculoskeletal: Negative for arthralgias.   Skin: Negative for rash.   Neurological: Negative for weakness.   Hematological: Does not bruise/bleed easily.   Psychiatric/Behavioral: The patient is not nervous/anxious.        Physical Exam     ED Triage Vitals [10/02/21 1434]   ED Triage Vitals Group      Temp 98 °F (36.7 °C)      Heart Rate 70      Resp 16      /78      SpO2 96 %      EtCO2 mmHg       Height 5' 2\" (1.575 m)      Weight 89 lb (40.4 kg)      Weight Scale Used ED Stated      BMI (Calculated) 16.28      IBW/kg (Calculated) 50.1       Physical Exam  Vitals and nursing  note reviewed.   Constitutional:       Appearance: She is well-developed.   HENT:      Head: Normocephalic.      Nose: Nose normal.      Mouth/Throat:      Mouth: Mucous membranes are moist.   Eyes:      Pupils: Pupils are equal, round, and reactive to light.   Cardiovascular:      Rate and Rhythm: Normal rate and regular rhythm.      Pulses:           Dorsalis pedis pulses are 2+ on the right side and 2+ on the left side.        Posterior tibial pulses are 2+ on the right side and 2+ on the left side.      Heart sounds: Normal heart sounds.   Pulmonary:      Effort: Pulmonary effort is normal. No respiratory distress.      Breath sounds: No wheezing or rales.   Abdominal:      General: There is distension (Slight distention of the abdomen).      Palpations: There is no mass.      Tenderness: There is no abdominal tenderness. There is no guarding or rebound.   Musculoskeletal:         General: Normal range of motion.      Cervical back: Normal range of motion.   Skin:     General: Skin is warm.      Findings: No erythema or rash.   Neurological:      Mental Status: She is alert and oriented to person, place, and time.      GCS: GCS eye subscore is 4. GCS verbal subscore is 5. GCS motor subscore is 6.      Cranial Nerves: No cranial nerve deficit.      Sensory: No sensory deficit.   Psychiatric:         Mood and Affect: Mood normal.         ED Course     Procedures    Lab Results     Results for orders placed or performed during the hospital encounter of 10/02/21   Comprehensive Metabolic Panel   Result Value Ref Range    Fasting Status      Sodium 140 135 - 145 mmol/L    Potassium 4.3 3.4 - 5.1 mmol/L    Chloride 106 98 - 107 mmol/L    Carbon Dioxide 30 21 - 32 mmol/L    Anion Gap 8 (L) 10 - 20 mmol/L    Glucose 95 70 - 99 mg/dL    BUN 23 (H) 6 - 20 mg/dL    Creatinine 0.51 0.51 - 0.95 mg/dL    Glomerular Filtration Rate 83 >=60    BUN/ Creatinine Ratio 45 (H) 7 - 25    Calcium 8.8 8.4 - 10.2 mg/dL    Bilirubin,  Total 0.5 0.2 - 1.0 mg/dL    GOT/AST 22 <=37 Units/L    GPT/ALT 34 <64 Units/L    Alkaline Phosphatase 105 45 - 117 Units/L    Albumin 3.5 (L) 3.6 - 5.1 g/dL    Protein, Total 7.1 6.4 - 8.2 g/dL    Globulin 3.6 2.0 - 4.0 g/dL    A/G Ratio 1.0 1.0 - 2.4   CBC with Automated Differential (performable only)   Result Value Ref Range    WBC 3.8 (L) 4.2 - 11.0 K/mcL    RBC 4.14 4.00 - 5.20 mil/mcL    HGB 13.0 12.0 - 15.5 g/dL    HCT 39.8 36.0 - 46.5 %    MCV 96.1 78.0 - 100.0 fl    MCH 31.4 26.0 - 34.0 pg    MCHC 32.7 32.0 - 36.5 g/dL    RDW-CV 14.3 11.0 - 15.0 %    RDW-SD 50.4 (H) 39.0 - 50.0 fL     140 - 450 K/mcL    NRBC 0 <=0 /100 WBC    Neutrophil, Percent 67 %    Lymphocytes, Percent 21 %    Mono, Percent 8 %    Eosinophils, Percent 2 %    Basophils, Percent 1 %    Immature Granulocytes 1 %    Absolute Neutrophils 2.6 1.8 - 7.7 K/mcL    Absolute Lymphocytes 0.8 (L) 1.0 - 4.0 K/mcL    Absolute Monocytes 0.3 0.3 - 0.9 K/mcL    Absolute Eosinophils  0.1 0.0 - 0.5 K/mcL    Absolute Basophils 0.0 0.0 - 0.3 K/mcL    Absolute Immmature Granulocytes 0.0 0.0 - 0.2 K/mcL   Urinalysis & Reflex Microscopy With Culture If Indicated   Result Value Ref Range    COLOR, URINALYSIS Yellow     APPEARANCE, URINALYSIS Hazy     GLUCOSE, URINALYSIS Negative Negative mg/dL    BILIRUBIN, URINALYSIS Negative Negative    KETONES, URINALYSIS Negative Negative mg/dL    SPECIFIC GRAVITY, URINALYSIS 1.010 1.005 - 1.030    OCCULT BLOOD, URINALYSIS Negative Negative    PH, URINALYSIS 8.0 (H) 5.0 - 7.0    PROTEIN, URINALYSIS Negative Negative mg/dL    UROBILINOGEN, URINALYSIS 0.2 0.2, 1.0 mg/dL    NITRITE, URINALYSIS Negative Negative    LEUKOCYTE ESTERASE, URINALYSIS Negative Negative         Radiology Results     Imaging Results    None         ED Medication Orders (From admission, onward)    Ordered Start     Status Ordering Provider    10/02/21 1508 10/02/21 1509  lactated ringers bolus 1,000 mL  ONCE         Last MAR action: New Bag  DERIC DENNEY    10/02/21 1508 10/02/21 1509  sodium biphosphate (FLEET) 7-19 GM/118ML enema 1 enema  ONCE         Last MAR action: Given DENNEY DERIC ISIDRO               [facility]  The rectal prolapse was reduced by myself without difficulty.  I considered discharging the patient after reduction of the rectal prolapse however reviewing the old notes it appears the patient struggles with constipation needed multiple reductions of her labs and vitals so given the patient is an advanced age I felt that giving her enemas here in obtaining laboratory studies is in the patient's best interest prior to discharge.  The patient and friend at the bedside are agreeable    Critical Care time spent on this patient outside of billable procedures:  None    Vitals  Vitals:    10/02/21 1434   BP: 135/78   Patient Position: Sitting   Pulse: 70   Resp: 16   Temp: 98 °F (36.7 °C)   TempSrc: Oral   SpO2: 96%   Weight: 40.4 kg (89 lb)   Height: 5' 2\" (1.575 m)       ED Course    4:19 PM  Patient Recheck: I rechecked the pt, whose sx were relieved following an enema and bowel movement.  Because she had a large bowel movement she is ambulatory with multi her laboratory studies and vitals are normal I felt she could continue the treatment for her chronic constipation as an outpatient.   We discussed the possibility of having a nurse or caretaker to check-in on the pt, and pt agrees. As the pt's condition has improved and she is no longer reporting discomfort, she is safe to return home. I recommended that the pt f/u with her PCP. I advised the pt to return to the ED for any new or worsening sx. The pt understands and agrees with the plan. All questions answered.    Pt agrees with plan of care. All questions answered.       Clinical Impression  ED Diagnoses        Final diagnoses    Rectal prolapse     Associated Orders          SERVICE TO HOME CARE     SERVICE TO GASTROENTEROLOGY     Constipation, unspecified constipation type     Associated  Orders          SERVICE TO HOME CARE     SERVICE TO GASTROENTEROLOGY           The patient was provided with a recommendation to follow up with a primary care provider and obtain reassessment of his/her blood pressure within three months.    Follow Up:  DO Gal[address] [facility] [address]  [phone]    Call in 2 days            Summary of your Discharge Medications      You have not been prescribed any medications.         Pt is discharged to home/self care in stable condition.       I have reviewed the information recorded by the scribe for accuracy and agree with its contents.    ____________________________________________________________________    Corby Peter acting as a scribe for Dr. Geovanni Teresa  Dictation # [id number]  Scribe: Corby Teresa MD  10/02/21 8553     alert

## 2021-10-06 PROBLEM — I10 ESSENTIAL HYPERTENSION: Status: ACTIVE | Noted: 2018-07-04

## 2021-12-10 ENCOUNTER — OUTPATIENT (OUTPATIENT)
Dept: OUTPATIENT SERVICES | Facility: HOSPITAL | Age: 86
LOS: 1 days | Discharge: HOME | End: 2021-12-10

## 2021-12-10 DIAGNOSIS — C83.30 DIFFUSE LARGE B-CELL LYMPHOMA, UNSPECIFIED SITE: ICD-10-CM

## 2021-12-13 ENCOUNTER — RESULT REVIEW (OUTPATIENT)
Age: 86
End: 2021-12-13

## 2021-12-13 ENCOUNTER — APPOINTMENT (OUTPATIENT)
Age: 86
End: 2021-12-13

## 2021-12-13 ENCOUNTER — APPOINTMENT (OUTPATIENT)
Dept: HEMATOLOGY ONCOLOGY | Facility: CLINIC | Age: 86
End: 2021-12-13
Payer: MEDICARE

## 2021-12-13 VITALS
DIASTOLIC BLOOD PRESSURE: 62 MMHG | SYSTOLIC BLOOD PRESSURE: 110 MMHG | HEIGHT: 63 IN | HEART RATE: 84 BPM | BODY MASS INDEX: 22.34 KG/M2 | WEIGHT: 126.05 LBS | OXYGEN SATURATION: 96 %

## 2021-12-13 DIAGNOSIS — C83.30 DIFFUSE LARGE B-CELL LYMPHOMA, UNSPECIFIED SITE: ICD-10-CM

## 2021-12-13 LAB
ALBUMIN SERPL ELPH-MCNC: 3.8 G/DL — SIGNIFICANT CHANGE UP (ref 3.3–5)
ALP SERPL-CCNC: 96 U/L — SIGNIFICANT CHANGE UP (ref 40–120)
ALT FLD-CCNC: 20 U/L — SIGNIFICANT CHANGE UP (ref 10–45)
ANION GAP SERPL CALC-SCNC: 12 MMOL/L — SIGNIFICANT CHANGE UP (ref 5–17)
AST SERPL-CCNC: 22 U/L — SIGNIFICANT CHANGE UP (ref 10–40)
BILIRUB SERPL-MCNC: 0.3 MG/DL — SIGNIFICANT CHANGE UP (ref 0.2–1.2)
BUN SERPL-MCNC: 26 MG/DL — HIGH (ref 7–23)
CALCIUM SERPL-MCNC: 9.4 MG/DL — SIGNIFICANT CHANGE UP (ref 8.4–10.5)
CHLORIDE SERPL-SCNC: 98 MMOL/L — SIGNIFICANT CHANGE UP (ref 96–108)
CO2 SERPL-SCNC: 30 MMOL/L — SIGNIFICANT CHANGE UP (ref 22–31)
CREAT SERPL-MCNC: 1.1 MG/DL — SIGNIFICANT CHANGE UP (ref 0.5–1.3)
GLUCOSE SERPL-MCNC: 160 MG/DL — HIGH (ref 70–99)
LDH SERPL L TO P-CCNC: 264 U/L — HIGH (ref 50–242)
POTASSIUM SERPL-MCNC: 3.9 MMOL/L — SIGNIFICANT CHANGE UP (ref 3.5–5.3)
POTASSIUM SERPL-SCNC: 3.9 MMOL/L — SIGNIFICANT CHANGE UP (ref 3.5–5.3)
PROT SERPL-MCNC: 6.5 G/DL — SIGNIFICANT CHANGE UP (ref 6–8.3)
SODIUM SERPL-SCNC: 140 MMOL/L — SIGNIFICANT CHANGE UP (ref 135–145)

## 2021-12-13 PROCEDURE — 99213 OFFICE O/P EST LOW 20 MIN: CPT

## 2021-12-13 NOTE — ADDENDUM
[FreeTextEntry1] : Documented by Phan Khan acting as scribe for Dr. Melara on 12/13/2021. \par \par All Medical record entries made by the Scribe were at my, Dr. Melara's, direction and personally dictated by me on 12/13/2021. I have reviewed the chart and agree that the record accurately reflects my personal performance of the history, physical exam, assessment and plan. I have also personally directed, reviewed, and agreed with the discharge instructions.

## 2021-12-13 NOTE — PHYSICAL EXAM
[Normal] : no peripheral adenopathy appreciated [de-identified] : Spry, elderly male - doing very well

## 2021-12-13 NOTE — REVIEW OF SYSTEMS
[Fatigue] : fatigue [Diarrhea] : diarrhea [Fever] : no fever [FreeTextEntry7] : Diarrhea has been improving, now twice daily

## 2021-12-13 NOTE — HISTORY OF PRESENT ILLNESS
[de-identified] : \par \par This 89 y/o male developed left inguinal adenopathy with cutaneous raised erythematous satellite lesions, and underwent a biopsy on 4/15 which showed diffuse large B cell NHL.he was seen by Dr. Austin from [facility] who recommended RCEOP. \par Christian was treated in 2006 for a lymphoma which arose on his back, and achieved complete remission with chemotherapy (Dr. Chanel Garcia was the oncologist - [facility].\par Began chemotherapy with mini-RCEOP.\par \par \par  [de-identified] : Patient returns for a follow up remains asymptomatic. No complaints.

## 2022-01-09 ENCOUNTER — EMERGENCY (EMERGENCY)
Facility: HOSPITAL | Age: 87
LOS: 1 days | Discharge: HOME | End: 2022-01-09
Attending: EMERGENCY MEDICINE | Admitting: EMERGENCY MEDICINE
Payer: MEDICARE

## 2022-01-09 VITALS
SYSTOLIC BLOOD PRESSURE: 144 MMHG | DIASTOLIC BLOOD PRESSURE: 63 MMHG | TEMPERATURE: 99 F | RESPIRATION RATE: 16 BRPM | OXYGEN SATURATION: 96 % | HEART RATE: 62 BPM

## 2022-01-09 VITALS
HEART RATE: 109 BPM | SYSTOLIC BLOOD PRESSURE: 163 MMHG | TEMPERATURE: 98 F | DIASTOLIC BLOOD PRESSURE: 73 MMHG | WEIGHT: 119.93 LBS | OXYGEN SATURATION: 97 % | HEIGHT: 63 IN | RESPIRATION RATE: 18 BRPM

## 2022-01-09 LAB
ALBUMIN SERPL ELPH-MCNC: 3.2 G/DL — LOW (ref 3.3–5)
ALP SERPL-CCNC: 85 U/L — SIGNIFICANT CHANGE UP (ref 40–120)
ALT FLD-CCNC: 30 U/L — SIGNIFICANT CHANGE UP (ref 12–78)
ANION GAP SERPL CALC-SCNC: 8 MMOL/L — SIGNIFICANT CHANGE UP (ref 5–17)
APPEARANCE UR: ABNORMAL
APTT BLD: 37.1 SEC — HIGH (ref 27.5–35.5)
AST SERPL-CCNC: 39 U/L — HIGH (ref 15–37)
BASOPHILS # BLD AUTO: 0.01 K/UL — SIGNIFICANT CHANGE UP (ref 0–0.2)
BASOPHILS NFR BLD AUTO: 0.2 % — SIGNIFICANT CHANGE UP (ref 0–2)
BILIRUB SERPL-MCNC: 0.5 MG/DL — SIGNIFICANT CHANGE UP (ref 0.2–1.2)
BILIRUB UR-MCNC: NEGATIVE — SIGNIFICANT CHANGE UP
BUN SERPL-MCNC: 29 MG/DL — HIGH (ref 7–23)
CALCIUM SERPL-MCNC: 8.8 MG/DL — SIGNIFICANT CHANGE UP (ref 8.5–10.1)
CHLORIDE SERPL-SCNC: 104 MMOL/L — SIGNIFICANT CHANGE UP (ref 96–108)
CO2 SERPL-SCNC: 29 MMOL/L — SIGNIFICANT CHANGE UP (ref 22–31)
COLOR SPEC: YELLOW — SIGNIFICANT CHANGE UP
CREAT SERPL-MCNC: 1.6 MG/DL — HIGH (ref 0.5–1.3)
DIFF PNL FLD: ABNORMAL
EOSINOPHIL # BLD AUTO: 0 K/UL — SIGNIFICANT CHANGE UP (ref 0–0.5)
EOSINOPHIL NFR BLD AUTO: 0 % — SIGNIFICANT CHANGE UP (ref 0–6)
GLUCOSE SERPL-MCNC: 147 MG/DL — HIGH (ref 70–99)
GLUCOSE UR QL: NEGATIVE — SIGNIFICANT CHANGE UP
HCT VFR BLD CALC: 42.3 % — SIGNIFICANT CHANGE UP (ref 39–50)
HGB BLD-MCNC: 13.5 G/DL — SIGNIFICANT CHANGE UP (ref 13–17)
IMM GRANULOCYTES NFR BLD AUTO: 0.5 % — SIGNIFICANT CHANGE UP (ref 0–1.5)
INR BLD: 1.42 RATIO — HIGH (ref 0.88–1.16)
KETONES UR-MCNC: NEGATIVE — SIGNIFICANT CHANGE UP
LACTATE SERPL-SCNC: 1.4 MMOL/L — SIGNIFICANT CHANGE UP (ref 0.7–2)
LACTATE SERPL-SCNC: 3.7 MMOL/L — HIGH (ref 0.7–2)
LEUKOCYTE ESTERASE UR-ACNC: ABNORMAL
LYMPHOCYTES # BLD AUTO: 1.57 K/UL — SIGNIFICANT CHANGE UP (ref 1–3.3)
LYMPHOCYTES # BLD AUTO: 24 % — SIGNIFICANT CHANGE UP (ref 13–44)
MCHC RBC-ENTMCNC: 28 PG — SIGNIFICANT CHANGE UP (ref 27–34)
MCHC RBC-ENTMCNC: 31.9 GM/DL — LOW (ref 32–36)
MCV RBC AUTO: 87.8 FL — SIGNIFICANT CHANGE UP (ref 80–100)
MONOCYTES # BLD AUTO: 0.71 K/UL — SIGNIFICANT CHANGE UP (ref 0–0.9)
MONOCYTES NFR BLD AUTO: 10.9 % — SIGNIFICANT CHANGE UP (ref 2–14)
NEUTROPHILS # BLD AUTO: 4.22 K/UL — SIGNIFICANT CHANGE UP (ref 1.8–7.4)
NEUTROPHILS NFR BLD AUTO: 64.4 % — SIGNIFICANT CHANGE UP (ref 43–77)
NITRITE UR-MCNC: NEGATIVE — SIGNIFICANT CHANGE UP
NRBC # BLD: 0 /100 WBCS — SIGNIFICANT CHANGE UP (ref 0–0)
PH UR: 6 — SIGNIFICANT CHANGE UP (ref 5–8)
PLATELET # BLD AUTO: 173 K/UL — SIGNIFICANT CHANGE UP (ref 150–400)
POTASSIUM SERPL-MCNC: 3.9 MMOL/L — SIGNIFICANT CHANGE UP (ref 3.5–5.3)
POTASSIUM SERPL-SCNC: 3.9 MMOL/L — SIGNIFICANT CHANGE UP (ref 3.5–5.3)
PROT SERPL-MCNC: 7.3 G/DL — SIGNIFICANT CHANGE UP (ref 6–8.3)
PROT UR-MCNC: 30 MG/DL
PROTHROM AB SERPL-ACNC: 16.3 SEC — HIGH (ref 10.6–13.6)
RAPID RVP RESULT: DETECTED
RBC # BLD: 4.82 M/UL — SIGNIFICANT CHANGE UP (ref 4.2–5.8)
RBC # FLD: 14.3 % — SIGNIFICANT CHANGE UP (ref 10.3–14.5)
SARS-COV-2 RNA SPEC QL NAA+PROBE: DETECTED
SODIUM SERPL-SCNC: 141 MMOL/L — SIGNIFICANT CHANGE UP (ref 135–145)
SP GR SPEC: 1.01 — SIGNIFICANT CHANGE UP (ref 1.01–1.02)
UROBILINOGEN FLD QL: NEGATIVE — SIGNIFICANT CHANGE UP
WBC # BLD: 6.54 K/UL — SIGNIFICANT CHANGE UP (ref 3.8–10.5)
WBC # FLD AUTO: 6.54 K/UL — SIGNIFICANT CHANGE UP (ref 3.8–10.5)

## 2022-01-09 PROCEDURE — 85025 COMPLETE CBC W/AUTO DIFF WBC: CPT

## 2022-01-09 PROCEDURE — 36415 COLL VENOUS BLD VENIPUNCTURE: CPT

## 2022-01-09 PROCEDURE — 71045 X-RAY EXAM CHEST 1 VIEW: CPT | Mod: 26

## 2022-01-09 PROCEDURE — 93010 ELECTROCARDIOGRAM REPORT: CPT

## 2022-01-09 PROCEDURE — 83605 ASSAY OF LACTIC ACID: CPT

## 2022-01-09 PROCEDURE — 80053 COMPREHEN METABOLIC PANEL: CPT

## 2022-01-09 PROCEDURE — 99284 EMERGENCY DEPT VISIT MOD MDM: CPT | Mod: 25

## 2022-01-09 PROCEDURE — 96360 HYDRATION IV INFUSION INIT: CPT

## 2022-01-09 PROCEDURE — 71045 X-RAY EXAM CHEST 1 VIEW: CPT

## 2022-01-09 PROCEDURE — 87086 URINE CULTURE/COLONY COUNT: CPT

## 2022-01-09 PROCEDURE — 87077 CULTURE AEROBIC IDENTIFY: CPT

## 2022-01-09 PROCEDURE — 81001 URINALYSIS AUTO W/SCOPE: CPT

## 2022-01-09 PROCEDURE — 85610 PROTHROMBIN TIME: CPT

## 2022-01-09 PROCEDURE — 93005 ELECTROCARDIOGRAM TRACING: CPT

## 2022-01-09 PROCEDURE — 0225U NFCT DS DNA&RNA 21 SARSCOV2: CPT

## 2022-01-09 PROCEDURE — 87186 SC STD MICRODIL/AGAR DIL: CPT

## 2022-01-09 PROCEDURE — 87040 BLOOD CULTURE FOR BACTERIA: CPT

## 2022-01-09 PROCEDURE — 85730 THROMBOPLASTIN TIME PARTIAL: CPT

## 2022-01-09 PROCEDURE — 99285 EMERGENCY DEPT VISIT HI MDM: CPT

## 2022-01-09 RX ORDER — SODIUM CHLORIDE 9 MG/ML
1000 INJECTION INTRAMUSCULAR; INTRAVENOUS; SUBCUTANEOUS ONCE
Refills: 0 | Status: COMPLETED | OUTPATIENT
Start: 2022-01-09 | End: 2022-01-09

## 2022-01-09 RX ORDER — SODIUM CHLORIDE 9 MG/ML
1000 INJECTION INTRAMUSCULAR; INTRAVENOUS; SUBCUTANEOUS ONCE
Refills: 0 | Status: DISCONTINUED | OUTPATIENT
Start: 2022-01-09 | End: 2022-01-12

## 2022-01-09 RX ORDER — ACETAMINOPHEN 500 MG
650 TABLET ORAL ONCE
Refills: 0 | Status: COMPLETED | OUTPATIENT
Start: 2022-01-09 | End: 2022-01-09

## 2022-01-09 RX ADMIN — Medication 650 MILLIGRAM(S): at 12:10

## 2022-01-09 RX ADMIN — SODIUM CHLORIDE 1000 MILLILITER(S): 9 INJECTION INTRAMUSCULAR; INTRAVENOUS; SUBCUTANEOUS at 12:57

## 2022-01-09 RX ADMIN — SODIUM CHLORIDE 1000 MILLILITER(S): 9 INJECTION INTRAMUSCULAR; INTRAVENOUS; SUBCUTANEOUS at 11:55

## 2022-01-09 NOTE — ED ADULT NURSE NOTE - CHIEF COMPLAINT QUOTE
Patient is a 89yo male complaining of fever shortness of breath lack of appetite. Patient had covid 14 days from today Patient also has history of Non-hodgkin's lymphoma

## 2022-01-09 NOTE — ED PROVIDER NOTE - CONSTITUTIONAL, MLM
normal... Well appearing, awake, alert, oriented to person, place, time/situation shaking mild tachypnea [place]

## 2022-01-09 NOTE — ED PROVIDER NOTE - PATIENT PORTAL LINK FT
You can access the FollowMyHealth Patient Portal offered by [facility] by registering at the following website: [email or web address]. By joining [facility]’s FollowMyHealth portal, you will also be able to view your health information using other applications (apps) compatible with our system.

## 2022-01-09 NOTE — ED PROVIDER NOTE - ATTENDING CONTRIBUTION TO CARE
89 male presents to ER c/o fever, shakiness, decreased appetite, body aches, patient tested postive for covid 2 weeks ago, patient states he otherwise feels well, denies chest pain, no shortness of breath, patient alert, no acute distress, heart and lungs clear, abdomen soft, no signs of celluliutis, f/u labs, iv fluids, tylnenol, covid swab, re-eval.

## 2022-01-09 NOTE — ED ADULT NURSE NOTE - OBJECTIVE STATEMENT
Patient is a 89yo male complaining of fever shortness of breath lack of appetite. Patient had covid 14 days from today Patient also has history of Non-hodgkin's lymphoma, rectal temp 102 on arrival, skin intact, safety maintained, will continue to monitor

## 2022-01-09 NOTE — ED ADULT TRIAGE NOTE - CHIEF COMPLAINT QUOTE
Patient is a 89yo male complaining of fever shortness of breath lack of appetite. Patient had covid 14 days from today Patient also has history of Non-hodgkin's lymphoma

## 2022-01-09 NOTE — ED PROVIDER NOTE - OBJECTIVE STATEMENT
Pt is a 89 yo male with pmhx of NHL on chemo on hold, c diff treated with vancomycin, dementia, ckd3, gerd htn hypothyroidism sepsis s/p cholecystectomy BIB daughter for fever. Daughter states last few days loss of appetite shaking and today noted fever 100.9. Pt chemo currently on hold due to recent sepsis c diff and covid 2 weeks ago daughter states did not have fever with covid and was feeling better and symptoms started a few days ago. Pt also having dry cough itchy throat and some sob.  history obtained from daughter pt denies any complaints     pcp Cheyenne Basurto   oncologist Dr. Lorenzo Melara   cardio Darius Pringle

## 2022-01-09 NOTE — ED PROVIDER NOTE - PROGRESS NOTE DETAILS
+ covid repeat lactate wnl pt wants to home no hypoxia spoke with daughter advised fluids tylenol not candidate for MAB

## 2022-01-12 LAB
-  AMIKACIN: SIGNIFICANT CHANGE UP
-  AMIKACIN: SIGNIFICANT CHANGE UP
-  AMOXICILLIN/CLAVULANIC ACID: SIGNIFICANT CHANGE UP
-  AMOXICILLIN/CLAVULANIC ACID: SIGNIFICANT CHANGE UP
-  AMPICILLIN/SULBACTAM: SIGNIFICANT CHANGE UP
-  AMPICILLIN/SULBACTAM: SIGNIFICANT CHANGE UP
-  AMPICILLIN: SIGNIFICANT CHANGE UP
-  AMPICILLIN: SIGNIFICANT CHANGE UP
-  AZTREONAM: SIGNIFICANT CHANGE UP
-  AZTREONAM: SIGNIFICANT CHANGE UP
-  CEFAZOLIN: SIGNIFICANT CHANGE UP
-  CEFAZOLIN: SIGNIFICANT CHANGE UP
-  CEFEPIME: SIGNIFICANT CHANGE UP
-  CEFEPIME: SIGNIFICANT CHANGE UP
-  CEFOXITIN: SIGNIFICANT CHANGE UP
-  CEFOXITIN: SIGNIFICANT CHANGE UP
-  CEFTRIAXONE: SIGNIFICANT CHANGE UP
-  CEFTRIAXONE: SIGNIFICANT CHANGE UP
-  CIPROFLOXACIN: SIGNIFICANT CHANGE UP
-  CIPROFLOXACIN: SIGNIFICANT CHANGE UP
-  ERTAPENEM: SIGNIFICANT CHANGE UP
-  ERTAPENEM: SIGNIFICANT CHANGE UP
-  GENTAMICIN: SIGNIFICANT CHANGE UP
-  GENTAMICIN: SIGNIFICANT CHANGE UP
-  IMIPENEM: SIGNIFICANT CHANGE UP
-  IMIPENEM: SIGNIFICANT CHANGE UP
-  LEVOFLOXACIN: SIGNIFICANT CHANGE UP
-  LEVOFLOXACIN: SIGNIFICANT CHANGE UP
-  MEROPENEM: SIGNIFICANT CHANGE UP
-  MEROPENEM: SIGNIFICANT CHANGE UP
-  NITROFURANTOIN: SIGNIFICANT CHANGE UP
-  NITROFURANTOIN: SIGNIFICANT CHANGE UP
-  PIPERACILLIN/TAZOBACTAM: SIGNIFICANT CHANGE UP
-  PIPERACILLIN/TAZOBACTAM: SIGNIFICANT CHANGE UP
-  TIGECYCLINE: SIGNIFICANT CHANGE UP
-  TIGECYCLINE: SIGNIFICANT CHANGE UP
-  TOBRAMYCIN: SIGNIFICANT CHANGE UP
-  TOBRAMYCIN: SIGNIFICANT CHANGE UP
-  TRIMETHOPRIM/SULFAMETHOXAZOLE: SIGNIFICANT CHANGE UP
-  TRIMETHOPRIM/SULFAMETHOXAZOLE: SIGNIFICANT CHANGE UP
CULTURE RESULTS: SIGNIFICANT CHANGE UP
METHOD TYPE: SIGNIFICANT CHANGE UP
METHOD TYPE: SIGNIFICANT CHANGE UP
ORGANISM # SPEC MICROSCOPIC CNT: SIGNIFICANT CHANGE UP
SPECIMEN SOURCE: SIGNIFICANT CHANGE UP

## 2022-01-14 RX ORDER — AZTREONAM 2 G
1 VIAL (EA) INJECTION
Qty: 14 | Refills: 0
Start: 2022-01-14 | End: 2022-01-20

## 2022-01-19 NOTE — DISCHARGE NOTE NURSING/CASE MANAGEMENT/SOCIAL WORK - NSTRANSFERBELONGINGSDISPO_GEN_A_NUR
[FreeTextEntry1] : Anxiety, depression: Better controlled with his better control of his dependence. [facility] who will continue sublocade. He will return to work after he is reassessed by [facility] in 2 weeks. \par 
with patient

## 2023-03-23 NOTE — ED PROVIDER NOTE - PHYSICAL EXAMINATION
on unit
General:     NAD, well-nourished, well-appearing  Head:     NC/AT, EOMI, oral mucosa moist  Neck:     trachea midline  Lungs:     CTA b/l, no w/r/r  CVS:     S1S2, RRR, no m/g/r  Abd:     +BS, TTP @ LLQ, no rebound or guarding, s/nd, no organomegaly  Ext:    2+ radial and pedal pulses, no c/c/e  Neuro: AAOx3, no sensory/motor deficits

## 2023-04-20 NOTE — ED CDU PROVIDER INITIAL DAY NOTE - OBSERVATION MONITORING PLAN
ED Record Reviewed Lab Results   Component Value Date    EGFR 50 04/20/2023    EGFR 43 04/19/2023    EGFR 45 04/18/2023    CREATININE 1 19 04/20/2023    CREATININE 1 36 (H) 04/19/2023    CREATININE 1 31 (H) 04/18/2023   · Creatinine at baseline    Continue routine lab monitoring

## 2025-03-17 NOTE — PATIENT PROFILE ADULT - TYPE OF EQUIPMENT CURRENTLY USED AT HOME
Anesthesia Post Evaluation    Patient: Danilo Krause    Procedure(s) Performed: Procedure(s) (LRB):  Cardioversion or Defibrillation (N/A)  Transesophageal echo (JOSEF) intra-procedure log documentation (N/A)    Final Anesthesia Type: general      Patient location during evaluation: PACU  Patient participation: Yes- Able to Participate  Level of consciousness: awake and alert  Post-procedure vital signs: reviewed and stable  Pain management: adequate  Airway patency: patent    PONV status at discharge: No PONV  Anesthetic complications: no      Cardiovascular status: hemodynamically stable  Respiratory status: spontaneous ventilation  Follow-up not needed.              Vitals Value Taken Time   /91 03/15/25 11:22   Temp 36.6 °C (97.9 °F) 03/15/25 11:22   Pulse 85 03/15/25 11:22   Resp 18 03/15/25 11:22   SpO2 97 % 03/15/25 11:22         No case tracking events are documented in the log.      Pain/Zulma Score: No data recorded         cane, straight

## 2025-07-02 NOTE — ED ADULT NURSE REASSESSMENT NOTE - BREATHING
Endoscopy Scheduling Questionnaire    Are you taking any blood thinners? no      2.  Are you taking a GLP-1 Agonist? yes     If yes, name of GLP- OZEMPIC     Instructed pt to hold this medication 7 days prior to procedure. Last dose will be 8/8/2025    3.  Are you taking Adipex? no      4.  Are you on dialysis or have Kidney Disease? no    5.  Have you been diagnosed with Diverticulitis in the past three months? no    6.  Are you a diabetic? yes    7. Implantable Devices? no      8.  Do you have a history of constipation? no    9.  Previous Colonoscopy? no     If yes, did you have polyps? no     What prep did you use? N/A     How did it work? N/A    10.  Family history of colon cancer? no      Prep instructions sent to patient via My Chart    Prep - SUPREP    Prep RX sent to [facility] [place]        Procedure scheduled with Dr. Fletcher  on  8/18/2025                    
dyspnea upon exertion